# Patient Record
Sex: MALE | Race: WHITE | Employment: OTHER | URBAN - METROPOLITAN AREA
[De-identification: names, ages, dates, MRNs, and addresses within clinical notes are randomized per-mention and may not be internally consistent; named-entity substitution may affect disease eponyms.]

---

## 2018-01-09 PROBLEM — M62.82 RHABDOMYOLYSIS: Status: ACTIVE | Noted: 2018-01-09

## 2018-01-11 PROBLEM — F05 DELIRIUM DUE TO ANOTHER MEDICAL CONDITION: Status: ACTIVE | Noted: 2018-01-11

## 2018-07-03 ENCOUNTER — HOSPITAL ENCOUNTER (INPATIENT)
Age: 68
LOS: 3 days | Discharge: HOME | DRG: 683 | End: 2018-07-06
Attending: GENERAL PRACTICE | Admitting: GENERAL PRACTICE
Payer: MEDICARE

## 2018-07-03 ENCOUNTER — APPOINTMENT (OUTPATIENT)
Dept: ULTRASOUND IMAGING | Age: 68
DRG: 683 | End: 2018-07-03
Attending: GENERAL PRACTICE
Payer: MEDICARE

## 2018-07-03 ENCOUNTER — HOSPITAL ENCOUNTER (EMERGENCY)
Age: 68
Discharge: ANOTHER HOSPITAL | End: 2018-07-03
Attending: EMERGENCY MEDICINE
Payer: MEDICARE

## 2018-07-03 ENCOUNTER — HOSPITAL ENCOUNTER (OUTPATIENT)
Age: 68
Discharge: HOME | End: 2018-07-03
Payer: MEDICARE

## 2018-07-03 VITALS
SYSTOLIC BLOOD PRESSURE: 112 MMHG | HEIGHT: 67 IN | RESPIRATION RATE: 16 BRPM | OXYGEN SATURATION: 95 % | WEIGHT: 160 LBS | DIASTOLIC BLOOD PRESSURE: 68 MMHG | BODY MASS INDEX: 25.11 KG/M2 | TEMPERATURE: 98 F | HEART RATE: 74 BPM

## 2018-07-03 DIAGNOSIS — E86.0 DEHYDRATION: Primary | ICD-10-CM

## 2018-07-03 DIAGNOSIS — N17.9 ACUTE RENAL INJURY (HCC): ICD-10-CM

## 2018-07-03 LAB
ALBUMIN SERPL-MCNC: 4.1 G/DL (ref 3.5–5.2)
ALP BLD-CCNC: 52 U/L (ref 40–129)
ALT SERPL-CCNC: 10 U/L (ref 0–40)
ANION GAP SERPL CALCULATED.3IONS-SCNC: 14 MMOL/L (ref 7–16)
ANION GAP SERPL CALCULATED.3IONS-SCNC: 19 MMOL/L (ref 7–16)
AST SERPL-CCNC: 12 U/L (ref 0–39)
BASOPHILS ABSOLUTE: 0.04 E9/L (ref 0–0.2)
BASOPHILS RELATIVE PERCENT: 0.5 % (ref 0–2)
BILIRUB SERPL-MCNC: 0.4 MG/DL (ref 0–1.2)
BILIRUBIN URINE: NEGATIVE
BLOOD, URINE: NEGATIVE
BUN BLDV-MCNC: 40 MG/DL (ref 8–23)
BUN BLDV-MCNC: 45 MG/DL (ref 8–23)
CALCIUM SERPL-MCNC: 9.3 MG/DL (ref 8.6–10.2)
CALCIUM SERPL-MCNC: 9.8 MG/DL (ref 8.6–10.2)
CHLORIDE BLD-SCNC: 100 MMOL/L (ref 98–107)
CHLORIDE BLD-SCNC: 94 MMOL/L (ref 98–107)
CLARITY: CLEAR
CO2: 22 MMOL/L (ref 22–29)
CO2: 25 MMOL/L (ref 22–29)
COLOR: YELLOW
CREAT SERPL-MCNC: 2.8 MG/DL (ref 0.7–1.2)
CREAT SERPL-MCNC: 4 MG/DL (ref 0.7–1.2)
EOSINOPHILS ABSOLUTE: 0.46 E9/L (ref 0.05–0.5)
EOSINOPHILS RELATIVE PERCENT: 5.9 % (ref 0–6)
GFR AFRICAN AMERICAN: 18
GFR AFRICAN AMERICAN: 28
GFR NON-AFRICAN AMERICAN: 15 ML/MIN/1.73
GFR NON-AFRICAN AMERICAN: 23 ML/MIN/1.73
GLUCOSE BLD-MCNC: 170 MG/DL (ref 74–109)
GLUCOSE BLD-MCNC: 216 MG/DL (ref 74–109)
GLUCOSE URINE: 250 MG/DL
HCT VFR BLD CALC: 40.7 % (ref 37–54)
HEMOGLOBIN: 13.7 G/DL (ref 12.5–16.5)
IMMATURE GRANULOCYTES #: 0.02 E9/L
IMMATURE GRANULOCYTES %: 0.3 % (ref 0–5)
KETONES, URINE: NEGATIVE MG/DL
LACTIC ACID: 0.9 MMOL/L (ref 0.5–2.2)
LEUKOCYTE ESTERASE, URINE: NEGATIVE
LYMPHOCYTES ABSOLUTE: 1.86 E9/L (ref 1.5–4)
LYMPHOCYTES RELATIVE PERCENT: 23.9 % (ref 20–42)
MAGNESIUM: 2.4 MG/DL (ref 1.6–2.6)
MCH RBC QN AUTO: 28.1 PG (ref 26–35)
MCHC RBC AUTO-ENTMCNC: 33.7 % (ref 32–34.5)
MCV RBC AUTO: 83.6 FL (ref 80–99.9)
METER GLUCOSE: 151 MG/DL (ref 70–110)
METER GLUCOSE: 175 MG/DL (ref 70–110)
METER GLUCOSE: 181 MG/DL (ref 70–110)
METER GLUCOSE: 211 MG/DL (ref 70–110)
MONOCYTES ABSOLUTE: 0.81 E9/L (ref 0.1–0.95)
MONOCYTES RELATIVE PERCENT: 10.4 % (ref 2–12)
NEUTROPHILS ABSOLUTE: 4.6 E9/L (ref 1.8–7.3)
NEUTROPHILS RELATIVE PERCENT: 59 % (ref 43–80)
NITRITE, URINE: NEGATIVE
PDW BLD-RTO: 14.8 FL (ref 11.5–15)
PH UA: 5.5 (ref 5–9)
PLATELET # BLD: 258 E9/L (ref 130–450)
PMV BLD AUTO: 9.9 FL (ref 7–12)
POTASSIUM SERPL-SCNC: 4.2 MMOL/L (ref 3.5–5)
POTASSIUM SERPL-SCNC: 4.7 MMOL/L (ref 3.5–5)
PROTEIN UA: NEGATIVE MG/DL
RBC # BLD: 4.87 E12/L (ref 3.8–5.8)
SODIUM BLD-SCNC: 136 MMOL/L (ref 132–146)
SODIUM BLD-SCNC: 138 MMOL/L (ref 132–146)
SPECIFIC GRAVITY UA: 1.01 (ref 1–1.03)
TOTAL PROTEIN: 7.5 G/DL (ref 6.4–8.3)
UROBILINOGEN, URINE: 0.2 E.U./DL
WBC # BLD: 7.8 E9/L (ref 4.5–11.5)

## 2018-07-03 PROCEDURE — 85025 COMPLETE CBC W/AUTO DIFF WBC: CPT

## 2018-07-03 PROCEDURE — 96361 HYDRATE IV INFUSION ADD-ON: CPT

## 2018-07-03 PROCEDURE — 82962 GLUCOSE BLOOD TEST: CPT

## 2018-07-03 PROCEDURE — A0425 GROUND MILEAGE: HCPCS

## 2018-07-03 PROCEDURE — 36415 COLL VENOUS BLD VENIPUNCTURE: CPT

## 2018-07-03 PROCEDURE — 80053 COMPREHEN METABOLIC PANEL: CPT

## 2018-07-03 PROCEDURE — 1200000000 HC SEMI PRIVATE

## 2018-07-03 PROCEDURE — 80048 BASIC METABOLIC PNL TOTAL CA: CPT

## 2018-07-03 PROCEDURE — 81003 URINALYSIS AUTO W/O SCOPE: CPT

## 2018-07-03 PROCEDURE — 2580000003 HC RX 258: Performed by: EMERGENCY MEDICINE

## 2018-07-03 PROCEDURE — A0428 BLS: HCPCS

## 2018-07-03 PROCEDURE — 76770 US EXAM ABDO BACK WALL COMP: CPT

## 2018-07-03 PROCEDURE — 96360 HYDRATION IV INFUSION INIT: CPT

## 2018-07-03 PROCEDURE — 83735 ASSAY OF MAGNESIUM: CPT

## 2018-07-03 PROCEDURE — 2580000003 HC RX 258: Performed by: GENERAL PRACTICE

## 2018-07-03 PROCEDURE — 6360000002 HC RX W HCPCS: Performed by: GENERAL PRACTICE

## 2018-07-03 PROCEDURE — 6370000000 HC RX 637 (ALT 250 FOR IP): Performed by: GENERAL PRACTICE

## 2018-07-03 PROCEDURE — 99285 EMERGENCY DEPT VISIT HI MDM: CPT

## 2018-07-03 PROCEDURE — 83605 ASSAY OF LACTIC ACID: CPT

## 2018-07-03 RX ORDER — 0.9 % SODIUM CHLORIDE 0.9 %
1000 INTRAVENOUS SOLUTION INTRAVENOUS ONCE
Status: COMPLETED | OUTPATIENT
Start: 2018-07-03 | End: 2018-07-03

## 2018-07-03 RX ORDER — HYDROXYZINE PAMOATE 25 MG/1
25 CAPSULE ORAL NIGHTLY
Status: DISCONTINUED | OUTPATIENT
Start: 2018-07-03 | End: 2018-07-06 | Stop reason: HOSPADM

## 2018-07-03 RX ORDER — SODIUM CHLORIDE 450 MG/100ML
INJECTION, SOLUTION INTRAVENOUS CONTINUOUS
Status: DISCONTINUED | OUTPATIENT
Start: 2018-07-03 | End: 2018-07-06 | Stop reason: HOSPADM

## 2018-07-03 RX ORDER — FENOFIBRATE 160 MG/1
160 TABLET ORAL DAILY
Status: DISCONTINUED | OUTPATIENT
Start: 2018-07-03 | End: 2018-07-06 | Stop reason: HOSPADM

## 2018-07-03 RX ORDER — NICOTINE POLACRILEX 4 MG
15 LOZENGE BUCCAL PRN
Status: DISCONTINUED | OUTPATIENT
Start: 2018-07-03 | End: 2018-07-06 | Stop reason: HOSPADM

## 2018-07-03 RX ORDER — AMOXICILLIN 500 MG/1
500 CAPSULE ORAL 3 TIMES DAILY
COMMUNITY
End: 2019-09-07

## 2018-07-03 RX ORDER — ROSUVASTATIN CALCIUM 10 MG/1
40 TABLET, COATED ORAL EVERY EVENING
Status: DISCONTINUED | OUTPATIENT
Start: 2018-07-03 | End: 2018-07-06 | Stop reason: HOSPADM

## 2018-07-03 RX ORDER — FAMOTIDINE 20 MG/1
20 TABLET, FILM COATED ORAL 2 TIMES DAILY
Status: DISCONTINUED | OUTPATIENT
Start: 2018-07-03 | End: 2018-07-06 | Stop reason: HOSPADM

## 2018-07-03 RX ORDER — CHOLECALCIFEROL (VITAMIN D3) 50 MCG
2000 TABLET ORAL DAILY
Status: DISCONTINUED | OUTPATIENT
Start: 2018-07-03 | End: 2018-07-06 | Stop reason: HOSPADM

## 2018-07-03 RX ORDER — DEXTROSE MONOHYDRATE 50 MG/ML
100 INJECTION, SOLUTION INTRAVENOUS PRN
Status: DISCONTINUED | OUTPATIENT
Start: 2018-07-03 | End: 2018-07-06 | Stop reason: HOSPADM

## 2018-07-03 RX ORDER — VILAZODONE HYDROCHLORIDE 10 MG/1
20 TABLET ORAL DAILY
Status: DISCONTINUED | OUTPATIENT
Start: 2018-07-03 | End: 2018-07-06 | Stop reason: HOSPADM

## 2018-07-03 RX ORDER — AMOXICILLIN 250 MG/1
500 CAPSULE ORAL 3 TIMES DAILY
Status: DISCONTINUED | OUTPATIENT
Start: 2018-07-03 | End: 2018-07-06 | Stop reason: HOSPADM

## 2018-07-03 RX ORDER — ACETAMINOPHEN 500 MG
500 TABLET ORAL EVERY 4 HOURS PRN
Status: DISCONTINUED | OUTPATIENT
Start: 2018-07-03 | End: 2018-07-06 | Stop reason: HOSPADM

## 2018-07-03 RX ORDER — DEXTROSE MONOHYDRATE 25 G/50ML
12.5 INJECTION, SOLUTION INTRAVENOUS PRN
Status: DISCONTINUED | OUTPATIENT
Start: 2018-07-03 | End: 2018-07-06 | Stop reason: HOSPADM

## 2018-07-03 RX ORDER — GLIPIZIDE 5 MG/1
10 TABLET ORAL
Status: DISCONTINUED | OUTPATIENT
Start: 2018-07-03 | End: 2018-07-06 | Stop reason: HOSPADM

## 2018-07-03 RX ORDER — HYDROXYZINE HYDROCHLORIDE 25 MG/1
25 TABLET, FILM COATED ORAL NIGHTLY
COMMUNITY

## 2018-07-03 RX ORDER — CLONIDINE HYDROCHLORIDE 0.1 MG/1
0.1 TABLET ORAL 2 TIMES DAILY
Status: DISCONTINUED | OUTPATIENT
Start: 2018-07-03 | End: 2018-07-06

## 2018-07-03 RX ORDER — HYDROXYZINE HYDROCHLORIDE 10 MG/1
25 TABLET, FILM COATED ORAL NIGHTLY
Status: DISCONTINUED | OUTPATIENT
Start: 2018-07-03 | End: 2018-07-03 | Stop reason: CLARIF

## 2018-07-03 RX ORDER — VILAZODONE HYDROCHLORIDE 20 MG/1
20 TABLET ORAL DAILY
COMMUNITY
End: 2019-09-07

## 2018-07-03 RX ORDER — CHOLECALCIFEROL (VITAMIN D3) 125 MCG
2000 TABLET ORAL
COMMUNITY

## 2018-07-03 RX ADMIN — ROSUVASTATIN CALCIUM 40 MG: 10 TABLET, FILM COATED ORAL at 20:07

## 2018-07-03 RX ADMIN — GLIPIZIDE 10 MG: 5 TABLET ORAL at 20:06

## 2018-07-03 RX ADMIN — Medication 400 MG: at 21:00

## 2018-07-03 RX ADMIN — CLONIDINE HYDROCHLORIDE 0.1 MG: 0.1 TABLET ORAL at 21:01

## 2018-07-03 RX ADMIN — AMOXICILLIN 500 MG: 250 CAPSULE ORAL at 21:01

## 2018-07-03 RX ADMIN — SODIUM CHLORIDE 1000 ML: 9 INJECTION, SOLUTION INTRAVENOUS at 11:22

## 2018-07-03 RX ADMIN — SODIUM CHLORIDE: 4.5 INJECTION, SOLUTION INTRAVENOUS at 18:14

## 2018-07-03 RX ADMIN — ACETAMINOPHEN 500 MG: 500 TABLET ORAL at 21:00

## 2018-07-03 RX ADMIN — INSULIN LISPRO 1 UNITS: 100 INJECTION, SOLUTION INTRAVENOUS; SUBCUTANEOUS at 21:13

## 2018-07-03 RX ADMIN — Medication 2000 UNITS: at 20:06

## 2018-07-03 RX ADMIN — FAMOTIDINE 20 MG: 20 TABLET ORAL at 21:00

## 2018-07-03 RX ADMIN — ENOXAPARIN SODIUM 30 MG: 100 INJECTION SUBCUTANEOUS at 20:07

## 2018-07-03 RX ADMIN — HYDROXYZINE PAMOATE 25 MG: 25 CAPSULE ORAL at 21:02

## 2018-07-03 ASSESSMENT — PAIN DESCRIPTION - ONSET: ONSET: ON-GOING

## 2018-07-03 ASSESSMENT — PAIN SCALES - GENERAL
PAINLEVEL_OUTOF10: 5
PAINLEVEL_OUTOF10: 0
PAINLEVEL_OUTOF10: 0

## 2018-07-03 ASSESSMENT — PAIN DESCRIPTION - LOCATION: LOCATION: HEAD

## 2018-07-03 ASSESSMENT — PAIN DESCRIPTION - ORIENTATION: ORIENTATION: MID

## 2018-07-03 ASSESSMENT — PAIN DESCRIPTION - PAIN TYPE: TYPE: ACUTE PAIN

## 2018-07-03 ASSESSMENT — PAIN DESCRIPTION - FREQUENCY: FREQUENCY: INTERMITTENT

## 2018-07-03 ASSESSMENT — PAIN DESCRIPTION - DESCRIPTORS: DESCRIPTORS: HEADACHE

## 2018-07-03 ASSESSMENT — PAIN DESCRIPTION - PROGRESSION: CLINICAL_PROGRESSION: NOT CHANGED

## 2018-07-03 NOTE — PROGRESS NOTES
Spoke with Dr. Rashmi Pearl regarding nephrology consult. See new orders.   Electronically signed by Sandhya Thakur RN on 7/3/2018 at 6:12 PM

## 2018-07-04 LAB
ALBUMIN SERPL-MCNC: 3.5 G/DL (ref 3.5–5.2)
ALP BLD-CCNC: 52 U/L (ref 40–129)
ALT SERPL-CCNC: 8 U/L (ref 0–40)
ANION GAP SERPL CALCULATED.3IONS-SCNC: 13 MMOL/L (ref 7–16)
AST SERPL-CCNC: 12 U/L (ref 0–39)
BILIRUB SERPL-MCNC: 0.2 MG/DL (ref 0–1.2)
BUN BLDV-MCNC: 35 MG/DL (ref 8–23)
CALCIUM SERPL-MCNC: 9 MG/DL (ref 8.6–10.2)
CHLORIDE BLD-SCNC: 102 MMOL/L (ref 98–107)
CO2: 22 MMOL/L (ref 22–29)
CREAT SERPL-MCNC: 2.3 MG/DL (ref 0.7–1.2)
GFR AFRICAN AMERICAN: 35
GFR NON-AFRICAN AMERICAN: 29 ML/MIN/1.73
GLUCOSE BLD-MCNC: 189 MG/DL (ref 74–109)
HCT VFR BLD CALC: 37.3 % (ref 37–54)
HEMOGLOBIN: 12.4 G/DL (ref 12.5–16.5)
MCH RBC QN AUTO: 28.4 PG (ref 26–35)
MCHC RBC AUTO-ENTMCNC: 33.2 % (ref 32–34.5)
MCV RBC AUTO: 85.4 FL (ref 80–99.9)
METER GLUCOSE: 122 MG/DL (ref 70–110)
METER GLUCOSE: 133 MG/DL (ref 70–110)
METER GLUCOSE: 144 MG/DL (ref 70–110)
METER GLUCOSE: 171 MG/DL (ref 70–110)
METER GLUCOSE: 175 MG/DL (ref 70–110)
PDW BLD-RTO: 14.7 FL (ref 11.5–15)
PLATELET # BLD: 237 E9/L (ref 130–450)
PMV BLD AUTO: 10.3 FL (ref 7–12)
POTASSIUM SERPL-SCNC: 4.4 MMOL/L (ref 3.5–5)
PROSTATE SPECIFIC ANTIGEN: 0.56 NG/ML (ref 0–4)
RBC # BLD: 4.37 E12/L (ref 3.8–5.8)
SODIUM BLD-SCNC: 137 MMOL/L (ref 132–146)
TOTAL PROTEIN: 6.4 G/DL (ref 6.4–8.3)
WBC # BLD: 6.6 E9/L (ref 4.5–11.5)

## 2018-07-04 PROCEDURE — 1200000000 HC SEMI PRIVATE

## 2018-07-04 PROCEDURE — 85027 COMPLETE CBC AUTOMATED: CPT

## 2018-07-04 PROCEDURE — G0103 PSA SCREENING: HCPCS

## 2018-07-04 PROCEDURE — 2580000003 HC RX 258: Performed by: GENERAL PRACTICE

## 2018-07-04 PROCEDURE — 36415 COLL VENOUS BLD VENIPUNCTURE: CPT

## 2018-07-04 PROCEDURE — 6360000002 HC RX W HCPCS: Performed by: GENERAL PRACTICE

## 2018-07-04 PROCEDURE — 82962 GLUCOSE BLOOD TEST: CPT

## 2018-07-04 PROCEDURE — 6370000000 HC RX 637 (ALT 250 FOR IP): Performed by: GENERAL PRACTICE

## 2018-07-04 PROCEDURE — 80053 COMPREHEN METABOLIC PANEL: CPT

## 2018-07-04 RX ADMIN — INSULIN LISPRO 1 UNITS: 100 INJECTION, SOLUTION INTRAVENOUS; SUBCUTANEOUS at 12:22

## 2018-07-04 RX ADMIN — AMOXICILLIN 500 MG: 250 CAPSULE ORAL at 22:04

## 2018-07-04 RX ADMIN — FAMOTIDINE 20 MG: 20 TABLET ORAL at 08:28

## 2018-07-04 RX ADMIN — CLONIDINE HYDROCHLORIDE 0.1 MG: 0.1 TABLET ORAL at 22:04

## 2018-07-04 RX ADMIN — ENOXAPARIN SODIUM 30 MG: 100 INJECTION SUBCUTANEOUS at 08:33

## 2018-07-04 RX ADMIN — SODIUM CHLORIDE: 4.5 INJECTION, SOLUTION INTRAVENOUS at 13:01

## 2018-07-04 RX ADMIN — ROSUVASTATIN CALCIUM 40 MG: 10 TABLET, FILM COATED ORAL at 16:56

## 2018-07-04 RX ADMIN — CLONIDINE HYDROCHLORIDE 0.1 MG: 0.1 TABLET ORAL at 08:28

## 2018-07-04 RX ADMIN — HYDROXYZINE PAMOATE 25 MG: 25 CAPSULE ORAL at 22:05

## 2018-07-04 RX ADMIN — INSULIN LISPRO 1 UNITS: 100 INJECTION, SOLUTION INTRAVENOUS; SUBCUTANEOUS at 16:53

## 2018-07-04 RX ADMIN — INSULIN LISPRO 1 UNITS: 100 INJECTION, SOLUTION INTRAVENOUS; SUBCUTANEOUS at 08:33

## 2018-07-04 RX ADMIN — VILAZODONE HYDROCHLORIDE 20 MG: 10 TABLET ORAL at 08:28

## 2018-07-04 RX ADMIN — GLIPIZIDE 10 MG: 5 TABLET ORAL at 06:35

## 2018-07-04 RX ADMIN — Medication 400 MG: at 08:33

## 2018-07-04 RX ADMIN — Medication 400 MG: at 22:04

## 2018-07-04 RX ADMIN — AMOXICILLIN 500 MG: 250 CAPSULE ORAL at 14:10

## 2018-07-04 RX ADMIN — Medication 2000 UNITS: at 08:28

## 2018-07-04 RX ADMIN — FAMOTIDINE 20 MG: 20 TABLET ORAL at 22:04

## 2018-07-04 RX ADMIN — FENOFIBRATE 160 MG: 160 TABLET ORAL at 08:28

## 2018-07-04 RX ADMIN — ACETAMINOPHEN 500 MG: 500 TABLET ORAL at 19:29

## 2018-07-04 RX ADMIN — GLIPIZIDE 10 MG: 5 TABLET ORAL at 16:56

## 2018-07-04 RX ADMIN — AMOXICILLIN 500 MG: 250 CAPSULE ORAL at 08:28

## 2018-07-04 ASSESSMENT — PAIN DESCRIPTION - LOCATION
LOCATION: GENERALIZED
LOCATION: GENERALIZED

## 2018-07-04 ASSESSMENT — PAIN DESCRIPTION - PROGRESSION
CLINICAL_PROGRESSION: GRADUALLY WORSENING
CLINICAL_PROGRESSION: RAPIDLY IMPROVING

## 2018-07-04 ASSESSMENT — PAIN DESCRIPTION - DESCRIPTORS
DESCRIPTORS: DISCOMFORT
DESCRIPTORS: ACHING;DISCOMFORT

## 2018-07-04 ASSESSMENT — PAIN SCALES - GENERAL
PAINLEVEL_OUTOF10: 3
PAINLEVEL_OUTOF10: 6

## 2018-07-04 ASSESSMENT — PAIN DESCRIPTION - FREQUENCY
FREQUENCY: INTERMITTENT
FREQUENCY: INTERMITTENT

## 2018-07-04 ASSESSMENT — PAIN DESCRIPTION - ONSET
ONSET: GRADUAL
ONSET: GRADUAL

## 2018-07-04 ASSESSMENT — PAIN DESCRIPTION - PAIN TYPE
TYPE: ACUTE PAIN
TYPE: ACUTE PAIN

## 2018-07-04 NOTE — CONSULTS
wheezes, no rhonchi. Equal chest excursion with respirations. Heart RRR. pmi not laterally displaced. No S3 or S4, no rub  Abdomen:  Soft, ND, NT. Bowel sounds present. No HSM. No epigastric bruit, no increased Ao pulsation,  Extremities: warm to touch. No LE edema or cyanosis. No fem bruit. 2+ upstrokes and equal bilaterally. PT/Pedal 2+ equal bilat  Neuro: Cr N 2-12 grossly intact. No focal motor neuro deficit. No alteration in recent remote memory.     Assessment/Plan                                     Acute renal failure in the setting of poor po intake , NSAID on board                                    And recent tooth extraction , normal renal sono , patient responding to hydration                                         Recent tooth extraction, was on percocet and motrin - leading to confusion                                       H/O DM , HTN currently fairly controlled                                         Patient responding well to hydration , will check urine microalbumin in view of dm                                       Continue present care                  Thank you for allowing us to participate [facility]  7/4/2018  5:20 PM

## 2018-07-05 LAB
ALBUMIN SERPL-MCNC: 3.5 G/DL (ref 3.5–5.2)
ALP BLD-CCNC: 50 U/L (ref 40–129)
ALT SERPL-CCNC: 8 U/L (ref 0–40)
ANION GAP SERPL CALCULATED.3IONS-SCNC: 13 MMOL/L (ref 7–16)
AST SERPL-CCNC: 14 U/L (ref 0–39)
BILIRUB SERPL-MCNC: 0.3 MG/DL (ref 0–1.2)
BUN BLDV-MCNC: 22 MG/DL (ref 8–23)
CALCIUM SERPL-MCNC: 8.9 MG/DL (ref 8.6–10.2)
CHLORIDE BLD-SCNC: 103 MMOL/L (ref 98–107)
CO2: 23 MMOL/L (ref 22–29)
CREAT SERPL-MCNC: 1.5 MG/DL (ref 0.7–1.2)
GFR AFRICAN AMERICAN: 57
GFR NON-AFRICAN AMERICAN: 47 ML/MIN/1.73
GLUCOSE BLD-MCNC: 112 MG/DL (ref 74–109)
HCT VFR BLD CALC: 38.3 % (ref 37–54)
HEMOGLOBIN: 12.7 G/DL (ref 12.5–16.5)
MCH RBC QN AUTO: 28.2 PG (ref 26–35)
MCHC RBC AUTO-ENTMCNC: 33.2 % (ref 32–34.5)
MCV RBC AUTO: 84.9 FL (ref 80–99.9)
METER GLUCOSE: 118 MG/DL (ref 70–110)
METER GLUCOSE: 143 MG/DL (ref 70–110)
METER GLUCOSE: 82 MG/DL (ref 70–110)
METER GLUCOSE: 99 MG/DL (ref 70–110)
PDW BLD-RTO: 14.6 FL (ref 11.5–15)
PLATELET # BLD: 241 E9/L (ref 130–450)
PMV BLD AUTO: 9.8 FL (ref 7–12)
POTASSIUM SERPL-SCNC: 4.4 MMOL/L (ref 3.5–5)
RBC # BLD: 4.51 E12/L (ref 3.8–5.8)
SODIUM BLD-SCNC: 139 MMOL/L (ref 132–146)
TOTAL PROTEIN: 6.5 G/DL (ref 6.4–8.3)
WBC # BLD: 5.2 E9/L (ref 4.5–11.5)

## 2018-07-05 PROCEDURE — 80053 COMPREHEN METABOLIC PANEL: CPT

## 2018-07-05 PROCEDURE — 6370000000 HC RX 637 (ALT 250 FOR IP): Performed by: GENERAL PRACTICE

## 2018-07-05 PROCEDURE — 6360000002 HC RX W HCPCS: Performed by: GENERAL PRACTICE

## 2018-07-05 PROCEDURE — 82962 GLUCOSE BLOOD TEST: CPT

## 2018-07-05 PROCEDURE — 2580000003 HC RX 258: Performed by: GENERAL PRACTICE

## 2018-07-05 PROCEDURE — 1200000000 HC SEMI PRIVATE

## 2018-07-05 PROCEDURE — 85027 COMPLETE CBC AUTOMATED: CPT

## 2018-07-05 PROCEDURE — 36415 COLL VENOUS BLD VENIPUNCTURE: CPT

## 2018-07-05 RX ADMIN — Medication 2000 UNITS: at 08:26

## 2018-07-05 RX ADMIN — FAMOTIDINE 20 MG: 20 TABLET ORAL at 08:26

## 2018-07-05 RX ADMIN — GLIPIZIDE 10 MG: 5 TABLET ORAL at 16:32

## 2018-07-05 RX ADMIN — ENOXAPARIN SODIUM 30 MG: 100 INJECTION SUBCUTANEOUS at 08:26

## 2018-07-05 RX ADMIN — AMOXICILLIN 500 MG: 250 CAPSULE ORAL at 08:26

## 2018-07-05 RX ADMIN — INSULIN LISPRO 1 UNITS: 100 INJECTION, SOLUTION INTRAVENOUS; SUBCUTANEOUS at 20:04

## 2018-07-05 RX ADMIN — SODIUM CHLORIDE: 4.5 INJECTION, SOLUTION INTRAVENOUS at 09:04

## 2018-07-05 RX ADMIN — FENOFIBRATE 160 MG: 160 TABLET ORAL at 08:26

## 2018-07-05 RX ADMIN — AMOXICILLIN 500 MG: 250 CAPSULE ORAL at 20:01

## 2018-07-05 RX ADMIN — FAMOTIDINE 20 MG: 20 TABLET ORAL at 20:02

## 2018-07-05 RX ADMIN — Medication 400 MG: at 20:01

## 2018-07-05 RX ADMIN — AMOXICILLIN 500 MG: 250 CAPSULE ORAL at 13:58

## 2018-07-05 RX ADMIN — SODIUM CHLORIDE: 4.5 INJECTION, SOLUTION INTRAVENOUS at 18:58

## 2018-07-05 RX ADMIN — HYDROXYZINE PAMOATE 25 MG: 25 CAPSULE ORAL at 20:04

## 2018-07-05 RX ADMIN — GLIPIZIDE 10 MG: 5 TABLET ORAL at 06:58

## 2018-07-05 RX ADMIN — CLONIDINE HYDROCHLORIDE 0.1 MG: 0.1 TABLET ORAL at 08:26

## 2018-07-05 RX ADMIN — Medication 400 MG: at 08:26

## 2018-07-05 RX ADMIN — ROSUVASTATIN CALCIUM 40 MG: 10 TABLET, FILM COATED ORAL at 17:50

## 2018-07-05 RX ADMIN — VILAZODONE HYDROCHLORIDE 20 MG: 10 TABLET ORAL at 08:26

## 2018-07-05 RX ADMIN — CLONIDINE HYDROCHLORIDE 0.1 MG: 0.1 TABLET ORAL at 20:03

## 2018-07-05 ASSESSMENT — PAIN SCALES - GENERAL
PAINLEVEL_OUTOF10: 0

## 2018-07-05 ASSESSMENT — PAIN DESCRIPTION - PROGRESSION: CLINICAL_PROGRESSION: RAPIDLY IMPROVING

## 2018-07-05 NOTE — PROGRESS NOTES
[facility]  Nephrology Attending Progress Note  Natalie Saeed.  Marie Navarro MD        SUBJECTIVE:     7/5: eating better, no cp or sob      PROBLEM LIST:    Patient Active Problem List   Diagnosis    Rhabdomyolysis    Delirium due to another medical condition    Dehydration        PAST MEDICAL HISTORY:    Past Medical History:   Diagnosis Date    Dementia     Depression     Diabetes mellitus ([facility]     Hypertension     Neuropathy ([facility]        DIET:    DIET RENAL;     PHYSICAL EXAM:     Patient Vitals for the past 24 hrs:   BP Temp Temp src Pulse Resp SpO2 Weight   07/05/18 0930 (!) 143/90 - - 76 - - -   07/05/18 0808 (!) 156/100 98.5 °F (36.9 °C) Oral 89 20 95 % -   07/05/18 0322 - - - - - - 165 lb 3.2 oz (74.9 kg)   07/04/18 2200 (!) 158/75 98.6 °F (37 °C) Oral 73 18 98 % -   @      Intake/Output Summary (Last 24 hours) at 07/05/18 1036  Last data filed at 07/05/18 0542   Gross per 24 hour   Intake             1539 ml   Output              500 ml   Net             1039 ml         Wt Readings from Last 3 Encounters:   07/05/18 165 lb 3.2 oz (74.9 kg)   07/03/18 160 lb (72.6 kg)   01/16/18 160 lb 3.2 oz (72.7 kg)       Constitutional:  Pt is in no acute distress  Head: normocephalic, atraumatic  Neck: no JVD  Cardiovascular: regular rate and rhythm, no murmurs, gallops, or rubs  Respiratory:  No rales, rhochi, or wheezes  Gastrointestinal:  Soft, nontender, nondistended, bowel sounds x 4  Ext: no edema  Skin: dry, no rash  Neuro: aaox3    MEDS (scheduled):    amoxicillin  500 mg Oral TID    cloNIDine  0.1 mg Oral BID    vitamin D  2,000 Units Oral Daily    famotidine  20 mg Oral BID    fenofibrate  160 mg Oral Daily    glipiZIDE  10 mg Oral BID AC    magnesium oxide  400 mg Oral BID    rosuvastatin  40 mg Oral QPM    vilazodone HCl  20 mg Oral Daily    insulin lispro  0-6 Units Subcutaneous TID WC    insulin lispro  0-3 Units Subcutaneous Nightly    enoxaparin  30 mg Subcutaneous Daily    hydrOXYzine 25 mg Oral Nightly       MEDS (infusions):   sodium chloride 100 mL/hr at 07/05/18 0904    dextrose         MEDS (prn):  glucose, dextrose, glucagon (rDNA), dextrose, acetaminophen    DATA:    Recent Labs      07/03/18   1044  07/04/18   0420 07/05/18   0340   WBC  7.8  6.6  5.2   HGB  13.7  12.4*  12.7   HCT  40.7  37.3  38.3   MCV  83.6  85.4  84.9   PLT  258  237  241     Recent Labs      07/03/18   1044  07/03/18   1119  07/03/18 2005 07/04/18   0420 07/05/18   0340   NA  138   --   136  137  139   K  4.7   --   4.2  4.4  4.4   CL  94*   --   100  102  103   CO2  25   --   22  22  23   BUN  45*   --   40*  35*  22   CREATININE  4.0*   --   2.8*  2.3*  1.5*   LABGLOM  15   --   23  29  47   GLUCOSE  216*   --   170*  189*  112*   CALCIUM  9.8   --   9.3  9.0  8.9   ALT  10   --    --   8  8   AST  12   --    --   12  14   BILITOT  0.4   --    --   0.2  0.3   ALKPHOS  52   --    --   52  50   MG   --   2.4   --    --    --        Lab Results   Component Value Date    LABALBU 3.5 07/05/2018    LABALBU 3.5 07/04/2018    LABALBU 4.1 07/03/2018     Lab Results   Component Value Date    TSH 0.686 01/10/2018     No results found for: PHART, PO2ART, AYT9XHM    Iron Studies: No results found for: IRON, TIBC, FERRITIN      IMPRESSION/RECOMMENDATIONS:      1. Acute renal failure in the setting of poor po intake , NSAID on board   And recent tooth extraction , normal renal sono , patient responding to hydration , cr at 1.5      2. Recent tooth extraction, was on percocet and motrin - leading to confusion       3. Htn: stable on clonidine                                          Oziel Awan.  Severino Soriano MD

## 2018-07-05 NOTE — PLAN OF CARE
Problem: Falls - Risk of:  Goal: Will remain free from falls  Will remain free from falls   Outcome: Met This Shift      Problem: Fluid Volume - Imbalance:  Goal: Absence of imbalanced fluid volume signs and symptoms  Absence of imbalanced fluid volume signs and symptoms   Outcome: Met This Shift      Problem: Pain:  Goal: Pain level will decrease  Pain level will decrease   Outcome: Met This Shift

## 2018-07-06 VITALS
BODY MASS INDEX: 25.51 KG/M2 | HEIGHT: 67 IN | WEIGHT: 162.5 LBS | RESPIRATION RATE: 16 BRPM | OXYGEN SATURATION: 96 % | TEMPERATURE: 98.2 F | HEART RATE: 81 BPM | DIASTOLIC BLOOD PRESSURE: 95 MMHG | SYSTOLIC BLOOD PRESSURE: 160 MMHG

## 2018-07-06 LAB
ALBUMIN SERPL-MCNC: 3.7 G/DL (ref 3.5–5.2)
ALP BLD-CCNC: 45 U/L (ref 40–129)
ALT SERPL-CCNC: 8 U/L (ref 0–40)
ANION GAP SERPL CALCULATED.3IONS-SCNC: 14 MMOL/L (ref 7–16)
AST SERPL-CCNC: 16 U/L (ref 0–39)
BILIRUB SERPL-MCNC: 0.3 MG/DL (ref 0–1.2)
BUN BLDV-MCNC: 15 MG/DL (ref 8–23)
CALCIUM SERPL-MCNC: 9 MG/DL (ref 8.6–10.2)
CHLORIDE BLD-SCNC: 103 MMOL/L (ref 98–107)
CO2: 22 MMOL/L (ref 22–29)
CREAT SERPL-MCNC: 1.2 MG/DL (ref 0.7–1.2)
GFR AFRICAN AMERICAN: >60
GFR NON-AFRICAN AMERICAN: >60 ML/MIN/1.73
GLUCOSE BLD-MCNC: 84 MG/DL (ref 74–109)
HCT VFR BLD CALC: 38.5 % (ref 37–54)
HEMOGLOBIN: 12.9 G/DL (ref 12.5–16.5)
MCH RBC QN AUTO: 28 PG (ref 26–35)
MCHC RBC AUTO-ENTMCNC: 33.5 % (ref 32–34.5)
MCV RBC AUTO: 83.5 FL (ref 80–99.9)
METER GLUCOSE: 81 MG/DL (ref 70–110)
PDW BLD-RTO: 14.6 FL (ref 11.5–15)
PLATELET # BLD: 263 E9/L (ref 130–450)
PMV BLD AUTO: 9.4 FL (ref 7–12)
POTASSIUM SERPL-SCNC: 3.9 MMOL/L (ref 3.5–5)
RBC # BLD: 4.61 E12/L (ref 3.8–5.8)
SODIUM BLD-SCNC: 139 MMOL/L (ref 132–146)
TOTAL PROTEIN: 6.6 G/DL (ref 6.4–8.3)
WBC # BLD: 6.1 E9/L (ref 4.5–11.5)

## 2018-07-06 PROCEDURE — 36415 COLL VENOUS BLD VENIPUNCTURE: CPT

## 2018-07-06 PROCEDURE — 2580000003 HC RX 258: Performed by: GENERAL PRACTICE

## 2018-07-06 PROCEDURE — 85027 COMPLETE CBC AUTOMATED: CPT

## 2018-07-06 PROCEDURE — 80053 COMPREHEN METABOLIC PANEL: CPT

## 2018-07-06 PROCEDURE — 6360000002 HC RX W HCPCS: Performed by: GENERAL PRACTICE

## 2018-07-06 PROCEDURE — 82962 GLUCOSE BLOOD TEST: CPT

## 2018-07-06 PROCEDURE — 6370000000 HC RX 637 (ALT 250 FOR IP): Performed by: GENERAL PRACTICE

## 2018-07-06 RX ORDER — CLONIDINE HYDROCHLORIDE 0.2 MG/1
0.2 TABLET ORAL 2 TIMES DAILY
Qty: 60 TABLET | Refills: 3 | Status: SHIPPED | OUTPATIENT
Start: 2018-07-06

## 2018-07-06 RX ORDER — CLONIDINE HYDROCHLORIDE 0.2 MG/1
0.2 TABLET ORAL 2 TIMES DAILY
Status: DISCONTINUED | OUTPATIENT
Start: 2018-07-06 | End: 2018-07-06 | Stop reason: HOSPADM

## 2018-07-06 RX ADMIN — AMOXICILLIN 500 MG: 250 CAPSULE ORAL at 08:53

## 2018-07-06 RX ADMIN — Medication 400 MG: at 08:53

## 2018-07-06 RX ADMIN — SODIUM CHLORIDE: 4.5 INJECTION, SOLUTION INTRAVENOUS at 04:32

## 2018-07-06 RX ADMIN — FENOFIBRATE 160 MG: 160 TABLET ORAL at 08:53

## 2018-07-06 RX ADMIN — FAMOTIDINE 20 MG: 20 TABLET ORAL at 08:53

## 2018-07-06 RX ADMIN — VILAZODONE HYDROCHLORIDE 20 MG: 10 TABLET ORAL at 10:07

## 2018-07-06 RX ADMIN — Medication 2000 UNITS: at 08:53

## 2018-07-06 RX ADMIN — ENOXAPARIN SODIUM 30 MG: 100 INJECTION SUBCUTANEOUS at 08:52

## 2018-07-06 RX ADMIN — GLIPIZIDE 10 MG: 5 TABLET ORAL at 06:31

## 2018-07-06 RX ADMIN — CLONIDINE HYDROCHLORIDE 0.1 MG: 0.1 TABLET ORAL at 08:53

## 2018-07-06 ASSESSMENT — PAIN SCALES - GENERAL: PAINLEVEL_OUTOF10: 0

## 2018-08-02 PROBLEM — E86.0 DEHYDRATION: Status: RESOLVED | Noted: 2018-07-03 | Resolved: 2018-08-02

## 2018-08-02 NOTE — DISCHARGE SUMMARY
well hydrated and we will follow up in the  office in about a week or so for routine evaluation. At the time of  discharge, his condition was improved. His prognosis is good.       Markie Romo DO    D: 08/01/2018 15:44:44       T: 08/01/2018 15:59:09     [email or web address]  Job#: [id number]     Doc#: [id number]    CC:

## 2018-08-02 NOTE — H&P
[address] [facility] [address]                               HISTORY AND PHYSICAL    PATIENT NAME: Phoenix Farfan                   :        1950  MED REC NO:   [id number]                            ROOM:  ACCOUNT NO:   [de-identified]                           ADMIT DATE: 2018  PROVIDER:     Memory Sandifer, DO    HISTORY OF PRESENT ILLNESS:  This is a 73-year-old white male, who  presented to the Emergency Department after he was brought there for  increasing confusion and disorientation beginning couple of days ago. This  patient has had several teeth extracted several days ago and he was  prescribed Percocet for pain and a soft diet. He states that he has not  been able to eat because the pain in his mouth, food just does not taste  right, and it is too hard to swallow, so he has been taking Percocet and  just not eating or drinking very much. He has had no fevers, sweats, or  chills. No cough or shortness of breath. He has had a similar problem  related to electrolyte imbalance in the past.  In the Emergency Department,  he was noted to have a BUN elevated at 45 and creatinine elevated at 40. Significantly up from his baseline. Chloride was 92. White count was  normal.  Hemoglobin and hematocrit were normal.  Lactic acid was normal at  0.9. Vitals were good. Blood pressure 100/64, pulse 74, and temperature  98.4. Height 5 feet 7 inches and weight 160. BMI 25.06.  O2 saturation  was normal.  Blood glucose was slightly elevated. The patient is a known  diabetic. So, we are going to go ahead and bring him in with mental status  changes, dehydration, and acute renal injury secondary to poor oral intake  and _____ use of narcotic medications.     MEDICATIONS:  Recent and current medications included TriCor, Crestor,  Glucotrol, Pepcid, mag oxide, Atarax, Viibryd, Vitamin D2, penicillin, and  Catapres 0. 2. PAST MEDICAL HISTORY:  Significant for hypertension, diabetes mellitus,  delirium, chronic kidney and acute kidney disease, hypertension,  hyperlipidemia, and diabetes mellitus. PAST SURGICAL HISTORY:  He has had a back surgery related to disk disease. ALLERGIES:  He states that he has no known allergies. SOCIAL HISTORY:  He is an everyday smoker. Nondrinker. Denies the use of  narcotic drugs for recreational purposes. FAMILY MEDICAL HISTORY:  Was noted and discussed. REVIEW OF SYSTEMS:  Shows; he denies headache. There is some  lightheadedness and vertigo. Some hearing loss. No bloody noses. No  difficulty swallowing. No hoarseness in his voice. No visual  disturbances. He denies chest pain, palpitations, orthopnea, paroxysmal  nocturnal dyspnea, or edema. He denies cough, wheeze, shortness of breath,  hemoptysis, or pleuritic pain. He denies nausea, vomiting, diarrhea,  constipation, blood in the stool, or change in weight. He certainly has  had a decreased appetite and does not feel like eating very much or  drinking very much. He denies urgency, frequency, dysuria, or hematuria  and actually states that his urine is dark and he is not going very much. His skin is without lesions, rashes, eruptions, urticaria, pruritus, or  itch. The endocrine system positive for diabetes. Negative for thyroid  disorder. The musculoskeletal system is generally benign, although he does  have chronic low back pain related to degenerative disk disease. The  psychiatric system is positive for depression and the neurologic system is  negative for seizures, CVA, tremors, or TIAs. PHYSICAL EXAMINATION:  GENERAL APPEARANCE:  Exam shows this to be a 46-year-old white male, in  moderate distress with the above complaints. HEENT:  Head, eyes, ears, nose, and throat examination shows the head to be  normocephalic and atraumatic. Pupils are equal and reactive to light and  accommodation.

## 2019-09-07 ENCOUNTER — HOSPITAL ENCOUNTER (EMERGENCY)
Age: 69
Discharge: HOME | End: 2019-09-07
Attending: EMERGENCY MEDICINE
Payer: MEDICARE

## 2019-09-07 ENCOUNTER — APPOINTMENT (OUTPATIENT)
Dept: GENERAL RADIOLOGY | Age: 69
End: 2019-09-07
Payer: MEDICARE

## 2019-09-07 VITALS
WEIGHT: 171 LBS | HEIGHT: 67 IN | DIASTOLIC BLOOD PRESSURE: 89 MMHG | SYSTOLIC BLOOD PRESSURE: 119 MMHG | TEMPERATURE: 97.5 F | BODY MASS INDEX: 26.84 KG/M2 | HEART RATE: 78 BPM | OXYGEN SATURATION: 97 % | RESPIRATION RATE: 18 BRPM

## 2019-09-07 DIAGNOSIS — I95.1 ORTHOSTATIC HYPOTENSION: ICD-10-CM

## 2019-09-07 DIAGNOSIS — R73.9 HYPERGLYCEMIA: Primary | ICD-10-CM

## 2019-09-07 LAB
ALBUMIN SERPL-MCNC: 4.4 G/DL (ref 3.5–5.2)
ALP BLD-CCNC: 115 U/L (ref 40–129)
ALT SERPL-CCNC: 14 U/L (ref 0–40)
ANION GAP SERPL CALCULATED.3IONS-SCNC: 11 MMOL/L (ref 7–16)
AST SERPL-CCNC: 18 U/L (ref 0–39)
BASOPHILS ABSOLUTE: 0.03 E9/L (ref 0–0.2)
BASOPHILS RELATIVE PERCENT: 0.5 % (ref 0–2)
BETA-HYDROXYBUTYRATE: 0.2 MMOL/L (ref 0.02–0.27)
BILIRUB SERPL-MCNC: 0.3 MG/DL (ref 0–1.2)
BILIRUBIN URINE: NEGATIVE
BLOOD, URINE: NEGATIVE
BUN BLDV-MCNC: 19 MG/DL (ref 8–23)
CALCIUM SERPL-MCNC: 9.6 MG/DL (ref 8.6–10.2)
CHLORIDE BLD-SCNC: 97 MMOL/L (ref 98–107)
CHP ED QC CHECK: YES
CLARITY: CLEAR
CO2: 26 MMOL/L (ref 22–29)
COLOR: YELLOW
CREAT SERPL-MCNC: 1.4 MG/DL (ref 0.7–1.2)
EKG ATRIAL RATE: 71 BPM
EKG P AXIS: -23 DEGREES
EKG P-R INTERVAL: 190 MS
EKG Q-T INTERVAL: 396 MS
EKG QRS DURATION: 120 MS
EKG QTC CALCULATION (BAZETT): 430 MS
EKG R AXIS: -73 DEGREES
EKG T AXIS: -15 DEGREES
EKG VENTRICULAR RATE: 71 BPM
EOSINOPHILS ABSOLUTE: 0.22 E9/L (ref 0.05–0.5)
EOSINOPHILS RELATIVE PERCENT: 3.6 % (ref 0–6)
GFR AFRICAN AMERICAN: >60
GFR NON-AFRICAN AMERICAN: 51 ML/MIN/1.73
GLUCOSE BLD-MCNC: 229 MG/DL
GLUCOSE BLD-MCNC: 494 MG/DL (ref 74–99)
GLUCOSE URINE: >=1000 MG/DL
HCT VFR BLD CALC: 38.5 % (ref 37–54)
HEMOGLOBIN: 12.6 G/DL (ref 12.5–16.5)
IMMATURE GRANULOCYTES #: 0.05 E9/L
IMMATURE GRANULOCYTES %: 0.8 % (ref 0–5)
KETONES, URINE: NEGATIVE MG/DL
LACTIC ACID: 1.6 MMOL/L (ref 0.5–2.2)
LEUKOCYTE ESTERASE, URINE: NEGATIVE
LYMPHOCYTES ABSOLUTE: 1.04 E9/L (ref 1.5–4)
LYMPHOCYTES RELATIVE PERCENT: 17.2 % (ref 20–42)
MAGNESIUM: 1.8 MG/DL (ref 1.6–2.6)
MCH RBC QN AUTO: 29.2 PG (ref 26–35)
MCHC RBC AUTO-ENTMCNC: 32.7 % (ref 32–34.5)
MCV RBC AUTO: 89.1 FL (ref 80–99.9)
METER GLUCOSE: 229 MG/DL (ref 74–99)
METER GLUCOSE: >500 MG/DL (ref 74–99)
MONOCYTES ABSOLUTE: 0.56 E9/L (ref 0.1–0.95)
MONOCYTES RELATIVE PERCENT: 9.3 % (ref 2–12)
NEUTROPHILS ABSOLUTE: 4.14 E9/L (ref 1.8–7.3)
NEUTROPHILS RELATIVE PERCENT: 68.6 % (ref 43–80)
NITRITE, URINE: NEGATIVE
PDW BLD-RTO: 13.4 FL (ref 11.5–15)
PH UA: 5.5 (ref 5–9)
PHOSPHORUS: 3.8 MG/DL (ref 2.5–4.5)
PLATELET # BLD: 202 E9/L (ref 130–450)
PMV BLD AUTO: 10.6 FL (ref 7–12)
POTASSIUM SERPL-SCNC: 4.4 MMOL/L (ref 3.5–5)
PRO-BNP: 302 PG/ML (ref 0–125)
PROTEIN UA: NEGATIVE MG/DL
RBC # BLD: 4.32 E12/L (ref 3.8–5.8)
SODIUM BLD-SCNC: 134 MMOL/L (ref 132–146)
SPECIFIC GRAVITY UA: 1.01 (ref 1–1.03)
TOTAL PROTEIN: 7.3 G/DL (ref 6.4–8.3)
UROBILINOGEN, URINE: 0.2 E.U./DL
WBC # BLD: 6 E9/L (ref 4.5–11.5)

## 2019-09-07 PROCEDURE — 94664 DEMO&/EVAL PT USE INHALER: CPT

## 2019-09-07 PROCEDURE — 2580000003 HC RX 258: Performed by: STUDENT IN AN ORGANIZED HEALTH CARE EDUCATION/TRAINING PROGRAM

## 2019-09-07 PROCEDURE — 82962 GLUCOSE BLOOD TEST: CPT

## 2019-09-07 PROCEDURE — 71045 X-RAY EXAM CHEST 1 VIEW: CPT

## 2019-09-07 PROCEDURE — 93005 ELECTROCARDIOGRAM TRACING: CPT | Performed by: STUDENT IN AN ORGANIZED HEALTH CARE EDUCATION/TRAINING PROGRAM

## 2019-09-07 PROCEDURE — 36415 COLL VENOUS BLD VENIPUNCTURE: CPT

## 2019-09-07 PROCEDURE — 84100 ASSAY OF PHOSPHORUS: CPT

## 2019-09-07 PROCEDURE — 96374 THER/PROPH/DIAG INJ IV PUSH: CPT

## 2019-09-07 PROCEDURE — 81003 URINALYSIS AUTO W/O SCOPE: CPT

## 2019-09-07 PROCEDURE — 83735 ASSAY OF MAGNESIUM: CPT

## 2019-09-07 PROCEDURE — 99284 EMERGENCY DEPT VISIT MOD MDM: CPT

## 2019-09-07 PROCEDURE — 93010 ELECTROCARDIOGRAM REPORT: CPT | Performed by: INTERNAL MEDICINE

## 2019-09-07 PROCEDURE — 80053 COMPREHEN METABOLIC PANEL: CPT

## 2019-09-07 PROCEDURE — 83880 ASSAY OF NATRIURETIC PEPTIDE: CPT

## 2019-09-07 PROCEDURE — 6370000000 HC RX 637 (ALT 250 FOR IP): Performed by: STUDENT IN AN ORGANIZED HEALTH CARE EDUCATION/TRAINING PROGRAM

## 2019-09-07 PROCEDURE — 82010 KETONE BODYS QUAN: CPT

## 2019-09-07 PROCEDURE — 83605 ASSAY OF LACTIC ACID: CPT

## 2019-09-07 PROCEDURE — 96361 HYDRATE IV INFUSION ADD-ON: CPT

## 2019-09-07 PROCEDURE — 85025 COMPLETE CBC W/AUTO DIFF WBC: CPT

## 2019-09-07 RX ORDER — IPRATROPIUM BROMIDE AND ALBUTEROL SULFATE 2.5; .5 MG/3ML; MG/3ML
1 SOLUTION RESPIRATORY (INHALATION) ONCE
Status: COMPLETED | OUTPATIENT
Start: 2019-09-07 | End: 2019-09-07

## 2019-09-07 RX ORDER — 0.9 % SODIUM CHLORIDE 0.9 %
1000 INTRAVENOUS SOLUTION INTRAVENOUS ONCE
Status: COMPLETED | OUTPATIENT
Start: 2019-09-07 | End: 2019-09-07

## 2019-09-07 RX ORDER — ASPIRIN 81 MG/1
81 TABLET ORAL DAILY
COMMUNITY

## 2019-09-07 RX ADMIN — INSULIN HUMAN 10 UNITS: 100 INJECTION, SOLUTION PARENTERAL at 08:22

## 2019-09-07 RX ADMIN — SODIUM CHLORIDE 1000 ML: 9 INJECTION, SOLUTION INTRAVENOUS at 09:07

## 2019-09-07 RX ADMIN — IPRATROPIUM BROMIDE AND ALBUTEROL SULFATE 1 AMPULE: .5; 3 SOLUTION RESPIRATORY (INHALATION) at 08:41

## 2019-09-07 NOTE — ED PROVIDER NOTES
complex medical decision making and emergency management    This patient has remained hemodynamically stable during their ED course. Re-Evaluations:           Re-evaluation. Patients symptoms are improving    Counseling: The emergency provider has spoken with the patient and discussed todays results, in addition to providing specific details for the plan of care and counseling regarding the diagnosis and prognosis. Questions are answered at this time and they are agreeable with the plan.     --------------------------------- IMPRESSION AND DISPOSITION ---------------------------------    IMPRESSION  1. Hyperglycemia    2.  Orthostatic hypotension        DISPOSITION  Disposition: Discharge to home  Patient condition is good    9/7/19, 7:24 AM.    This note is prepared by Burnadette Dandy, MD - PGY-2         Burnadette Dandy, MD  Resident  09/07/19 5175

## 2019-09-07 NOTE — ED NOTES
Bed: 21  Expected date:   Expected time:   Means of arrival:   Comments:  triage     Patrick Jade RN  09/07/19 [id number]

## 2021-03-24 ENCOUNTER — APPOINTMENT (OUTPATIENT)
Dept: GENERAL RADIOLOGY | Age: 71
DRG: 617 | End: 2021-03-24
Payer: MEDICARE

## 2021-03-24 ENCOUNTER — HOSPITAL ENCOUNTER (INPATIENT)
Age: 71
LOS: 5 days | Discharge: HOME | DRG: 617 | End: 2021-03-29
Attending: EMERGENCY MEDICINE | Admitting: GENERAL PRACTICE
Payer: MEDICARE

## 2021-03-24 ENCOUNTER — APPOINTMENT (OUTPATIENT)
Dept: MRI IMAGING | Age: 71
DRG: 617 | End: 2021-03-24
Payer: MEDICARE

## 2021-03-24 ENCOUNTER — ANESTHESIA (OUTPATIENT)
Dept: OPERATING ROOM | Age: 71
DRG: 617 | End: 2021-03-24
Payer: MEDICARE

## 2021-03-24 DIAGNOSIS — M86.9 OSTEOMYELITIS OF GREAT TOE OF LEFT FOOT (HCC): ICD-10-CM

## 2021-03-24 DIAGNOSIS — Z86.39 HISTORY OF DIABETES MELLITUS, TYPE II: ICD-10-CM

## 2021-03-24 DIAGNOSIS — S91.102A OPEN WOUND OF LEFT GREAT TOE, INITIAL ENCOUNTER: Primary | ICD-10-CM

## 2021-03-24 PROBLEM — L08.9 DIABETIC FOOT INFECTION (HCC): Status: ACTIVE | Noted: 2021-03-24

## 2021-03-24 PROBLEM — E11.628 DIABETIC FOOT INFECTION (HCC): Status: ACTIVE | Noted: 2021-03-24

## 2021-03-24 LAB
ALBUMIN SERPL-MCNC: 4.1 G/DL (ref 3.5–5.2)
ALP BLD-CCNC: 90 U/L (ref 40–129)
ALT SERPL-CCNC: 15 U/L (ref 0–40)
ANION GAP SERPL CALCULATED.3IONS-SCNC: 9 MMOL/L (ref 7–16)
AST SERPL-CCNC: 26 U/L (ref 0–39)
BASOPHILS ABSOLUTE: 0.04 E9/L (ref 0–0.2)
BASOPHILS RELATIVE PERCENT: 0.6 % (ref 0–2)
BILIRUB SERPL-MCNC: 0.3 MG/DL (ref 0–1.2)
BUN BLDV-MCNC: 20 MG/DL (ref 8–23)
C-REACTIVE PROTEIN: 1.4 MG/DL (ref 0–0.4)
CALCIUM SERPL-MCNC: 9.9 MG/DL (ref 8.6–10.2)
CHLORIDE BLD-SCNC: 97 MMOL/L (ref 98–107)
CO2: 25 MMOL/L (ref 22–29)
CREAT SERPL-MCNC: 1.3 MG/DL (ref 0.7–1.2)
EOSINOPHILS ABSOLUTE: 0.27 E9/L (ref 0.05–0.5)
EOSINOPHILS RELATIVE PERCENT: 4.4 % (ref 0–6)
GFR AFRICAN AMERICAN: >60
GFR NON-AFRICAN AMERICAN: 55 ML/MIN/1.73
GLUCOSE BLD-MCNC: 293 MG/DL (ref 74–99)
HCT VFR BLD CALC: 39 % (ref 37–54)
HEMOGLOBIN: 13.1 G/DL (ref 12.5–16.5)
IMMATURE GRANULOCYTES #: 0.04 E9/L
IMMATURE GRANULOCYTES %: 0.6 % (ref 0–5)
LACTIC ACID: 1.9 MMOL/L (ref 0.5–2.2)
LYMPHOCYTES ABSOLUTE: 1.48 E9/L (ref 1.5–4)
LYMPHOCYTES RELATIVE PERCENT: 23.9 % (ref 20–42)
MCH RBC QN AUTO: 28.4 PG (ref 26–35)
MCHC RBC AUTO-ENTMCNC: 33.6 % (ref 32–34.5)
MCV RBC AUTO: 84.4 FL (ref 80–99.9)
METER GLUCOSE: 184 MG/DL (ref 74–99)
MONOCYTES ABSOLUTE: 0.41 E9/L (ref 0.1–0.95)
MONOCYTES RELATIVE PERCENT: 6.6 % (ref 2–12)
NEUTROPHILS ABSOLUTE: 3.94 E9/L (ref 1.8–7.3)
NEUTROPHILS RELATIVE PERCENT: 63.9 % (ref 43–80)
PDW BLD-RTO: 14 FL (ref 11.5–15)
PLATELET # BLD: 219 E9/L (ref 130–450)
PMV BLD AUTO: 10.1 FL (ref 7–12)
POTASSIUM REFLEX MAGNESIUM: 4.2 MMOL/L (ref 3.5–5)
RBC # BLD: 4.62 E12/L (ref 3.8–5.8)
SEDIMENTATION RATE, ERYTHROCYTE: 22 MM/HR (ref 0–15)
SODIUM BLD-SCNC: 131 MMOL/L (ref 132–146)
TOTAL PROTEIN: 7 G/DL (ref 6.4–8.3)
WBC # BLD: 6.2 E9/L (ref 4.5–11.5)

## 2021-03-24 PROCEDURE — 85025 COMPLETE CBC W/AUTO DIFF WBC: CPT

## 2021-03-24 PROCEDURE — 82962 GLUCOSE BLOOD TEST: CPT

## 2021-03-24 PROCEDURE — 86140 C-REACTIVE PROTEIN: CPT

## 2021-03-24 PROCEDURE — 87077 CULTURE AEROBIC IDENTIFY: CPT

## 2021-03-24 PROCEDURE — 83605 ASSAY OF LACTIC ACID: CPT

## 2021-03-24 PROCEDURE — 73718 MRI LOWER EXTREMITY W/O DYE: CPT

## 2021-03-24 PROCEDURE — 6370000000 HC RX 637 (ALT 250 FOR IP): Performed by: GENERAL PRACTICE

## 2021-03-24 PROCEDURE — 99284 EMERGENCY DEPT VISIT MOD MDM: CPT

## 2021-03-24 PROCEDURE — 73630 X-RAY EXAM OF FOOT: CPT

## 2021-03-24 PROCEDURE — 85651 RBC SED RATE NONAUTOMATED: CPT

## 2021-03-24 PROCEDURE — 1200000000 HC SEMI PRIVATE

## 2021-03-24 PROCEDURE — 80053 COMPREHEN METABOLIC PANEL: CPT

## 2021-03-24 PROCEDURE — 87186 SC STD MICRODIL/AGAR DIL: CPT

## 2021-03-24 PROCEDURE — 87070 CULTURE OTHR SPECIMN AEROBIC: CPT

## 2021-03-24 PROCEDURE — 87040 BLOOD CULTURE FOR BACTERIA: CPT

## 2021-03-24 PROCEDURE — 87075 CULTR BACTERIA EXCEPT BLOOD: CPT

## 2021-03-24 RX ORDER — FENOFIBRATE 160 MG/1
160 TABLET ORAL DAILY
Status: DISCONTINUED | OUTPATIENT
Start: 2021-03-24 | End: 2021-03-29 | Stop reason: HOSPADM

## 2021-03-24 RX ORDER — INSULIN GLARGINE 100 [IU]/ML
15 INJECTION, SOLUTION SUBCUTANEOUS DAILY
COMMUNITY

## 2021-03-24 RX ORDER — INSULIN GLARGINE 100 [IU]/ML
15 INJECTION, SOLUTION SUBCUTANEOUS DAILY
Status: DISCONTINUED | OUTPATIENT
Start: 2021-03-24 | End: 2021-03-29 | Stop reason: HOSPADM

## 2021-03-24 RX ORDER — FAMOTIDINE 20 MG/1
20 TABLET, FILM COATED ORAL 2 TIMES DAILY
Status: DISCONTINUED | OUTPATIENT
Start: 2021-03-24 | End: 2021-03-29 | Stop reason: HOSPADM

## 2021-03-24 RX ORDER — ASPIRIN 81 MG/1
81 TABLET ORAL DAILY
Status: DISCONTINUED | OUTPATIENT
Start: 2021-03-24 | End: 2021-03-29 | Stop reason: HOSPADM

## 2021-03-24 RX ORDER — ALOGLIPTIN 12.5 MG/1
12.5 TABLET, FILM COATED ORAL DAILY
Status: DISCONTINUED | OUTPATIENT
Start: 2021-03-24 | End: 2021-03-29 | Stop reason: HOSPADM

## 2021-03-24 RX ORDER — GLIPIZIDE 5 MG/1
10 TABLET ORAL
Status: DISCONTINUED | OUTPATIENT
Start: 2021-03-25 | End: 2021-03-29 | Stop reason: HOSPADM

## 2021-03-24 RX ORDER — HYDROXYZINE HYDROCHLORIDE 10 MG/1
25 TABLET, FILM COATED ORAL NIGHTLY
Status: DISCONTINUED | OUTPATIENT
Start: 2021-03-24 | End: 2021-03-26 | Stop reason: CLARIF

## 2021-03-24 RX ORDER — ROSUVASTATIN CALCIUM 20 MG/1
40 TABLET, COATED ORAL NIGHTLY
Status: DISCONTINUED | OUTPATIENT
Start: 2021-03-24 | End: 2021-03-29 | Stop reason: HOSPADM

## 2021-03-24 RX ORDER — DIAZEPAM 5 MG/1
5 TABLET ORAL DAILY PRN
COMMUNITY

## 2021-03-24 RX ORDER — VITAMIN B COMPLEX
2000 TABLET ORAL
Status: DISCONTINUED | OUTPATIENT
Start: 2021-03-25 | End: 2021-03-29 | Stop reason: HOSPADM

## 2021-03-24 RX ORDER — CLONIDINE HYDROCHLORIDE 0.2 MG/1
0.2 TABLET ORAL 2 TIMES DAILY
Status: DISCONTINUED | OUTPATIENT
Start: 2021-03-24 | End: 2021-03-29 | Stop reason: HOSPADM

## 2021-03-24 RX ADMIN — FAMOTIDINE 20 MG: 20 TABLET ORAL at 20:36

## 2021-03-24 RX ADMIN — HYDROXYZINE HYDROCHLORIDE 25 MG: 10 TABLET, FILM COATED ORAL at 20:36

## 2021-03-24 RX ADMIN — ROSUVASTATIN CALCIUM 40 MG: 20 TABLET, FILM COATED ORAL at 20:36

## 2021-03-24 RX ADMIN — CLONIDINE HYDROCHLORIDE 0.2 MG: 0.2 TABLET ORAL at 20:36

## 2021-03-24 ASSESSMENT — PAIN SCALES - GENERAL: PAINLEVEL_OUTOF10: 0

## 2021-03-24 NOTE — ED PROVIDER NOTES
headaches  Hematological: Negative for adenopathy    All other systems reviewed and are negative      -------------------------------- PAST HISTORY ----------------------------------  Past Medical History:  has a past medical history of [facility] Depression, Diabetes mellitus ([facility] Hypertension, and Neuropathy. Past Surgical History:  has a past surgical history that includes back surgery. Social History:  reports that he has been smoking. He has been smoking about 0.25 packs per day. He has never used smokeless tobacco. He reports that he does not drink alcohol or use drugs. Family History: family history is not on file. The patients home medications have been reviewed. Allergies: Patient has no known allergies.     --------------------------------- RESULTS ------------------------------------------  All laboratory and radiology results have been personally reviewed by myself   LABS:  Results for orders placed or performed during the hospital encounter of 03/24/21   CBC Auto Differential   Result Value Ref Range    WBC 6.2 4.5 - 11.5 E9/L    RBC 4.62 3.80 - 5.80 E12/L    Hemoglobin 13.1 12.5 - 16.5 g/dL    Hematocrit 39.0 37.0 - 54.0 %    MCV 84.4 80.0 - 99.9 fL    MCH 28.4 26.0 - 35.0 pg    MCHC 33.6 32.0 - 34.5 %    RDW 14.0 11.5 - 15.0 fL    Platelets 098 145 - 397 E9/L    MPV 10.1 7.0 - 12.0 fL    Neutrophils % 63.9 43.0 - 80.0 %    Immature Granulocytes % 0.6 0.0 - 5.0 %    Lymphocytes % 23.9 20.0 - 42.0 %    Monocytes % 6.6 2.0 - 12.0 %    Eosinophils % 4.4 0.0 - 6.0 %    Basophils % 0.6 0.0 - 2.0 %    Neutrophils Absolute 3.94 1.80 - 7.30 E9/L    Immature Granulocytes # 0.04 E9/L    Lymphocytes Absolute 1.48 (L) 1.50 - 4.00 E9/L    Monocytes Absolute 0.41 0.10 - 0.95 E9/L    Eosinophils Absolute 0.27 0.05 - 0.50 E9/L    Basophils Absolute 0.04 0.00 - 0.20 E9/L   Comprehensive Metabolic Panel w/ Reflex to MG   Result Value Ref Range    Sodium 131 (L) 132 - 146 mmol/L    Potassium reflex Magnesium 4.2 3.5 - 5.0 mmol/L    Chloride 97 (L) 98 - 107 mmol/L    CO2 25 22 - 29 mmol/L    Anion Gap 9 7 - 16 mmol/L    Glucose 293 (H) 74 - 99 mg/dL    BUN 20 8 - 23 mg/dL    CREATININE 1.3 (H) 0.7 - 1.2 mg/dL    GFR Non-African American 55 >=60 mL/min/1.73    GFR African American >60     Calcium 9.9 8.6 - 10.2 mg/dL    Total Protein 7.0 6.4 - 8.3 g/dL    Albumin 4.1 3.5 - 5.2 g/dL    Total Bilirubin 0.3 0.0 - 1.2 mg/dL    Alkaline Phosphatase 90 40 - 129 U/L    ALT 15 0 - 40 U/L    AST 26 0 - 39 U/L   Lactic Acid, Plasma   Result Value Ref Range    Lactic Acid 1.9 0.5 - 2.2 mmol/L   C-Reactive Protein   Result Value Ref Range    CRP 1.4 (H) 0.0 - 0.4 mg/dL   Sedimentation Rate   Result Value Ref Range    Sed Rate 22 (H) 0 - 15 mm/Hr       RADIOLOGY:  Interpreted by Radiologist.  XR FOOT LEFT (MIN 3 VIEWS)   Final Result   1. 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 2.  No radiographic evidence of osteomyelitis.             ----------------- NURSING NOTES AND VITALS REVIEWED ---------------   The nursing notes within the ED encounter and vital signs as below have been reviewed. BP (!) 154/84   Pulse 83   Temp 97 °F (36.1 °C) (Tympanic)   Resp 14   Ht 5' 7\" (1.702 m)   Wt 171 lb (77.6 kg)   SpO2 97%   BMI 26.78 kg/m²   Oxygen Saturation Interpretation: Normal      --------------------------------PHYSICAL EXAM------------------------------------      Constitutional/General: Alert and oriented x3, well appearing, non toxic in NAD  Head: NC/AT  Eyes: PERRL, EOMI  Mouth: Oropharynx clear, handling secretions, no trismus  Neck: Supple, full ROM, no meningeal signs  Pulmonary: Lungs clear to auscultation bilaterally, no wheezes, rales, or rhonchi. Not in respiratory distress  Cardiovascular:  Regular rate and rhythm, no murmurs, gallops, or rubs. 2+ distal pulses  Extremities: Moves all extremities x 4. Exam of left foot remarkable for fresh excision medial great nail bed.    Fresh blood seen but no visible pus or drainage. Minimal erythema left great toe. No significant cellulitis and no redness that extends to left foot. ROM intact. Pulses intact. Warm and well perfused  Skin: warm and dry without rash  Neurologic: GCS 15,  Intact. No focal deficits  Psych: Normal Affect      ------------------------ ED COURSE/MEDICAL DECISION MAKING----------------------  Medications - No data to display      Medical Decision Making:    Sent in by podiatrist with concern for infection in left great toe. The plan is to proceed with bone biopsy. Cultures sent and pending. Dr. Akash Gusman spoke to Dr. Javier Muas regarding admission. Counseling: The emergency provider has spoken with the patient and discussed todays results, in addition to providing specific details for the plan of care and counseling regarding the diagnosis and prognosis. Questions are answered at this time and they are agreeable with the plan.      ------------------------ IMPRESSION AND DISPOSITION -------------------------------    IMPRESSION  1. Open wound of left great toe, initial encounter    2.  History of diabetes mellitus, type II        DISPOSITION  Disposition: Discharge to home  Patient condition is stable                   Yanick Ha PA-C  03/24/21 6060

## 2021-03-24 NOTE — CONSULTS
Department of Podiatry   Consult Note      DATE OF SERVICE:  3/24/2021    Admit Date: 3/24/2021    CONSULTANT:  Gabrielle Emerson D.P.M. REFERRING PHYSICIAN:  Sophronia Rinne, D.O.    REASON FOR CONSULTATION: Infection, left great toe. Rule out osteomyelitis. CHIEF COMPLAINT:  Infection/abscess, left great toe. HISTORY OF PRESENT ILLNESS:  The patient is a 78 y.o. male who is seen at [address] [place] [address] in [facility] today. He was seen in my office earlier today for mycotic nail care. During the examination in my office, an abscess was seen on the left great toe. The medial nail border had cut into the skin, resulting in the abscess. More than 1 cc of creamy yellow pus was present. Culture and sensitivity was taken from the drainage. A sterile elevator was used to probe through the cut skin at the medial nail border, and it probed down to the bone at the distal phalanx. X-rays were taken and there was an area of radiolucency in the distal phalanx. This area of radiolucency in the bone was more distinct than what was seen on x-rays from 2 years ago. A decision was then made to hospitalize patient for further testing, an infectious disease consultation, and probable bone biopsy and culture. Patient recalls no history of injury to the left great toe. PAST MEDICAL HISTORY:        Diagnosis Date    Dementia ([facility]     Depression     Diabetes mellitus ([facility]     Hypertension     Neuropathy        PAST SURGICAL HISTORY:        Procedure Laterality Date    BACK SURGERY         FAMILY HISTORY:   History reviewed. No pertinent family history. ALLERGIES: Patient has no known allergies.     MEDICATIONS:   Scheduled Meds:   aspirin  81 mg Oral Daily    cloNIDine  0.2 mg Oral BID    [START ON 3/25/2021] Vitamin D  2,000 Units Oral Lunch    famotidine  20 mg Oral BID    fenofibrate  160 mg Oral Daily    [START ON 3/25/2021] glipiZIDE  10 mg Oral BID AC    hydrOXYzine  25 mg Oral Nightly    insulin glargine  15 Units Subcutaneous Daily    magnesium oxide  400 mg Oral Daily    metFORMIN  500 mg Oral BID WC    rosuvastatin  40 mg Oral Nightly    sertraline  50 mg Oral Daily    alogliptin  12.5 mg Oral Daily     Continuous Infusions:  PRN Meds:    SOCIAL HISTORY:    ETOH:   reports no history of alcohol use. TOBACCO:   reports that he has been smoking. He has been smoking about 0.25 packs per day. He has never used smokeless tobacco.    REVIEW OF SYSTEMS:  Pertinent finding is diabetes with peripheral neuropathy. PHYSICAL EXAM:    Vitals:    BP (!) 155/85   Pulse 70   Temp 97.7 °F (36.5 °C) (Oral)   Resp 18   Ht 5' 7\" (1.702 m)   Wt 171 lb (77.6 kg)   SpO2 96%   BMI 26.78 kg/m²     Vascular:        Pedal pulses:                Dorsalis Pedis:  not palpable on right foot,  palpable on left foot. Posterior tibial:  palpable on right foot,  not palpable on left foot. Capillary fill time is 1 second in all digits on both feet. The left foot is warmer than the right. .                Mild edema at the left great toe. Musculoskeletal:                Muscle strength:                     Dorsiflexors:  Left: 5/5  Right:  5/5                     Plantarflexors:  Left: 5/5  Right:  5/5                     Inverters:  Left: 5/5  Right:  5/5                     Everters:  Left: 5/5  Right:  5/5                Range of motion:                       No pain or crepitus with range of motion of either foot or ankle. Range of motion is limited at the 1st MTP joint on each foot. Inspection and palpation of both feet and ankles reveals:                     No masses on either foot or ankle. The 1st MTP joint is enlarged on each foot. Patient ambulates without limping. X-ray examination:                  Consisted of 3 views of the left foot and showed an area of radiolucency in the distal phalanx of the great toe.   Joint narrowing and osteophytic lipping seen at the 1st MTP joint. No fractures or dislocations. Skin/Nails:                  Erythema at the eponychium area of the left great toe. An abscess was incised and drained from the left great toe earlier today, and the offending medial nail border was debrided away from the skin. There is a pinhole opening in the skin at the medial nail fold on the left great toe. Earlier today, this pinhole opening tracked down to bone, at the distal phalanx. Thickened and yellowed nails are present on digits 1 and 3 on the right foot. Neurologic:                Sharp sensation is absent on both feet. ASSESSMENT:    1. Abscess, left great toe. (Incision and drainage of abscess, left great toe, performed earlier today.)  2. Infection, left great toe. 3.  Rule out osteomyelitis, left great toe. 4.  Diabetes with peripheral neuropathy. Patient Active Problem List:     Rhabdomyolysis     Delirium due to another medical condition     Diabetic foot infection ([facility]      RECOMMENDATIONS:    1.  X-rays of the left foot already performed. I spoke directly with the radiologist, Dr. Garett Boone, regarding the x-ray examination. He felt the area of radiolucency in the distal phalanx of the left great toe showed well defined cortical margins that are not typical of osteomyelitis. He was in agreement with my plan to obtain an MRI of the left foot, and noted there have been other cases where the x-rays don't show osteomyelitis in the foot, but the MRI does. 2.  MRI examination of the left foot. The purpose of the MRI examination is to help determine whether osteomyelitis is present in the distal phalanx of the left great toe. 3.  Infectious disease consultation. 4.  Vascular testing ordered to determine whether peripheral circulation is adequate for healing a surgical procedure on the left great toe.   5.  Bone biopsy and culture for the distal phalanx of the left great toe is planned for tomorrow night (3/25/21) to determine the presence of osteomyelitis and help direct antibiotic choice for infectious disease. Since the left great toe abscess tracked to bone, it created a situation for imminent osteomyelitis. I would consider cancelling the procedure if the MRI is negative for osteomyelitis, and infectious disease did not feel the bone biopsy and culture was then necessary. I would appreciate input from infectious disease.

## 2021-03-24 NOTE — ED NOTES
Bed:  [place] [address]  Expected date:   Expected time:   Means of arrival:   Comments:  WILLY Doran  03/24/21 6029

## 2021-03-24 NOTE — ED NOTES
Spoke to jannet at ext [phone], confirmed sbar.  Pt prepared for transport, room 521 stephanie [address] [place], RN  03/24/21 7185

## 2021-03-24 NOTE — PROGRESS NOTES
Call placed to Dr. Ector Hunter regarding need for admit orders.     Electronically signed by Yadira Angeles RN on 3/24/2021 at 6:31 PM

## 2021-03-25 ENCOUNTER — ANESTHESIA EVENT (OUTPATIENT)
Dept: OPERATING ROOM | Age: 71
DRG: 617 | End: 2021-03-25
Payer: MEDICARE

## 2021-03-25 ENCOUNTER — APPOINTMENT (OUTPATIENT)
Dept: GENERAL RADIOLOGY | Age: 71
DRG: 617 | End: 2021-03-25
Payer: MEDICARE

## 2021-03-25 ENCOUNTER — APPOINTMENT (OUTPATIENT)
Dept: INTERVENTIONAL RADIOLOGY/VASCULAR | Age: 71
DRG: 617 | End: 2021-03-25
Payer: MEDICARE

## 2021-03-25 VITALS — DIASTOLIC BLOOD PRESSURE: 76 MMHG | OXYGEN SATURATION: 100 % | SYSTOLIC BLOOD PRESSURE: 137 MMHG

## 2021-03-25 LAB
ALBUMIN SERPL-MCNC: 3.8 G/DL (ref 3.5–5.2)
ALP BLD-CCNC: 59 U/L (ref 40–129)
ALT SERPL-CCNC: 15 U/L (ref 0–40)
ANION GAP SERPL CALCULATED.3IONS-SCNC: 8 MMOL/L (ref 7–16)
ANTISTREPTOLYSIN-O: 39 IU/ML (ref 0–200)
AST SERPL-CCNC: 27 U/L (ref 0–39)
BILIRUB SERPL-MCNC: 0.2 MG/DL (ref 0–1.2)
BUN BLDV-MCNC: 22 MG/DL (ref 8–23)
C-REACTIVE PROTEIN: 1.2 MG/DL (ref 0–0.4)
CALCIUM SERPL-MCNC: 8.8 MG/DL (ref 8.6–10.2)
CHLORIDE BLD-SCNC: 99 MMOL/L (ref 98–107)
CO2: 24 MMOL/L (ref 22–29)
CREAT SERPL-MCNC: 1.2 MG/DL (ref 0.7–1.2)
GFR AFRICAN AMERICAN: >60
GFR NON-AFRICAN AMERICAN: >60 ML/MIN/1.73
GLUCOSE BLD-MCNC: 285 MG/DL (ref 74–99)
HCT VFR BLD CALC: 38.1 % (ref 37–54)
HEMOGLOBIN: 12.8 G/DL (ref 12.5–16.5)
INR BLD: 1
MCH RBC QN AUTO: 28.4 PG (ref 26–35)
MCHC RBC AUTO-ENTMCNC: 33.6 % (ref 32–34.5)
MCV RBC AUTO: 84.7 FL (ref 80–99.9)
METER GLUCOSE: 142 MG/DL (ref 74–99)
METER GLUCOSE: 291 MG/DL (ref 74–99)
PDW BLD-RTO: 14.2 FL (ref 11.5–15)
PLATELET # BLD: 219 E9/L (ref 130–450)
PMV BLD AUTO: 10.1 FL (ref 7–12)
POTASSIUM SERPL-SCNC: 4.3 MMOL/L (ref 3.5–5)
PROTHROMBIN TIME: 10.8 SEC (ref 9.3–12.4)
RBC # BLD: 4.5 E12/L (ref 3.8–5.8)
SEDIMENTATION RATE, ERYTHROCYTE: 38 MM/HR (ref 0–15)
SODIUM BLD-SCNC: 131 MMOL/L (ref 132–146)
TOTAL PROTEIN: 6.5 G/DL (ref 6.4–8.3)
WBC # BLD: 6.9 E9/L (ref 4.5–11.5)

## 2021-03-25 PROCEDURE — 6370000000 HC RX 637 (ALT 250 FOR IP): Performed by: GENERAL PRACTICE

## 2021-03-25 PROCEDURE — 86060 ANTISTREPTOLYSIN O TITER: CPT

## 2021-03-25 PROCEDURE — 88307 TISSUE EXAM BY PATHOLOGIST: CPT

## 2021-03-25 PROCEDURE — 87075 CULTR BACTERIA EXCEPT BLOOD: CPT

## 2021-03-25 PROCEDURE — 3600000012 HC SURGERY LEVEL 2 ADDTL 15MIN: Performed by: PODIATRIST

## 2021-03-25 PROCEDURE — 7100000011 HC PHASE II RECOVERY - ADDTL 15 MIN: Performed by: PODIATRIST

## 2021-03-25 PROCEDURE — 3209999900 FLUORO FOR SURGICAL PROCEDURES

## 2021-03-25 PROCEDURE — 7100000010 HC PHASE II RECOVERY - FIRST 15 MIN: Performed by: PODIATRIST

## 2021-03-25 PROCEDURE — 6360000002 HC RX W HCPCS: Performed by: SPECIALIST

## 2021-03-25 PROCEDURE — 2709999900 HC NON-CHARGEABLE SUPPLY: Performed by: PODIATRIST

## 2021-03-25 PROCEDURE — 93923 UPR/LXTR ART STDY 3+ LVLS: CPT

## 2021-03-25 PROCEDURE — 88311 DECALCIFY TISSUE: CPT

## 2021-03-25 PROCEDURE — 36415 COLL VENOUS BLD VENIPUNCTURE: CPT

## 2021-03-25 PROCEDURE — 6360000002 HC RX W HCPCS: Performed by: NURSE ANESTHETIST, CERTIFIED REGISTERED

## 2021-03-25 PROCEDURE — 86140 C-REACTIVE PROTEIN: CPT

## 2021-03-25 PROCEDURE — 1200000000 HC SEMI PRIVATE

## 2021-03-25 PROCEDURE — 3700000001 HC ADD 15 MINUTES (ANESTHESIA): Performed by: PODIATRIST

## 2021-03-25 PROCEDURE — 85651 RBC SED RATE NONAUTOMATED: CPT

## 2021-03-25 PROCEDURE — 87070 CULTURE OTHR SPECIMN AEROBIC: CPT

## 2021-03-25 PROCEDURE — 3600000002 HC SURGERY LEVEL 2 BASE: Performed by: PODIATRIST

## 2021-03-25 PROCEDURE — 80053 COMPREHEN METABOLIC PANEL: CPT

## 2021-03-25 PROCEDURE — 85610 PROTHROMBIN TIME: CPT

## 2021-03-25 PROCEDURE — 2580000003 HC RX 258: Performed by: NURSE ANESTHETIST, CERTIFIED REGISTERED

## 2021-03-25 PROCEDURE — 0QBR0ZX EXCISION OF LEFT TOE PHALANX, OPEN APPROACH, DIAGNOSTIC: ICD-10-PCS | Performed by: PODIATRIST

## 2021-03-25 PROCEDURE — 82962 GLUCOSE BLOOD TEST: CPT

## 2021-03-25 PROCEDURE — 2580000003 HC RX 258: Performed by: PODIATRIST

## 2021-03-25 PROCEDURE — 2500000003 HC RX 250 WO HCPCS: Performed by: PODIATRIST

## 2021-03-25 PROCEDURE — 85027 COMPLETE CBC AUTOMATED: CPT

## 2021-03-25 PROCEDURE — 3700000000 HC ANESTHESIA ATTENDED CARE: Performed by: PODIATRIST

## 2021-03-25 RX ORDER — SODIUM CHLORIDE 9 MG/ML
INJECTION, SOLUTION INTRAVENOUS CONTINUOUS PRN
Status: DISCONTINUED | OUTPATIENT
Start: 2021-03-25 | End: 2021-03-25 | Stop reason: SDUPTHER

## 2021-03-25 RX ORDER — PROPOFOL 10 MG/ML
INJECTION, EMULSION INTRAVENOUS PRN
Status: DISCONTINUED | OUTPATIENT
Start: 2021-03-25 | End: 2021-03-25 | Stop reason: SDUPTHER

## 2021-03-25 RX ORDER — SODIUM CHLORIDE 0.9 % (FLUSH) 0.9 %
10 SYRINGE (ML) INJECTION PRN
Status: DISCONTINUED | OUTPATIENT
Start: 2021-03-25 | End: 2021-03-25 | Stop reason: HOSPADM

## 2021-03-25 RX ORDER — SODIUM CHLORIDE 0.9 % (FLUSH) 0.9 %
10 SYRINGE (ML) INJECTION EVERY 12 HOURS SCHEDULED
Status: DISCONTINUED | OUTPATIENT
Start: 2021-03-25 | End: 2021-03-25 | Stop reason: HOSPADM

## 2021-03-25 RX ORDER — HYDROCODONE BITARTRATE AND ACETAMINOPHEN 5; 325 MG/1; MG/1
2 TABLET ORAL PRN
Status: DISCONTINUED | OUTPATIENT
Start: 2021-03-25 | End: 2021-03-25 | Stop reason: HOSPADM

## 2021-03-25 RX ORDER — BUPIVACAINE HYDROCHLORIDE 5 MG/ML
INJECTION, SOLUTION EPIDURAL; INTRACAUDAL PRN
Status: DISCONTINUED | OUTPATIENT
Start: 2021-03-25 | End: 2021-03-25 | Stop reason: HOSPADM

## 2021-03-25 RX ORDER — MIDAZOLAM HYDROCHLORIDE 1 MG/ML
INJECTION INTRAMUSCULAR; INTRAVENOUS PRN
Status: DISCONTINUED | OUTPATIENT
Start: 2021-03-25 | End: 2021-03-25 | Stop reason: SDUPTHER

## 2021-03-25 RX ORDER — HYDROCODONE BITARTRATE AND ACETAMINOPHEN 5; 325 MG/1; MG/1
1 TABLET ORAL PRN
Status: DISCONTINUED | OUTPATIENT
Start: 2021-03-25 | End: 2021-03-25 | Stop reason: HOSPADM

## 2021-03-25 RX ORDER — PROPOFOL 10 MG/ML
INJECTION, EMULSION INTRAVENOUS CONTINUOUS PRN
Status: DISCONTINUED | OUTPATIENT
Start: 2021-03-25 | End: 2021-03-25 | Stop reason: SDUPTHER

## 2021-03-25 RX ADMIN — SERTRALINE HYDROCHLORIDE 50 MG: 50 TABLET ORAL at 09:25

## 2021-03-25 RX ADMIN — FAMOTIDINE 20 MG: 20 TABLET ORAL at 09:25

## 2021-03-25 RX ADMIN — ROSUVASTATIN CALCIUM 40 MG: 20 TABLET, FILM COATED ORAL at 22:22

## 2021-03-25 RX ADMIN — FENOFIBRATE 160 MG: 160 TABLET ORAL at 09:25

## 2021-03-25 RX ADMIN — METFORMIN HYDROCHLORIDE 500 MG: 500 TABLET ORAL at 09:26

## 2021-03-25 RX ADMIN — MIDAZOLAM 1 MG: 1 INJECTION INTRAMUSCULAR; INTRAVENOUS at 20:41

## 2021-03-25 RX ADMIN — HYDROXYZINE HYDROCHLORIDE 25 MG: 10 TABLET, FILM COATED ORAL at 22:22

## 2021-03-25 RX ADMIN — SODIUM CHLORIDE: 9 INJECTION, SOLUTION INTRAVENOUS at 20:37

## 2021-03-25 RX ADMIN — PROPOFOL 100 MG: 10 INJECTION, EMULSION INTRAVENOUS at 20:41

## 2021-03-25 RX ADMIN — INSULIN GLARGINE 15 UNITS: 100 INJECTION, SOLUTION SUBCUTANEOUS at 09:26

## 2021-03-25 RX ADMIN — ALOGLIPTIN 12.5 MG: 12.5 TABLET, FILM COATED ORAL at 09:25

## 2021-03-25 RX ADMIN — CLONIDINE HYDROCHLORIDE 0.2 MG: 0.2 TABLET ORAL at 09:25

## 2021-03-25 RX ADMIN — GLIPIZIDE 10 MG: 5 TABLET ORAL at 06:15

## 2021-03-25 RX ADMIN — CLONIDINE HYDROCHLORIDE 0.2 MG: 0.2 TABLET ORAL at 22:22

## 2021-03-25 RX ADMIN — Medication 10 ML: at 09:26

## 2021-03-25 RX ADMIN — Medication 2 G: at 21:09

## 2021-03-25 RX ADMIN — MIDAZOLAM 1 MG: 1 INJECTION INTRAMUSCULAR; INTRAVENOUS at 20:37

## 2021-03-25 RX ADMIN — FAMOTIDINE 20 MG: 20 TABLET ORAL at 22:22

## 2021-03-25 RX ADMIN — PROPOFOL 50 MCG/KG/MIN: 10 INJECTION, EMULSION INTRAVENOUS at 20:41

## 2021-03-25 RX ADMIN — Medication 400 MG: at 09:25

## 2021-03-25 ASSESSMENT — PULMONARY FUNCTION TESTS
PIF_VALUE: 0

## 2021-03-25 ASSESSMENT — LIFESTYLE VARIABLES: SMOKING_STATUS: 1

## 2021-03-25 ASSESSMENT — PAIN SCALES - GENERAL
PAINLEVEL_OUTOF10: 0
PAINLEVEL_OUTOF10: 0

## 2021-03-25 NOTE — PROGRESS NOTES
Updated OR on Dr. Julia Lee request for a Jamshidi needle and c-arm for surgery tonight.      Electronically signed by Airam Pearson RN on 3/25/2021 at 10:31 AM

## 2021-03-25 NOTE — ANESTHESIA PRE PROCEDURE
Department of Anesthesiology  Preprocedure Note       Name:  aRmo Fonseca   Age:  78 y.o.  :  1950                                          MRN:  [id number]         Date:  3/25/2021      Surgeon: Romulo Cruz):  Vanessa Roa DPM    Procedure: Procedure(s):  BONE BIOPSY AND CULTURE, PERCUTANEOUS LEFT FOOT    Medications prior to admission:   Prior to Admission medications    Medication Sig Start Date End Date Taking? Authorizing Provider   insulin glargine (LANTUS) 100 UNIT/ML injection vial Inject 15 Units into the skin daily    Yes Historical Provider, MD   diazePAM (VALIUM) 5 MG tablet Take 5 mg by mouth daily as needed for Anxiety.    Yes Historical Provider, MD   COVID-19 mRNA Vacc, PFIZER, 30 MCG/0.3ML SUSP injection Inject 0.3 mLs into the muscle once *FIRST DOSE: 2021  SECOND DOSE:2021*   Yes Historical Provider, MD   influenza virus trivalent vaccine (FLUZONE) injection Inject 0.5 mLs into the muscle once *GIVEN AT [facility]*   Yes Historical Provider, MD   aspirin 81 MG EC tablet Take 81 mg by mouth daily   Yes Historical Provider, MD   SITagliptin (JANUVIA) 50 MG tablet Take 50 mg by mouth Daily with lunch    Yes Historical Provider, MD   metFORMIN (GLUCOPHAGE) 500 MG tablet Take 500 mg by mouth 2 times daily (with meals)   Yes Historical Provider, MD   sertraline (ZOLOFT) 50 MG tablet Take 50 mg by mouth daily   Yes Historical Provider, MD   cloNIDine (CATAPRES) 0.2 MG tablet Take 1 tablet by mouth 2 times daily 18  Yes Zeinab July, DO   Ergocalciferol (VITAMIN D2) 2000 units TABS Take 2,000 Units by mouth Daily with lunch    Yes Historical Provider, MD   hydrOXYzine (ATARAX) 25 MG tablet Take 25 mg by mouth nightly   Yes Historical Provider, MD   magnesium oxide (MAG-OX) 400 (240 Mg) MG tablet Take 1 tablet by mouth 2 times daily 18  Yes Zeinab July, DO   famotidine (PEPCID) 20 MG tablet Take 1 tablet by mouth 2 times daily 18  Yes Zeinab July, DO   glipiZIDE (GLUCOTROL) 10 MG tablet Take 10 mg by mouth 2 times daily (before meals)   Yes Historical Provider, MD   rosuvastatin (CRESTOR) 40 MG tablet Take 40 mg by mouth nightly    Yes Historical Provider, MD   fenofibrate (TRICOR) 145 MG tablet Take 145 mg by mouth daily   Yes Historical Provider, MD       Current medications:    Current Facility-Administered Medications   Medication Dose Route Frequency Provider Last Rate Last Admin    sodium chloride flush 0.9 % injection 10 mL  10 mL Intravenous 2 times per day Lonzell Dorota, DPM        sodium chloride flush 0.9 % injection 10 mL  10 mL Intravenous PRN Lonzell [place], DPM        aspirin EC tablet 81 mg  81 mg Oral Daily Luis Carlos Alberto DO        cloNIDine (CATAPRES) tablet 0.2 mg  0.2 mg Oral BID Luis Carlos Alberto DO   0.2 mg at 03/24/21 2036    Vitamin D (CHOLECALCIFEROL) tablet 2,000 Units  2,000 Units Oral Lunch Devaughn Reed DO        famotidine (PEPCID) tablet 20 mg  20 mg Oral BID Luis Carlos Alberto DO   20 mg at 03/24/21 2036    fenofibrate (TRIGLIDE) tablet 160 mg  160 mg Oral Daily Luis Carlos Alberto DO        glipiZIDE (GLUCOTROL) tablet 10 mg  10 mg Oral BID AC Luis Carlos Alberto DO   10 mg at 03/25/21 0615    hydrOXYzine (ATARAX) tablet 25 mg  25 mg Oral Nightly Luis Carlos Alberto DO   25 mg at 03/24/21 2036    insulin glargine (LANTUS) injection vial 15 Units  15 Units Subcutaneous Daily Luis Carlos Alberto DO        magnesium oxide (MAG-OX) tablet 400 mg  400 mg Oral Daily Luis Carlos Alberto DO        metFORMIN (GLUCOPHAGE) tablet 500 mg  500 mg Oral BID WC Luis Carlos Alberto DO        rosuvastatin (CRESTOR) tablet 40 mg  40 mg Oral Nightly Luis Carlos Alberto DO   40 mg at 03/24/21 2036    sertraline (ZOLOFT) tablet 50 mg  50 mg Oral Daily Luis Carlos Alberto DO        alogliptin (NESINA) tablet 12.5 mg  12.5 mg Oral Daily Luis Carlos Alberto DO           Allergies:  No Known Allergies    Problem List:    Patient Active Problem List   Diagnosis Code    Rhabdomyolysis M62.82    Delirium due to another medical condition F05    Diabetic foot infection ([facility] E11.628, L08.9    Osteomyelitis of great toe of left foot ([facility] M86.9       Past Medical History:        Diagnosis Date    Dementia ([facility]     Depression     Diabetes mellitus ([facility]     Hypertension     Neuropathy        Past Surgical History:        Procedure Laterality Date    BACK SURGERY         Social History:    Social History     Tobacco Use    Smoking status: Current Some Day Smoker     Packs/day: 0.25    Smokeless tobacco: Never Used   Substance Use Topics    Alcohol use: No                                Ready to quit: Not Answered  Counseling given: Not Answered      Vital Signs (Current):   Vitals:    03/24/21 1720 03/24/21 1815 03/24/21 2036 03/25/21 0045   BP: (!) 144/79 (!) 155/85 137/65    Pulse: 85 70 69    Resp: 16 18 16    Temp: 36.4 °C (97.6 °F) 36.5 °C (97.7 °F) 36.6 °C (97.9 °F)    TempSrc: Oral Oral Oral    SpO2: 96% 96% 96%    Weight:    175 lb (79.4 kg)   Height:                                                  BP Readings from Last 3 Encounters:   03/24/21 137/65   09/07/19 119/89   07/06/18 (!) 160/95       NPO Status:  Patient instructed to have nothing to eat or drink per instructions starting at 10AM                                                                               BMI:   Wt Readings from Last 3 Encounters:   03/25/21 175 lb (79.4 kg)   09/07/19 171 lb (77.6 kg)   07/06/18 162 lb 8 oz (73.7 kg)     Body mass index is 27.41 kg/m².     CBC:   Lab Results   Component Value Date    WBC 6.9 03/25/2021    RBC 4.50 03/25/2021    HGB 12.8 03/25/2021    HCT 38.1 03/25/2021    MCV 84.7 03/25/2021    RDW 14.2 03/25/2021     03/25/2021       CMP:   Lab Results   Component Value Date     03/25/2021    K 4.3 03/25/2021    K 4.2 03/24/2021    CL 99 03/25/2021    CO2 24 03/25/2021    BUN 22 03/25/2021    CREATININE 1.2 03/25/2021    GFRAA >60 03/25/2021    LABGLOM >60 03/25/2021    GLUCOSE 285 03/25/2021    PROT 6.5 03/25/2021    CALCIUM 8.8 03/25/2021    BILITOT 0.2 03/25/2021    ALKPHOS 59 03/25/2021    AST 27 03/25/2021    ALT 15 03/25/2021       POC Tests: No results for input(s): POCGLU, POCNA, POCK, POCCL, POCBUN, POCHEMO, POCHCT in the last 72 hours. Coags:   Lab Results   Component Value Date    PROTIME 10.8 03/25/2021    INR 1.0 03/25/2021       HCG (If Applicable): No results found for: PREGTESTUR, PREGSERUM, HCG, HCGQUANT     ABGs: No results found for: PHART, PO2ART, NEO1TDT, JUG9TQD, BEART, X2OJFKTF     Type & Screen (If Applicable):  No results found for: LABABO, LABRH    Drug/Infectious Status (If Applicable):  No results found for: HIV, HEPCAB    COVID-19 Screening (If Applicable): No results found for: COVID19    EKG 9/7/19  Normal sinus rhythm  Left anterior fascicular block  Abnormal ECG  When compared with ECG of 09-JAN-2018 10:57,  Significant changes have occurred  Confirmed by Juan Phelps [phone]) on 9/7/2019 9:52:03 AM    ECHO 1/10/18   Summary   No significant valve disease   Normal left ventricle size and systolic function. Indeterminate diastolic function. No wall motion abnormalities. Ejection fraction is visually estimated at 65%. Mild concentric left ventricular hypertrophy. Signature      ----------------------------------------------------------------   Electronically signed by Jaimie Lopez MD(Interpreting   physician) on 01/10/2018 10:13 AM    MRI left foot 3/24/21  Findings compatible with acute osteomyelitis involving the entire 1st distal   phalanx.       No definite soft tissue abscess.      CXR 9/7/19  No acute cardiopulmonary abnormality      Anesthesia Evaluation  Patient summary reviewed and Nursing notes reviewed no history of anesthetic complications:   Airway: Mallampati: II  TM distance: <3 FB   Neck ROM: full  Mouth opening: > = 3 FB Dental:    (+) edentulous, upper dentures and lower dentures      Pulmonary:   (+) decreased breath sounds,

## 2021-03-25 NOTE — PROGRESS NOTES
[address] [facility] Dr. Shanice Clinton regarding MRI results. Requests that pt be NPO after 1000. See new orders.

## 2021-03-25 NOTE — H&P
[address] [facility]                              HISTORY AND PHYSICAL    PATIENT NAME: Zeus Patel                   :        1950  MED REC NO:   [id number]                            ROOM:       9622  ACCOUNT NO:   [de-identified]                           ADMIT DATE: 2021  PROVIDER:     Brittany Wood DO    HISTORY OF PRESENT ILLNESS:  This is a 68-year-old white male well known  to me with longstanding history of diabetes mellitus. Also has painful  onychomycoses, has regular visits with his podiatrist for trimmings. Apparently has toenails trimmed and later developed some purulence and  some pus on the medial tip of his left great toe. Followed up with  podiatrist, who expressed a lot of pus and felt it there was some  tunneling down into the bone and was suspicious of osteomyelitis and  asked if I would admit him for that with Infectious Disease consult and  that is exactly what we did. He came into the Emergency Department, had  an x-ray of his foot that was negative except for degenerative joint  disease of the right metatarsophalangeal joint. We will need further  studies to confirm osteomyelitis, but we will bring in Infectious  Disease, take the appropriate cultures, and do an MRI. Further orders  will be written for him as his clinical course dictates. RECENT AND CURRENT MEDICATIONS:  Included Tricor 145 daily, Crestor 40  daily, Glucotrol 10 b.i.d., Pepcid 20 b.i.d., mag oxide 400 b.i.d.,  Atarax for sleep, ergocalciferol 2000 units daily, Catapres 0.2 b.i.d.,  Zoloft 50 daily, and Glucophage 500 b.i.d. PAST MEDICAL HISTORY:  Significant for diabetes mellitus type 2,  hyperlipidemia, hypertriglyceridemia, he has also had a history due to  delirium, and rhabdomyolysis. ALLERGIES:  He has no known allergies. PAST SURGICAL HISTORY:  Consistent with back surgery.     SOCIAL HISTORY: The patient is a current everyday smoker. Denies the  use of narcotic drugs or alcoholic beverages. FAMILY MEDICAL HISTORY:  Was noted and discussed. REVIEW OF SYSTEMS:  Negative for vertigo, cephalgia, ringing in the  ears, hearing loss, difficulty swallowing, hoarseness in his voice, or  bloody noses. He has no chest pain, palpitations, orthopnea, or  paroxysmal nocturnal dyspnea. There is no cough, wheeze, shortness of  breath, hemoptysis, or pleuritic pain. There is no nausea, vomiting,  diarrhea, constipation, blood in the stool, or recent change in weight. There is no urgency, frequency, dysuria, or hematuria. The endocrine  system is positive for diabetes and negative for thyroid disorder. Musculoskeletal system positive for degenerate joint disease. Psychiatric system negative for anxiety or depression. Neurologic  system negative for CVA, seizures, tremors, tics, or TIAs. Skin is  without rash, eruptions, urticaria, pruritus, or lesions. PHYSICAL EXAMINATION:  GENERAL:  Exam reveals this to be a 72-year-old white male in moderate  distress with the above complaints. HEENT:  Head, eyes, ears, nose, and throat examination shows the head to  be normocephalic and atraumatic. The pupils are equal and reactive to  light and accommodation. Extraocular eye muscles are intact. Tympanic  membranes are clear. Ear canals are patent. Oral mucosa pink and  moist.  NECK:  Veins are flat and nondistended. No carotid bruits. CHEST:  Symmetrical.  HEART:  Had a regular rate and rhythm without murmur, gallops, or  friction rubs. LUNGS:  Clear to auscultation bilaterally without rhonchi, rales, or  wheezes. ABDOMEN:  Soft, nontender, and nondistended. Bowel sounds are present  in all four quadrants. EXTERNAL GENITALIA:  Intact. MUSCULOSKELETAL:  Peripheral pulses are okay. Left foot shows a recent  trimming of his right great toe, possibly ingrown toenail; purulence,  pink, red, and swollen. No pus at this point. ASSESSMENT AND PLAN:  He is brought in for cultures, Infectious Disease  consult, Podiatric consult, and possibility of bone biopsy or long-term  antibiotics. At the time of admission, his condition is fair. His  long-term prognosis is guarded.         Shannan Romero DO    D: 03/25/2021 11:29:21       T: 03/25/2021 11:37:25     [email or web address]  Job#: [id number]     Doc#: [id number]    CC:

## 2021-03-25 NOTE — PROGRESS NOTES
MRI results are consistent with acute osteomyelitis of distal phalanx, left great toe. Plans are confirmed for bone biopsy and culture, distal phalanx, left great toe. Surgery has been requested for tonight at 6:30 pm.    If bone biopsy and culture confirms acute osteomyelitis, I will recommend to patient a partial amputation of left great toe, at the level of the interphalangeal joint.

## 2021-03-25 NOTE — CARE COORDINATION
Introduced my self and provided explanation of CM role to patient. Patient is awake, alert, and aware of current diagnosis and treatment plan including OR with podiatry later today at 6 PM.  He is currently npo. Patient voices he resides at home alone, adding he completes his adl's with independence. He has a walker and cane from previous health issue but does not actively use them. He has a glucometer at home. Patient is established with a pcp and denies any issue with retail pharmaceutical coverage. He receives meals from [facility] delivery service weekly (5 meals each delivery). He adds his son could visit daily if needed. Discussed potential need of home going iv atb with patient. He understands and voices he had to do that some 35 yrs ago. He would also like to consider using [facility] infusion center. He is on no antibiotics now so benefit check cannot be completed. Will follow along with  and assist with discharge planning as necessary. Nonda Night.  Joselito, MSN, RN  [facility] Case Management  [phone]

## 2021-03-25 NOTE — CONSULTS
[address] [place] [address] [facility]  Consultation Note     Admit Date: 3/24/2021 12:43 PM    Reason for Consult:   Osteomyelitis    Attending Physician:  Larry Hollingsworth DO    HISTORY OF PRESENT ILLNESS:             The history is obtained from extensive review of available past medical records. The patient is a 78 y.o. male who was sent to the ED on 3/24/2021 by his podiatrist.  He had an ingrown toenail on his left first toe and had part of the nail removed. During the procedure he was found to have an infection and there was some purulent discharge on the medial nail border. The patient was admitted for a deep tissue culture and bone biopsy. Past Medical History:        Diagnosis Date    Dementia ([facility]     Depression     Diabetes mellitus ([facility]     Hypertension     Neuropathy      Past Surgical History:        Procedure Laterality Date    BACK SURGERY       Current Medications:   Scheduled Meds:   sodium chloride flush  10 mL Intravenous 2 times per day    aspirin  81 mg Oral Daily    cloNIDine  0.2 mg Oral BID    Vitamin D  2,000 Units Oral Lunch    famotidine  20 mg Oral BID    fenofibrate  160 mg Oral Daily    glipiZIDE  10 mg Oral BID AC    hydrOXYzine  25 mg Oral Nightly    insulin glargine  15 Units Subcutaneous Daily    magnesium oxide  400 mg Oral Daily    metFORMIN  500 mg Oral BID WC    rosuvastatin  40 mg Oral Nightly    sertraline  50 mg Oral Daily    alogliptin  12.5 mg Oral Daily     Continuous Infusions:  PRN Meds:sodium chloride flush    Allergies:  Patient has no known allergies.     Social History:   Social History     Socioeconomic History    Marital status: Legally      Spouse name: None    Number of children: None    Years of education: None    Highest education level: None   Occupational History    None   Social Needs    Financial resource strain: None    Food insecurity     Worry: None     Inability: None    Transportation needs Medical: None     Non-medical: None   Tobacco Use    Smoking status: Current Some Day Smoker     Packs/day: 0.25    Smokeless tobacco: Never Used   Substance and Sexual Activity    Alcohol use: No    Drug use: No    Sexual activity: None   Lifestyle    Physical activity     Days per week: None     Minutes per session: None    Stress: None   Relationships    Social connections     Talks on phone: None     Gets together: None     Attends Druze service: None     Active member of club or organization: None     Attends meetings of clubs or organizations: None     Relationship status: None    Intimate partner violence     Fear of current or ex partner: None     Emotionally abused: None     Physically abused: None     Forced sexual activity: None   Other Topics Concern    None   Social History Narrative    None      Pets: None  Travel: None  The patient worked as a . He plays drums for a band    Family History:   History reviewed. No pertinent family history. . Otherwise non-pertinent to the chief complaint. REVIEW OF SYSTEMS:    Constitutional: Negative for fevers, chills, diaphoresis  Neurologic: Negative   Psychiatric: Negative  Rheumatologic: Negative   Endocrine: Negative  Hematologic: Negative  Immunologic: Negative  ENT: Negative  Respiratory: Negative   Cardiovascular: Negative  GI: Negative  : Negative  Musculoskeletal: As in the HPI  Skin: No rashes. PHYSICAL EXAM:    Vitals:   /65   Pulse 69   Temp 97.9 °F (36.6 °C) (Oral)   Resp 16   Ht 5' 7\" (1.702 m)   Wt 175 lb (79.4 kg)   SpO2 96%   BMI 27.41 kg/m²   Constitutional: The patient is awake, alert, and oriented. No distress. Skin: Warm and dry. No rashes were noted. HEENT: Eyes show round, and reactive pupils. No jaundice. Moist mucous membranes, no ulcerations, no thrush. Neck: Supple to movements. No lymphadenopathy. Chest: No use of accessory muscles to breathe. Symmetrical expansion.  Auscultation reveals no wheezing, crackles, or rhonchi. Cardiovascular: S1 and S2 are rhythmic and regular. No murmurs appreciated. Abdomen: Positive bowel sounds to auscultation. Benign to palpation. No masses felt. No hepatosplenomegaly. Extremities: The left hallux is thickened and slightly erythematous. Partial resection of the medial aspect of the nail noted. No purulent drainage.   Lines: peripheral      CBC+dif:  Recent Labs     03/24/21  1337 03/25/21  0450   WBC 6.2 6.9   HGB 13.1 12.8   HCT 39.0 38.1   MCV 84.4 84.7    219   NEUTROABS 3.94  --      Lab Results   Component Value Date    CRP 1.4 (H) 03/24/2021      No results found for: CRPHS  Lab Results   Component Value Date    SEDRATE 22 (H) 03/24/2021    SEDRATE 11 01/10/2018     Lab Results   Component Value Date    ALT 15 03/25/2021    AST 27 03/25/2021    ALKPHOS 59 03/25/2021    BILITOT 0.2 03/25/2021     Lab Results   Component Value Date     03/25/2021    K 4.3 03/25/2021    K 4.2 03/24/2021    CL 99 03/25/2021    CO2 24 03/25/2021    BUN 22 03/25/2021    CREATININE 1.2 03/25/2021    GFRAA >60 03/25/2021    LABGLOM >60 03/25/2021    GLUCOSE 285 03/25/2021    PROT 6.5 03/25/2021    LABALBU 3.8 03/25/2021    CALCIUM 8.8 03/25/2021    BILITOT 0.2 03/25/2021    ALKPHOS 59 03/25/2021    AST 27 03/25/2021    ALT 15 03/25/2021       Lab Results   Component Value Date    PROTIME 10.8 03/25/2021    INR 1.0 03/25/2021       Lab Results   Component Value Date    TSH 0.686 01/10/2018       Lab Results   Component Value Date    COLORU Yellow 09/07/2019    PHUR 5.5 09/07/2019    WBCUA 1-3 01/09/2018    RBCUA 2-5 01/09/2018    BACTERIA MANY 01/09/2018    CLARITYU Clear 09/07/2019    SPECGRAV 1.010 09/07/2019    LEUKOCYTESUR Negative 09/07/2019    UROBILINOGEN 0.2 09/07/2019    BILIRUBINUR Negative 09/07/2019    BLOODU Negative 09/07/2019    GLUCOSEU >=1000 09/07/2019       No results found for: HCO3, BE, O2SAT, PH, THGB, PCO2, PO2, TCO2  Radiology:  Noted    Microbiology:  Pending  No results for input(s): BC in the last 72 hours. No results for input(s): ORG in the last 72 hours. No results for input(s): Joselyn Blazing in the last 72 hours. No results for input(s): STREPNEUMAGU in the last 72 hours. No results for input(s): LP1UAG in the last 72 hours. No results for input(s): ASO in the last 72 hours. No results for input(s): CULTRESP in the last 72 hours. No results for input(s): PROCAL in the last 72 hours. Assessment:  · Probable chronic osteomyelitis of the left hallux  · Probable paronychia    Plan:    · Patient going to the OR today  · After the OR we will start Cefazolin  · The patient will most likely require a PICC  · Will follow with you    Thank you for having us see this patient in consultation. I will be discussing this case with the treating physicians.     Jeevan Bhandari  7:46 AM  3/25/2021

## 2021-03-25 NOTE — PATIENT CARE CONFERENCE
P Quality Flow/Interdisciplinary Rounds Progress Note        Quality Flow Rounds held on March 25, 2021    Disciplines Attending:  Bedside Nurse, ,  and Nursing Unit 136 Barbara Jasmine was admitted on 3/24/2021 12:43 PM    Anticipated Discharge Date:  Expected Discharge Date: 03/26/21    Disposition:    Diogenes Score:  Diogenes Scale Score: 20    Readmission Risk              Risk of Unplanned Readmission:        8           Discussed patient goal for the day, patient clinical progression, and barriers to discharge.   The following Goal(s) of the Day/Commitment(s) have been identified:  Labs - Report Results       Julio Servin  March 25, 2021

## 2021-03-26 LAB
METER GLUCOSE: 243 MG/DL (ref 74–99)
WOUND/ABSCESS: NORMAL

## 2021-03-26 PROCEDURE — 6370000000 HC RX 637 (ALT 250 FOR IP): Performed by: GENERAL PRACTICE

## 2021-03-26 PROCEDURE — 6370000000 HC RX 637 (ALT 250 FOR IP): Performed by: REGISTERED NURSE

## 2021-03-26 PROCEDURE — 6360000002 HC RX W HCPCS: Performed by: SPECIALIST

## 2021-03-26 PROCEDURE — 82962 GLUCOSE BLOOD TEST: CPT

## 2021-03-26 PROCEDURE — 1200000000 HC SEMI PRIVATE

## 2021-03-26 RX ORDER — METRONIDAZOLE 500 MG/1
500 TABLET ORAL EVERY 8 HOURS SCHEDULED
Status: DISCONTINUED | OUTPATIENT
Start: 2021-03-26 | End: 2021-03-28

## 2021-03-26 RX ORDER — HYDROCODONE BITARTRATE AND ACETAMINOPHEN 5; 325 MG/1; MG/1
1 TABLET ORAL EVERY 6 HOURS PRN
Status: DISCONTINUED | OUTPATIENT
Start: 2021-03-26 | End: 2021-03-29 | Stop reason: HOSPADM

## 2021-03-26 RX ORDER — HYDROXYZINE PAMOATE 25 MG/1
25 CAPSULE ORAL NIGHTLY
Status: DISCONTINUED | OUTPATIENT
Start: 2021-03-26 | End: 2021-03-29 | Stop reason: HOSPADM

## 2021-03-26 RX ORDER — ACETAMINOPHEN 325 MG/1
650 TABLET ORAL EVERY 4 HOURS PRN
Status: DISCONTINUED | OUTPATIENT
Start: 2021-03-26 | End: 2021-03-29 | Stop reason: HOSPADM

## 2021-03-26 RX ADMIN — METFORMIN HYDROCHLORIDE 500 MG: 500 TABLET ORAL at 08:14

## 2021-03-26 RX ADMIN — CLONIDINE HYDROCHLORIDE 0.2 MG: 0.2 TABLET ORAL at 08:14

## 2021-03-26 RX ADMIN — ROSUVASTATIN CALCIUM 40 MG: 20 TABLET, FILM COATED ORAL at 22:16

## 2021-03-26 RX ADMIN — METRONIDAZOLE 500 MG: 500 TABLET, FILM COATED ORAL at 22:15

## 2021-03-26 RX ADMIN — METFORMIN HYDROCHLORIDE 500 MG: 500 TABLET ORAL at 18:59

## 2021-03-26 RX ADMIN — GLIPIZIDE 10 MG: 5 TABLET ORAL at 06:18

## 2021-03-26 RX ADMIN — INSULIN GLARGINE 15 UNITS: 100 INJECTION, SOLUTION SUBCUTANEOUS at 08:13

## 2021-03-26 RX ADMIN — ASPIRIN 81 MG: 81 TABLET, COATED ORAL at 08:14

## 2021-03-26 RX ADMIN — SERTRALINE HYDROCHLORIDE 50 MG: 50 TABLET ORAL at 08:14

## 2021-03-26 RX ADMIN — GLIPIZIDE 10 MG: 5 TABLET ORAL at 18:59

## 2021-03-26 RX ADMIN — HYDROXYZINE PAMOATE 25 MG: 25 CAPSULE ORAL at 22:15

## 2021-03-26 RX ADMIN — ALOGLIPTIN 12.5 MG: 12.5 TABLET, FILM COATED ORAL at 08:14

## 2021-03-26 RX ADMIN — Medication 2000 MG: at 06:19

## 2021-03-26 RX ADMIN — Medication 2000 UNITS: at 11:36

## 2021-03-26 RX ADMIN — HYDROCODONE BITARTRATE AND ACETAMINOPHEN 1 TABLET: 5; 325 TABLET ORAL at 08:14

## 2021-03-26 RX ADMIN — Medication 400 MG: at 08:13

## 2021-03-26 RX ADMIN — Medication 2000 MG: at 14:22

## 2021-03-26 RX ADMIN — FAMOTIDINE 20 MG: 20 TABLET ORAL at 22:16

## 2021-03-26 RX ADMIN — Medication 2000 MG: at 22:15

## 2021-03-26 RX ADMIN — FAMOTIDINE 20 MG: 20 TABLET ORAL at 08:14

## 2021-03-26 RX ADMIN — CLONIDINE HYDROCHLORIDE 0.2 MG: 0.2 TABLET ORAL at 22:15

## 2021-03-26 RX ADMIN — ACETAMINOPHEN 650 MG: 325 TABLET ORAL at 11:39

## 2021-03-26 RX ADMIN — METRONIDAZOLE 500 MG: 500 TABLET, FILM COATED ORAL at 14:23

## 2021-03-26 RX ADMIN — FENOFIBRATE 160 MG: 160 TABLET ORAL at 08:13

## 2021-03-26 ASSESSMENT — PAIN SCALES - GENERAL
PAINLEVEL_OUTOF10: 6
PAINLEVEL_OUTOF10: 8

## 2021-03-26 NOTE — PROGRESS NOTES
of surgery. Our discussion included the post-op course, during which activities will be limited. Patient inquired whether it will affect his walking. It will affect walking to some degree. Patient will not be able to push down on the left great toe with as much force after the distal phalanx is removed - patient will lose the activity of the flexor hallucis longus. The potential benefits outweigh the risks of surgery. Without surgery, bone infection would likely persist.  Potential risks include but are not limited to infection, difficulty healing, altered gait, foot deformity, ulcer formation in new areas of the foot. Patient is agreeable with the plan for surgery.

## 2021-03-26 NOTE — CARE COORDINATION
Met with patient at bedside. Again he voices his preference to use [facility] if IV atb are ordered daily post discharge. If however regimen is a multi-dose per day order, he is agreeable to [facility]. Choices offered and patient selected [facility]. Called and spoke with Félix at [facility]. Referral initiated. Agency is out of network. Referral called to Ric Alston with [facility] who will complete benefit check and availability to open early next week. Discussed with Dr. Tay Lopez - he anticipates patient here thru weekend and will keep his preferences in mind when ordering atb. Will follow along with  and assist with discharge planning as necessary. Per Ric Alston at [facility] cost for atb as currently ordered - $1.30 per week plus $0.80 oer day for flushes. She will follow up on Monday for final atb orders. Shashank Urban.  Joselito, MSN, RN  [facility] Case Management  [phone]

## 2021-03-26 NOTE — OP NOTE
Operative Note      Patient: Cookie Kevin  YOB: 1950  MRN: [id number]    Date of Procedure: 3/25/2021    Pre-Op Diagnosis: Osteomyelitis, left great toe. Post-Op Diagnosis: Same       Procedure(s):  BONE BIOPSY AND CULTURE, GREAT TOE LEFT FOOT    Surgeon(s):  Марина Young DPM    Assistant:   Resident: Rudy Padilla DPM    Anesthesia: Monitor Anesthesia Care    Estimated Blood Loss (mL): Minimal    Complications: None    Specimens:   ID Type Source Tests Collected by Time Destination   1 :  Bone Bone CULTURE, ANAEROBIC, CULTURE, FUNGUS, CULTURE, SURGICAL, CULTURE WITH SMEAR, ACID FAST Miryam Joseph DPM 3/25/2021 2057    A : Bone left great toe Bone Bone SURGICAL PATHOLOGY Марина Young DPM 3/25/2021 2058        Implants:  * No implants in log *      Drains: * No LDAs found *    Findings: Bone and purulence. Detailed Description of Procedure: The patient was brought to the operating room and placed on the operating table in the supine position. Local anesthesia was administered to the left great toe with 2.5 cc of 0.5% Marcaine. The left foot was then prepared and draped in the usual aseptic manner. Anesthesia was administered as stated in the anesthesia record. A #15 blade was used to make a stab incision at the distal tip of the left great toe. A sterile elevator was used to probe to the underlying bone. Using fluoroscopic guidance, a Jamshidi needle was then inserted into the distal phalanx of the left great toe. A core of bone with obvious purulence was then removed from the distal phalanx and sent for culture as follows: Aerobic, anaerobic, acid-fast, and fungal.    There was not enough bone from the first sample to send to pathology. A #15 blade was used to make a stab incision at the medial plantar aspect of the left great toe. A sterile elevator was then used to probe to bone.   Using fluoroscopic guidance, a Jamshidi needle was inserted into the distal phalanx of the left great toe. A core of bone was removed and sent to pathology. A minimal amount of bleeding was present at each surgical site. The surgical sites were flushed with sterile saline. Each stab incision was then closed with a simple interrupted suture of 4-0 nylon. The surgical sites on the left great toe were then dressed with sterile Adaptic, 4 x 4 gauze, and Renetta. Patient tolerated the surgical procedure well and left the operating room in good condition with vital signs stable.     Electronically signed by Iram Aguilar DPM on 3/25/2021 at 9:38 PM

## 2021-03-26 NOTE — PROGRESS NOTES
[address] [place] [address] [facility]  Progress Note    SUBJECTIVE:  Chief Complaint   Patient presents with    Wound Check     sent in by pediatry for ingrown toe nail,      Patient is tolerating medications. No fevers, chills. No N/V/D. S/p bone biopsy & culture of left great toe last night     Review of systems:  As stated above in the chief complaint, otherwise negative. Medications:  Scheduled Meds:   ceFAZolin  2,000 mg Intravenous Q8H    aspirin  81 mg Oral Daily    cloNIDine  0.2 mg Oral BID    Vitamin D  2,000 Units Oral Lunch    famotidine  20 mg Oral BID    fenofibrate  160 mg Oral Daily    glipiZIDE  10 mg Oral BID AC    hydrOXYzine  25 mg Oral Nightly    insulin glargine  15 Units Subcutaneous Daily    magnesium oxide  400 mg Oral Daily    metFORMIN  500 mg Oral BID WC    rosuvastatin  40 mg Oral Nightly    sertraline  50 mg Oral Daily    alogliptin  12.5 mg Oral Daily     Continuous Infusions:  PRN Meds:acetaminophen, HYDROcodone 5 mg - acetaminophen    OBJECTIVE:  BP (!) 145/63   Pulse 70   Temp 97.8 °F (36.6 °C) (Oral)   Resp 18   Ht 5' 7\" (1.702 m)   Wt 176 lb 9.6 oz (80.1 kg)   SpO2 95%   BMI 27.66 kg/m²   Temp  Av.8 °F (36.6 °C)  Min: 97.4 °F (36.3 °C)  Max: 98.2 °F (36.8 °C)  Constitutional: The patient is awake, alert, and oriented. Lying in bed in no distress. Skin: Warm and dry. No rashes were noted. HEENT: Round and reactive pupils. Moist mucous membranes. No ulcerations or thrush. Neck: Supple to movements. Chest: No respiratory distress. Clear bilaterally. Cardiovascular: S1 and S2 are rhythmic and regular. No murmurs appreciated. Abdomen: Positive bowel sounds to auscultation. Benign to palpation. No masses felt. Extremities: No edema. Left great toe is wrapped in a dry dressing post op - not taken down.    Lines: peripheral    Laboratory and Tests Review:  Lab Results   Component Value Date    WBC 6.9 2021    WBC 6.2 03/24/2021    WBC 6.0 09/07/2019    HGB 12.8 03/25/2021    HCT 38.1 03/25/2021    MCV 84.7 03/25/2021     03/25/2021     Lab Results   Component Value Date    NEUTROABS 3.94 03/24/2021    NEUTROABS 4.14 09/07/2019    NEUTROABS 4.60 07/03/2018     No results found for: CRP  Lab Results   Component Value Date    ALT 15 03/25/2021    AST 27 03/25/2021    ALKPHOS 59 03/25/2021    BILITOT 0.2 03/25/2021     Lab Results   Component Value Date     03/25/2021    K 4.3 03/25/2021    K 4.2 03/24/2021    CL 99 03/25/2021    CO2 24 03/25/2021    BUN 22 03/25/2021    CREATININE 1.2 03/25/2021    CREATININE 1.3 03/24/2021    CREATININE 1.4 09/07/2019    GFRAA >60 03/25/2021    LABGLOM >60 03/25/2021    GLUCOSE 285 03/25/2021    PROT 6.5 03/25/2021    LABALBU 3.8 03/25/2021    CALCIUM 8.8 03/25/2021    BILITOT 0.2 03/25/2021    ALKPHOS 59 03/25/2021    AST 27 03/25/2021    ALT 15 03/25/2021     Lab Results   Component Value Date    CRP 1.2 (H) 03/25/2021    CRP 1.4 (H) 03/24/2021     Lab Results   Component Value Date    SEDRATE 38 (H) 03/25/2021    SEDRATE 22 (H) 03/24/2021    SEDRATE 11 01/10/2018     Radiology:  Noted     Microbiology:   Blood cultures 3/24/2021 - negative so far  Wound cutlure 3/24/21 - no growth so far  Surgical cultures 3/25/2021 - pending  Anerobic wound culture 3/24/2021 - growth present, evaluating for anerobes. ASSESSMENT:  · Probable chronic osteomyelitis of the left hallux  · Probable paronychia  · S/p bone biopsy & culture of left great toe 3/25/2021     PLAN:  · Continue Cefazolin   · Add Metronidazole for anaerobes    · Will likely need PICC   · Follow surgical cultures   · Check final cultures  · Monitor labs    Chestdaryn Cavazos  12:14 PM  3/26/2021     Patient seen and examined. I had a face to face encounter with the patient. Agree with exam.  Assessment and plan as outlined above and directed by me. Addition and corrections were done as deemed appropriate.  My exam and plan include: The patient tolerated surgery well. He is doing well with antibiotics. Thus far, anaerobes were recovered from the wound culture. OR cultures are pending. Continue Cefazolin. Add Metronidazole.     Brittany Comment  3/26/2021  1:45 PM

## 2021-03-26 NOTE — ANESTHESIA POSTPROCEDURE EVALUATION
Department of Anesthesiology  Postprocedure Note    Patient: Nadine Zayas  MRN: [id number]  [place]: 1950  Date of evaluation: 3/25/2021  Time:  9:35 PM     Procedure Summary     Date: 03/25/21 Room / Location: [facility] 01 / [facility]    Anesthesia Start: 2037 Anesthesia Stop: 2127    Procedure: BONE BIOPSY AND CULTURE, GREAT TOE LEFT FOOT (Left Foot) Diagnosis:       Osteomyelitis of great toe of left foot ([address]      (Osteomyelitis, left great toe.)    Surgeons: Ruddy Enriquez DPM Responsible Provider: Analisa Webb MD    Anesthesia Type: MAC ASA Status: 3          Anesthesia Type: MAC    Aura Phase I:      Aura Phase II: Aura Score: 10    Last vitals: Reviewed and per EMR flowsheets.        Anesthesia Post Evaluation    Patient location during evaluation: PACU  Patient participation: complete - patient participated  Level of consciousness: awake and alert  Airway patency: patent  Nausea & Vomiting: no nausea and no vomiting  Complications: no  Cardiovascular status: hemodynamically stable  Respiratory status: acceptable  Hydration status: euvolemic

## 2021-03-26 NOTE — PROGRESS NOTES
P Quality Flow/Interdisciplinary Rounds Progress Note        Quality Flow Rounds held on March 26, 2021    Disciplines Attending:  Bedside Nurse, ,  and Nursing Unit 136 Barbara Jasmine was admitted on 3/24/2021 12:43 PM    Anticipated Discharge Date:  Expected Discharge Date: 03/26/21    Disposition:    Diogenes Score:  Diogenes Scale Score: 21    Readmission Risk              Risk of Unplanned Readmission:        8           Discussed patient goal for the day, patient clinical progression, and barriers to discharge.   The following Goal(s) of the Day/Commitment(s) have been identified:  Diagnostics - Report Results and Labs - Report Results      Vassie Pickup  March 26, 2021

## 2021-03-27 ENCOUNTER — ANESTHESIA EVENT (OUTPATIENT)
Dept: OPERATING ROOM | Age: 71
DRG: 617 | End: 2021-03-27
Payer: MEDICARE

## 2021-03-27 LAB
METER GLUCOSE: 257 MG/DL (ref 74–99)
METER GLUCOSE: 294 MG/DL (ref 74–99)

## 2021-03-27 PROCEDURE — 6370000000 HC RX 637 (ALT 250 FOR IP): Performed by: GENERAL PRACTICE

## 2021-03-27 PROCEDURE — 6360000002 HC RX W HCPCS: Performed by: SPECIALIST

## 2021-03-27 PROCEDURE — 82962 GLUCOSE BLOOD TEST: CPT

## 2021-03-27 PROCEDURE — 2580000003 HC RX 258: Performed by: PODIATRIST

## 2021-03-27 PROCEDURE — 6370000000 HC RX 637 (ALT 250 FOR IP): Performed by: REGISTERED NURSE

## 2021-03-27 PROCEDURE — 1200000000 HC SEMI PRIVATE

## 2021-03-27 RX ORDER — SODIUM CHLORIDE 0.9 % (FLUSH) 0.9 %
10 SYRINGE (ML) INJECTION EVERY 12 HOURS SCHEDULED
Status: DISCONTINUED | OUTPATIENT
Start: 2021-03-27 | End: 2021-03-28 | Stop reason: HOSPADM

## 2021-03-27 RX ORDER — SODIUM CHLORIDE 0.9 % (FLUSH) 0.9 %
10 SYRINGE (ML) INJECTION PRN
Status: DISCONTINUED | OUTPATIENT
Start: 2021-03-27 | End: 2021-03-28 | Stop reason: HOSPADM

## 2021-03-27 RX ADMIN — INSULIN GLARGINE 15 UNITS: 100 INJECTION, SOLUTION SUBCUTANEOUS at 08:50

## 2021-03-27 RX ADMIN — METFORMIN HYDROCHLORIDE 500 MG: 500 TABLET ORAL at 08:44

## 2021-03-27 RX ADMIN — HYDROXYZINE PAMOATE 25 MG: 25 CAPSULE ORAL at 19:43

## 2021-03-27 RX ADMIN — Medication 2000 MG: at 14:22

## 2021-03-27 RX ADMIN — ROSUVASTATIN CALCIUM 40 MG: 20 TABLET, FILM COATED ORAL at 19:43

## 2021-03-27 RX ADMIN — Medication 400 MG: at 08:44

## 2021-03-27 RX ADMIN — METRONIDAZOLE 500 MG: 500 TABLET, FILM COATED ORAL at 06:38

## 2021-03-27 RX ADMIN — METRONIDAZOLE 500 MG: 500 TABLET, FILM COATED ORAL at 14:22

## 2021-03-27 RX ADMIN — Medication 2000 UNITS: at 11:40

## 2021-03-27 RX ADMIN — SERTRALINE HYDROCHLORIDE 50 MG: 50 TABLET ORAL at 08:44

## 2021-03-27 RX ADMIN — CLONIDINE HYDROCHLORIDE 0.2 MG: 0.2 TABLET ORAL at 08:44

## 2021-03-27 RX ADMIN — FENOFIBRATE 160 MG: 160 TABLET ORAL at 08:44

## 2021-03-27 RX ADMIN — ALOGLIPTIN 12.5 MG: 12.5 TABLET, FILM COATED ORAL at 08:44

## 2021-03-27 RX ADMIN — METRONIDAZOLE 500 MG: 500 TABLET, FILM COATED ORAL at 22:00

## 2021-03-27 RX ADMIN — CLONIDINE HYDROCHLORIDE 0.2 MG: 0.2 TABLET ORAL at 20:30

## 2021-03-27 RX ADMIN — FAMOTIDINE 20 MG: 20 TABLET ORAL at 20:30

## 2021-03-27 RX ADMIN — Medication 10 ML: at 22:00

## 2021-03-27 RX ADMIN — FAMOTIDINE 20 MG: 20 TABLET ORAL at 08:44

## 2021-03-27 RX ADMIN — ASPIRIN 81 MG: 81 TABLET, COATED ORAL at 08:44

## 2021-03-27 RX ADMIN — METFORMIN HYDROCHLORIDE 500 MG: 500 TABLET ORAL at 16:51

## 2021-03-27 RX ADMIN — GLIPIZIDE 10 MG: 5 TABLET ORAL at 06:38

## 2021-03-27 RX ADMIN — HYDROCODONE BITARTRATE AND ACETAMINOPHEN 1 TABLET: 5; 325 TABLET ORAL at 19:46

## 2021-03-27 RX ADMIN — Medication 2000 MG: at 22:00

## 2021-03-27 RX ADMIN — Medication 2000 MG: at 06:38

## 2021-03-27 RX ADMIN — GLIPIZIDE 10 MG: 5 TABLET ORAL at 16:51

## 2021-03-27 ASSESSMENT — PAIN SCALES - GENERAL
PAINLEVEL_OUTOF10: 0
PAINLEVEL_OUTOF10: 6
PAINLEVEL_OUTOF10: 0

## 2021-03-27 NOTE — PROGRESS NOTES
Admit Date: 3/24/2021    Subjective:     Patient seen at bedside. No new complaints. Medication side effects: none reported. Objective:     Vitals:    03/27/21 0800   BP: 137/65   Pulse: 67   Resp: 16   Temp: 98.1 °F (36.7 °C)   SpO2: 98%      Extremities: Bone culture from left great toe shows moderate growth of Corynebacterium. Lab Results   Component Value Date    SEDRATE 38 (H) 03/25/2021      Lab Results   Component Value Date    CRP 1.2 (H) 03/25/2021      Lab Results   Component Value Date    CREATININE 1.2 03/25/2021    BUN 22 03/25/2021     (L) 03/25/2021    K 4.3 03/25/2021    CL 99 03/25/2021    CO2 24 03/25/2021    Estimated Creatinine Clearance: 60 mL/min (based on SCr of 1.2 mg/dL). Lab Results   Component Value Date    ALT 15 03/25/2021    AST 27 03/25/2021    ALKPHOS 59 03/25/2021    BILITOT 0.2 03/25/2021         Assessment:   1.  2 day2 post-op for bone biopsy and culture, left great toe. 2.  Acute osteomyelitis, left great toe. 3.  Diabetes with peripheral neuropathy. Principal Problem:    Osteomyelitis of great toe of left foot ([address]  Active Problems:    Diabetic foot infection ([address]  Resolved Problems:    * No resolved hospital problems. *      Plan:     Bone culture from distal phalanx of left great toe is positive for moderate growth of Corynebacterium. Plan:  Partial amputation of left great toe, at the level of the distal interphalangeal joint. Surgery is scheduled for 3/28/21 (Sunday) at 11:00 am.    I reviewed the bone culture results with patient today and the rationale for surgery - to eliminate the infected bone. I explained the nature of surgery to patient in detail. Our discussion included the post-op course, during which activities will be limited for approximately 3 weeks. Bathroom privileges only for the first week after surgery.   I explained possible risks and complications such as persistent infection, difficulty healing, losing more of the left great toe, skin ulcers forming on the left great toe or in other areas on the left foot, altered gait, and foot deformity. I explained that since patient cannot feel his feet, he is at increased risk for skin ulcers on his feet. Surgery will affect patient's walking to some degree. Patient will not be able to push down on the left great toe with as much force after the distal phalanx is removed - patient will lose the activity of the flexor hallucis longus. The potential benefits outweigh the risks of surgery. Without surgery, bone infection would likely persist.     Patient is agreeable with the plan for surgery.

## 2021-03-27 NOTE — ANESTHESIA PRE PROCEDURE
Department of Anesthesiology  Preprocedure Note       Name:  Dwight Mcguire   Age:  78 y.o.  :  1950                                          MRN:  [id number]         Date:  3/27/2021      Surgeon: Lisa Sanabria):  Linda Mckeon DPM    Procedure: Procedure(s):  TOE AMPUTATION    Medications prior to admission:   Prior to Admission medications    Medication Sig Start Date End Date Taking? Authorizing Provider   insulin glargine (LANTUS) 100 UNIT/ML injection vial Inject 15 Units into the skin daily     Historical Provider, MD   diazePAM (VALIUM) 5 MG tablet Take 5 mg by mouth daily as needed for Anxiety.     Historical Provider, MD   COVID-19 mRNA Vacc, PFIZER, 30 MCG/0.3ML SUSP injection Inject 0.3 mLs into the muscle once *FIRST DOSE: 2021  SECOND DOSE:2021*    Historical Provider, MD   influenza virus trivalent vaccine (FLUZONE) injection Inject 0.5 mLs into the muscle once *GIVEN AT [facility]*    Historical Provider, MD   aspirin 81 MG EC tablet Take 81 mg by mouth daily    Historical Provider, MD   SITagliptin (JANUVIA) 50 MG tablet Take 50 mg by mouth Daily with lunch     Historical Provider, MD   metFORMIN (GLUCOPHAGE) 500 MG tablet Take 500 mg by mouth 2 times daily (with meals)    Historical Provider, MD   sertraline (ZOLOFT) 50 MG tablet Take 50 mg by mouth daily    Historical Provider, MD   cloNIDine (CATAPRES) 0.2 MG tablet Take 1 tablet by mouth 2 times daily 18   Osgage Brandie, DO   Ergocalciferol (VITAMIN D2) 2000 units TABS Take 2,000 Units by mouth Daily with lunch     Historical Provider, MD   hydrOXYzine (ATARAX) 25 MG tablet Take 25 mg by mouth nightly    Historical Provider, MD   magnesium oxide (MAG-OX) 400 (240 Mg) MG tablet Take 1 tablet by mouth 2 times daily 18   Osie Brandie, DO   famotidine (PEPCID) 20 MG tablet Take 1 tablet by mouth 2 times daily 18   Osie Brandie, DO   glipiZIDE (GLUCOTROL) 10 MG tablet Take 10 mg by mouth 2 times daily (before meals)    Historical Provider, MD   rosuvastatin (CRESTOR) 40 MG tablet Take 40 mg by mouth nightly     Historical Provider, MD   fenofibrate (TRICOR) 145 MG tablet Take 145 mg by mouth daily    Historical Provider, MD       Current medications:    No current facility-administered medications for this visit. No current outpatient medications on file.      Facility-Administered Medications Ordered in Other Visits   Medication Dose Route Frequency Provider Last Rate Last Admin    acetaminophen (TYLENOL) tablet 650 mg  650 mg Oral Q4H PRN Shahid Im, DO   650 mg at 03/26/21 1139    HYDROcodone-acetaminophen (NORCO) 5-325 MG per tablet 1 tablet  1 tablet Oral Q6H PRN Shahid Im, DO   1 tablet at 03/26/21 0814    metroNIDAZOLE (FLAGYL) tablet 500 mg  500 mg Oral 3 times per day Jam Nap, APRN - CNP   500 mg at 03/27/21 1422    hydrOXYzine (VISTARIL) capsule 25 mg  25 mg Oral Nightly Shahid Im, DO   25 mg at 03/26/21 2215    ceFAZolin (ANCEF) 2000 mg in sterile water 20 mL IV syringe  2,000 mg Intravenous Q8H Uzma Tripp MD   2,000 mg at 03/27/21 1422    aspirin EC tablet 81 mg  81 mg Oral Daily Shahid Im, DO   81 mg at 03/27/21 7924    cloNIDine (CATAPRES) tablet 0.2 mg  0.2 mg Oral BID Shahid Im, DO   0.2 mg at 03/27/21 6281    Vitamin D (CHOLECALCIFEROL) tablet 2,000 Units  2,000 Units Oral Lunch Shahid Im, DO   2,000 Units at 03/27/21 1140    famotidine (PEPCID) tablet 20 mg  20 mg Oral BID Shahid Im, DO   20 mg at 03/27/21 0844    fenofibrate (TRIGLIDE) tablet 160 mg  160 mg Oral Daily Shahid Im, DO   160 mg at 03/27/21 0844    glipiZIDE (GLUCOTROL) tablet 10 mg  10 mg Oral BID AC Shahid Im, DO   10 mg at 03/27/21 9073    insulin glargine (LANTUS) injection vial 15 Units  15 Units Subcutaneous Daily Shahid Im, DO   15 Units at 03/27/21 0850    magnesium oxide (MAG-OX) tablet 400 mg  400 mg Oral Daily Shahid Im, DO   400 mg at 03/27/21 0844    metFORMIN (GLUCOPHAGE) tablet 500 mg  500 mg Oral BID WC Rosalinda Scales, DO   500 mg at 03/27/21 3363    rosuvastatin (CRESTOR) tablet 40 mg  40 mg Oral Nightly Rosalinda Scales, DO   40 mg at 03/26/21 2216    sertraline (ZOLOFT) tablet 50 mg  50 mg Oral Daily Rosalinda Scales, DO   50 mg at 03/27/21 9862    alogliptin (NESINA) tablet 12.5 mg  12.5 mg Oral Daily Rosalinda Scales, DO   12.5 mg at 03/27/21 2850       Allergies:  No Known Allergies    Problem List:    Patient Active Problem List   Diagnosis Code    Rhabdomyolysis M62.82    Delirium due to another medical condition F05    Diabetic foot infection ([facility] E11.628, L08.9    Osteomyelitis of great toe of left foot ([facility] M86.9       Past Medical History:        Diagnosis Date    Dementia ([facility]     Depression     Diabetes mellitus ([facility]     Hypertension     Neuropathy        Past Surgical History:        Procedure Laterality Date    BACK SURGERY      FOOT DEBRIDEMENT Left 3/25/2021    BONE BIOPSY AND CULTURE, GREAT TOE LEFT FOOT performed by Katherin Barbosa DPM at [facility] OR       Social History:    Social History     Tobacco Use    Smoking status: Current Some Day Smoker     Packs/day: 0.25    Smokeless tobacco: Never Used   Substance Use Topics    Alcohol use: No                                Ready to quit: Not Answered  Counseling given: Not Answered      Vital Signs (Current): There were no vitals filed for this visit.                                            BP Readings from Last 3 Encounters:   03/27/21 137/65   03/25/21 137/76   09/07/19 119/89       NPO Status:  Patient instructed to have nothing to eat or drink per instructions starting at 10AM                                                                               BMI:   Wt Readings from Last 3 Encounters:   03/27/21 177 lb 4.8 oz (80.4 kg)   09/07/19 171 lb (77.6 kg)   07/06/18 162 lb 8 oz (73.7 kg)     There is no height or weight on file to calculate BMI.    CBC:   Lab Results Component Value Date    WBC 6.9 03/25/2021    RBC 4.50 03/25/2021    HGB 12.8 03/25/2021    HCT 38.1 03/25/2021    MCV 84.7 03/25/2021    RDW 14.2 03/25/2021     03/25/2021       CMP:   Lab Results   Component Value Date     03/25/2021    K 4.3 03/25/2021    K 4.2 03/24/2021    CL 99 03/25/2021    CO2 24 03/25/2021    BUN 22 03/25/2021    CREATININE 1.2 03/25/2021    GFRAA >60 03/25/2021    LABGLOM >60 03/25/2021    GLUCOSE 285 03/25/2021    PROT 6.5 03/25/2021    CALCIUM 8.8 03/25/2021    BILITOT 0.2 03/25/2021    ALKPHOS 59 03/25/2021    AST 27 03/25/2021    ALT 15 03/25/2021       POC Tests: No results for input(s): POCGLU, POCNA, POCK, POCCL, POCBUN, POCHEMO, POCHCT in the last 72 hours. Coags:   Lab Results   Component Value Date    PROTIME 10.8 03/25/2021    INR 1.0 03/25/2021       HCG (If Applicable): No results found for: PREGTESTUR, PREGSERUM, HCG, HCGQUANT     ABGs: No results found for: PHART, PO2ART, DIH5WZR, PUU7VRQ, BEART, [id number]     Type & Screen (If Applicable):  No results found for: LABABO, LABRH    Drug/Infectious Status (If Applicable):  No results found for: HIV, HEPCAB    COVID-19 Screening (If Applicable): No results found for: COVID19    EKG 9/7/19  Normal sinus rhythm  Left anterior fascicular block  Abnormal ECG  When compared with ECG of 09-JAN-2018 10:57,  Significant changes have occurred  Confirmed by Chaitanya Doll ([phone]) on 9/7/2019 9:52:03 AM    ECHO 1/10/18   Summary   No significant valve disease   Normal left ventricle size and systolic function. Indeterminate diastolic function. No wall motion abnormalities. Ejection fraction is visually estimated at 65%. Mild concentric left ventricular hypertrophy.       Signature      ----------------------------------------------------------------   Electronically signed by Rosmery Powell MD(Interpreting   physician) on 01/10/2018 10:13 AM    MRI left foot 3/24/21  Findings compatible with acute osteomyelitis involving the entire 1st distal   phalanx.       No definite soft tissue abscess. CXR 9/7/19  No acute cardiopulmonary abnormality      Anesthesia Evaluation  Patient summary reviewed and Nursing notes reviewed no history of anesthetic complications:   Airway: Mallampati: II  TM distance: >3 FB   Neck ROM: full  Mouth opening: > = 3 FB Dental:    (+) edentulous, upper dentures and lower dentures      Pulmonary: breath sounds clear to auscultation  (+) current smoker ( PT states former smoker 10yrs ago, EHR states current smoker)                           Cardiovascular:  Exercise tolerance: good (>4 METS),   (+) hypertension: moderate, hyperlipidemia      ECG reviewed  Rhythm: regular  Rate: normal  Echocardiogram reviewed         Beta Blocker:  Not on Beta Blocker         Neuro/Psych:   (+) neuromuscular disease:, psychiatric history:depression/anxiety              ROS comment: Hx: Osteomyelitis of left great toe, rhabdomyolysis, dementia GI/Hepatic/Renal:   (+) GERD ( New per patient): no interval change,           Endo/Other:    (+) DiabetesType II DM, well controlled, using insulin, blood dyscrasia ( Aspirin last taken 3/24/21): anticoagulation therapy:., electrolyte abnormalities ( Na 131), . Abdominal:           Vascular:           ROS comment: Hx: neuropathy, numbness and tingling in feet. Anesthesia Plan      MAC     ASA 3       Induction: intravenous. Anesthetic plan and risks discussed with patient.                       Dari Aguirre MD   3/27/2021        Patient will need to be re-evaluated prior to surgery by DOS anesthesiologist.    Dari Aguirre MD           3/27/2021        4:06 PM

## 2021-03-27 NOTE — PROGRESS NOTES
[address] [place] [address] [facility]  Progress Note    SUBJECTIVE:  Chief Complaint   Patient presents with    Wound Check     sent in by pediatry for ingrown toe nail,      Patient is tolerating medications. Anxious & with a lot of questions regarding planned partial amputation of the left great toe. NO fevers or chills. No N/V/D    Review of systems:  As stated above in the chief complaint, otherwise negative. Medications:  Scheduled Meds:   metroNIDAZOLE  500 mg Oral 3 times per day    hydrOXYzine  25 mg Oral Nightly    ceFAZolin  2,000 mg Intravenous Q8H    aspirin  81 mg Oral Daily    cloNIDine  0.2 mg Oral BID    Vitamin D  2,000 Units Oral Lunch    famotidine  20 mg Oral BID    fenofibrate  160 mg Oral Daily    glipiZIDE  10 mg Oral BID AC    insulin glargine  15 Units Subcutaneous Daily    magnesium oxide  400 mg Oral Daily    metFORMIN  500 mg Oral BID WC    rosuvastatin  40 mg Oral Nightly    sertraline  50 mg Oral Daily    alogliptin  12.5 mg Oral Daily     Continuous Infusions:  PRN Meds:acetaminophen, HYDROcodone 5 mg - acetaminophen    OBJECTIVE:  /65   Pulse 67   Temp 98.1 °F (36.7 °C) (Oral)   Resp 16   Ht 5' 7\" (1.702 m)   Wt 177 lb 4.8 oz (80.4 kg)   SpO2 98%   BMI 27.77 kg/m²   Temp  Av.2 °F (36.8 °C)  Min: 98.1 °F (36.7 °C)  Max: 98.3 °F (36.8 °C)  Constitutional: The patient is lying in bed in no distress. Some anxiety regarding planned surgery   Skin: Warm and dry. No rashes were noted. HEENT: Round and reactive pupils. Moist mucous membranes. No ulcerations or thrush. Neck: Supple to movements. Chest: No respiratory distress. Clear bilaterally. Cardiovascular: S1 and S2 are rhythmic and regular. No murmurs appreciated. Abdomen: Positive bowel sounds to auscultation. Benign to palpation. No masses felt. Extremities: No edema. Left great toe is wrapped in a dry dressing post op.  No drainage noted   Lines: peripheral    Laboratory and Tests Review:  Lab Results   Component Value Date    WBC 6.9 03/25/2021    WBC 6.2 03/24/2021    WBC 6.0 09/07/2019    HGB 12.8 03/25/2021    HCT 38.1 03/25/2021    MCV 84.7 03/25/2021     03/25/2021     Lab Results   Component Value Date    NEUTROABS 3.94 03/24/2021    NEUTROABS 4.14 09/07/2019    NEUTROABS 4.60 07/03/2018     No results found for: CRPHS  Lab Results   Component Value Date    ALT 15 03/25/2021    AST 27 03/25/2021    ALKPHOS 59 03/25/2021    BILITOT 0.2 03/25/2021     Lab Results   Component Value Date     03/25/2021    K 4.3 03/25/2021    K 4.2 03/24/2021    CL 99 03/25/2021    CO2 24 03/25/2021    BUN 22 03/25/2021    CREATININE 1.2 03/25/2021    CREATININE 1.3 03/24/2021    CREATININE 1.4 09/07/2019    GFRAA >60 03/25/2021    LABGLOM >60 03/25/2021    GLUCOSE 285 03/25/2021    PROT 6.5 03/25/2021    LABALBU 3.8 03/25/2021    CALCIUM 8.8 03/25/2021    BILITOT 0.2 03/25/2021    ALKPHOS 59 03/25/2021    AST 27 03/25/2021    ALT 15 03/25/2021     Lab Results   Component Value Date    CRP 1.2 (H) 03/25/2021    CRP 1.4 (H) 03/24/2021     Lab Results   Component Value Date    SEDRATE 38 (H) 03/25/2021    SEDRATE 22 (H) 03/24/2021    SEDRATE 11 01/10/2018     Radiology:  Noted     Microbiology:   Blood cultures 3/24/2021 - negative so far  Wound cutlure 3/24/21 - no growth so far  Surgical BONE cultures 3/25/2021 - Corynebacterium   Anerobic wound culture 3/24/2021 - growth present, evaluating for anerobes. ASSESSMENT:  · Probable chronic osteomyelitis of the left hallux  · Probable paronychia  · S/p bone biopsy & culture of left great toe 3/25/2021     PLAN:  · Continue Cefazolin & Metronidazole for now  · Will likely be able to transition to orals after surgery   · For partial amputation of left great toe per podiatry   · Follow surgical cultures   · Check final cultures  · Monitor labs    Elisa Portillo  11:58 AM  3/27/2021     Patient seen and examined.  I

## 2021-03-28 ENCOUNTER — ANESTHESIA (OUTPATIENT)
Dept: OPERATING ROOM | Age: 71
DRG: 617 | End: 2021-03-28
Payer: MEDICARE

## 2021-03-28 VITALS — OXYGEN SATURATION: 98 % | SYSTOLIC BLOOD PRESSURE: 120 MMHG | DIASTOLIC BLOOD PRESSURE: 61 MMHG

## 2021-03-28 LAB
ANAEROBIC CULTURE: ABNORMAL
ANAEROBIC CULTURE: ABNORMAL
ANAEROBIC CULTURE: NORMAL
CULTURE SURGICAL: ABNORMAL
INR BLD: 1
METER GLUCOSE: 154 MG/DL (ref 74–99)
METER GLUCOSE: 183 MG/DL (ref 74–99)
METER GLUCOSE: 199 MG/DL (ref 74–99)
METER GLUCOSE: 201 MG/DL (ref 74–99)
ORGANISM: ABNORMAL
ORGANISM: ABNORMAL
PROTHROMBIN TIME: 10.9 SEC (ref 9.3–12.4)

## 2021-03-28 PROCEDURE — 1200000000 HC SEMI PRIVATE

## 2021-03-28 PROCEDURE — 88304 TISSUE EXAM BY PATHOLOGIST: CPT

## 2021-03-28 PROCEDURE — 2500000003 HC RX 250 WO HCPCS: Performed by: NURSE ANESTHETIST, CERTIFIED REGISTERED

## 2021-03-28 PROCEDURE — 6370000000 HC RX 637 (ALT 250 FOR IP): Performed by: SPECIALIST

## 2021-03-28 PROCEDURE — 3700000001 HC ADD 15 MINUTES (ANESTHESIA): Performed by: PODIATRIST

## 2021-03-28 PROCEDURE — 82962 GLUCOSE BLOOD TEST: CPT

## 2021-03-28 PROCEDURE — 3600000003 HC SURGERY LEVEL 3 BASE: Performed by: PODIATRIST

## 2021-03-28 PROCEDURE — 36415 COLL VENOUS BLD VENIPUNCTURE: CPT

## 2021-03-28 PROCEDURE — 2580000003 HC RX 258: Performed by: NURSE ANESTHETIST, CERTIFIED REGISTERED

## 2021-03-28 PROCEDURE — 2500000003 HC RX 250 WO HCPCS: Performed by: PODIATRIST

## 2021-03-28 PROCEDURE — 85610 PROTHROMBIN TIME: CPT

## 2021-03-28 PROCEDURE — 88311 DECALCIFY TISSUE: CPT

## 2021-03-28 PROCEDURE — 3700000000 HC ANESTHESIA ATTENDED CARE: Performed by: PODIATRIST

## 2021-03-28 PROCEDURE — 7100000010 HC PHASE II RECOVERY - FIRST 15 MIN: Performed by: PODIATRIST

## 2021-03-28 PROCEDURE — 3600000013 HC SURGERY LEVEL 3 ADDTL 15MIN: Performed by: PODIATRIST

## 2021-03-28 PROCEDURE — 2580000003 HC RX 258: Performed by: PODIATRIST

## 2021-03-28 PROCEDURE — 2709999900 HC NON-CHARGEABLE SUPPLY: Performed by: PODIATRIST

## 2021-03-28 PROCEDURE — 6360000002 HC RX W HCPCS: Performed by: NURSE ANESTHETIST, CERTIFIED REGISTERED

## 2021-03-28 PROCEDURE — 0Y6Q0Z3 DETACHMENT AT LEFT 1ST TOE, LOW, OPEN APPROACH: ICD-10-PCS | Performed by: PODIATRIST

## 2021-03-28 PROCEDURE — 6370000000 HC RX 637 (ALT 250 FOR IP): Performed by: REGISTERED NURSE

## 2021-03-28 PROCEDURE — 7100000011 HC PHASE II RECOVERY - ADDTL 15 MIN: Performed by: PODIATRIST

## 2021-03-28 PROCEDURE — 6360000002 HC RX W HCPCS: Performed by: SPECIALIST

## 2021-03-28 PROCEDURE — 6370000000 HC RX 637 (ALT 250 FOR IP): Performed by: GENERAL PRACTICE

## 2021-03-28 RX ORDER — DIPHENHYDRAMINE HYDROCHLORIDE 50 MG/ML
12.5 INJECTION INTRAMUSCULAR; INTRAVENOUS
Status: DISCONTINUED | OUTPATIENT
Start: 2021-03-28 | End: 2021-03-28 | Stop reason: HOSPADM

## 2021-03-28 RX ORDER — PROPOFOL 10 MG/ML
INJECTION, EMULSION INTRAVENOUS CONTINUOUS PRN
Status: DISCONTINUED | OUTPATIENT
Start: 2021-03-28 | End: 2021-03-28 | Stop reason: SDUPTHER

## 2021-03-28 RX ORDER — BUPIVACAINE HYDROCHLORIDE 5 MG/ML
INJECTION, SOLUTION EPIDURAL; INTRACAUDAL PRN
Status: DISCONTINUED | OUTPATIENT
Start: 2021-03-28 | End: 2021-03-28 | Stop reason: ALTCHOICE

## 2021-03-28 RX ORDER — FENTANYL CITRATE 50 UG/ML
25 INJECTION, SOLUTION INTRAMUSCULAR; INTRAVENOUS EVERY 5 MIN PRN
Status: DISCONTINUED | OUTPATIENT
Start: 2021-03-28 | End: 2021-03-28 | Stop reason: HOSPADM

## 2021-03-28 RX ORDER — PROMETHAZINE HYDROCHLORIDE 25 MG/ML
6.25 INJECTION, SOLUTION INTRAMUSCULAR; INTRAVENOUS PRN
Status: DISCONTINUED | OUTPATIENT
Start: 2021-03-28 | End: 2021-03-28 | Stop reason: HOSPADM

## 2021-03-28 RX ORDER — MEPERIDINE HYDROCHLORIDE 25 MG/ML
12.5 INJECTION INTRAMUSCULAR; INTRAVENOUS; SUBCUTANEOUS EVERY 10 MIN PRN
Status: DISCONTINUED | OUTPATIENT
Start: 2021-03-28 | End: 2021-03-28 | Stop reason: HOSPADM

## 2021-03-28 RX ORDER — PROPOFOL 10 MG/ML
INJECTION, EMULSION INTRAVENOUS PRN
Status: DISCONTINUED | OUTPATIENT
Start: 2021-03-28 | End: 2021-03-28 | Stop reason: SDUPTHER

## 2021-03-28 RX ORDER — DOXYCYCLINE HYCLATE 100 MG/1
100 CAPSULE ORAL EVERY 12 HOURS SCHEDULED
Status: DISCONTINUED | OUTPATIENT
Start: 2021-03-28 | End: 2021-03-29 | Stop reason: HOSPADM

## 2021-03-28 RX ORDER — SODIUM CHLORIDE, SODIUM LACTATE, POTASSIUM CHLORIDE, CALCIUM CHLORIDE 600; 310; 30; 20 MG/100ML; MG/100ML; MG/100ML; MG/100ML
INJECTION, SOLUTION INTRAVENOUS CONTINUOUS PRN
Status: DISCONTINUED | OUTPATIENT
Start: 2021-03-28 | End: 2021-03-28 | Stop reason: SDUPTHER

## 2021-03-28 RX ORDER — DOXYCYCLINE HYCLATE 100 MG
100 TABLET ORAL EVERY 12 HOURS SCHEDULED
Qty: 28 TABLET | Refills: 0 | Status: SHIPPED | OUTPATIENT
Start: 2021-03-28 | End: 2021-04-11

## 2021-03-28 RX ORDER — LIDOCAINE HYDROCHLORIDE 20 MG/ML
INJECTION, SOLUTION EPIDURAL; INFILTRATION; INTRACAUDAL; PERINEURAL PRN
Status: DISCONTINUED | OUTPATIENT
Start: 2021-03-28 | End: 2021-03-28 | Stop reason: SDUPTHER

## 2021-03-28 RX ORDER — OXYCODONE HYDROCHLORIDE AND ACETAMINOPHEN 5; 325 MG/1; MG/1
1 TABLET ORAL PRN
Status: DISCONTINUED | OUTPATIENT
Start: 2021-03-28 | End: 2021-03-28 | Stop reason: HOSPADM

## 2021-03-28 RX ADMIN — Medication 400 MG: at 11:21

## 2021-03-28 RX ADMIN — ACETAMINOPHEN 650 MG: 325 TABLET ORAL at 12:33

## 2021-03-28 RX ADMIN — ROSUVASTATIN CALCIUM 40 MG: 20 TABLET, FILM COATED ORAL at 21:17

## 2021-03-28 RX ADMIN — HYDROCODONE BITARTRATE AND ACETAMINOPHEN 1 TABLET: 5; 325 TABLET ORAL at 16:43

## 2021-03-28 RX ADMIN — ALOGLIPTIN 12.5 MG: 12.5 TABLET, FILM COATED ORAL at 11:20

## 2021-03-28 RX ADMIN — METFORMIN HYDROCHLORIDE 500 MG: 500 TABLET ORAL at 16:32

## 2021-03-28 RX ADMIN — GLIPIZIDE 10 MG: 5 TABLET ORAL at 16:32

## 2021-03-28 RX ADMIN — HYDROCODONE BITARTRATE AND ACETAMINOPHEN 1 TABLET: 5; 325 TABLET ORAL at 05:29

## 2021-03-28 RX ADMIN — CLONIDINE HYDROCHLORIDE 0.2 MG: 0.2 TABLET ORAL at 21:19

## 2021-03-28 RX ADMIN — Medication 10 ML: at 09:21

## 2021-03-28 RX ADMIN — METFORMIN HYDROCHLORIDE 500 MG: 500 TABLET ORAL at 11:20

## 2021-03-28 RX ADMIN — FAMOTIDINE 20 MG: 20 TABLET ORAL at 11:20

## 2021-03-28 RX ADMIN — PROPOFOL 150 MCG/KG/MIN: 10 INJECTION, EMULSION INTRAVENOUS at 09:17

## 2021-03-28 RX ADMIN — Medication 2000 UNITS: at 11:20

## 2021-03-28 RX ADMIN — SERTRALINE HYDROCHLORIDE 50 MG: 50 TABLET ORAL at 11:21

## 2021-03-28 RX ADMIN — INSULIN GLARGINE 15 UNITS: 100 INJECTION, SOLUTION SUBCUTANEOUS at 08:19

## 2021-03-28 RX ADMIN — SODIUM CHLORIDE, POTASSIUM CHLORIDE, SODIUM LACTATE AND CALCIUM CHLORIDE: 600; 310; 30; 20 INJECTION, SOLUTION INTRAVENOUS at 08:50

## 2021-03-28 RX ADMIN — PROPOFOL 50 MG: 10 INJECTION, EMULSION INTRAVENOUS at 09:17

## 2021-03-28 RX ADMIN — LIDOCAINE HYDROCHLORIDE 60 MG: 20 INJECTION, SOLUTION EPIDURAL; INFILTRATION; INTRACAUDAL; PERINEURAL at 09:17

## 2021-03-28 RX ADMIN — FENOFIBRATE 160 MG: 160 TABLET ORAL at 11:20

## 2021-03-28 RX ADMIN — CLONIDINE HYDROCHLORIDE 0.2 MG: 0.2 TABLET ORAL at 11:21

## 2021-03-28 RX ADMIN — FAMOTIDINE 20 MG: 20 TABLET ORAL at 21:19

## 2021-03-28 RX ADMIN — DOXYCYCLINE HYCLATE 100 MG: 100 CAPSULE ORAL at 21:19

## 2021-03-28 RX ADMIN — METRONIDAZOLE 500 MG: 500 TABLET, FILM COATED ORAL at 06:00

## 2021-03-28 RX ADMIN — HYDROXYZINE PAMOATE 25 MG: 25 CAPSULE ORAL at 21:19

## 2021-03-28 RX ADMIN — Medication 2000 MG: at 05:45

## 2021-03-28 RX ADMIN — ACETAMINOPHEN 650 MG: 325 TABLET ORAL at 21:17

## 2021-03-28 ASSESSMENT — PAIN DESCRIPTION - DESCRIPTORS
DESCRIPTORS: ACHING;DISCOMFORT
DESCRIPTORS: DISCOMFORT;SHARP

## 2021-03-28 ASSESSMENT — PULMONARY FUNCTION TESTS
PIF_VALUE: 0
PIF_VALUE: 1
PIF_VALUE: 0
PIF_VALUE: 1
PIF_VALUE: 0
PIF_VALUE: 0
PIF_VALUE: 1
PIF_VALUE: 0
PIF_VALUE: 1
PIF_VALUE: 1

## 2021-03-28 ASSESSMENT — PAIN SCALES - GENERAL
PAINLEVEL_OUTOF10: 8
PAINLEVEL_OUTOF10: 3
PAINLEVEL_OUTOF10: 0
PAINLEVEL_OUTOF10: 2
PAINLEVEL_OUTOF10: 8

## 2021-03-28 ASSESSMENT — PAIN DESCRIPTION - PAIN TYPE: TYPE: ACUTE PAIN;SURGICAL PAIN

## 2021-03-28 ASSESSMENT — PAIN DESCRIPTION - LOCATION: LOCATION: HEAD

## 2021-03-28 ASSESSMENT — PAIN DESCRIPTION - ONSET: ONSET: ON-GOING

## 2021-03-28 ASSESSMENT — PAIN DESCRIPTION - FREQUENCY: FREQUENCY: CONTINUOUS

## 2021-03-28 ASSESSMENT — LIFESTYLE VARIABLES: SMOKING_STATUS: 1

## 2021-03-28 ASSESSMENT — PAIN DESCRIPTION - ORIENTATION: ORIENTATION: MID

## 2021-03-28 NOTE — PROGRESS NOTES
Patient for partial amputation of distal phalanx left great toe. Labs vitals stable. Tolerating meds.  Continue with antibiotics

## 2021-03-28 NOTE — PROGRESS NOTES
[address] [place] [address] [facility]  Progress Note    SUBJECTIVE:  Chief Complaint   Patient presents with    Wound Check     sent in by pediatry for ingrown toe nail,      The patient underwent amputation. Tolerating antibiotics. No nausea or vomiting. Review of systems:  As stated above in the chief complaint, otherwise negative. Medications:  Scheduled Meds:   metroNIDAZOLE  500 mg Oral 3 times per day    hydrOXYzine  25 mg Oral Nightly    ceFAZolin  2,000 mg Intravenous Q8H    aspirin  81 mg Oral Daily    cloNIDine  0.2 mg Oral BID    Vitamin D  2,000 Units Oral Lunch    famotidine  20 mg Oral BID    fenofibrate  160 mg Oral Daily    glipiZIDE  10 mg Oral BID AC    insulin glargine  15 Units Subcutaneous Daily    magnesium oxide  400 mg Oral Daily    metFORMIN  500 mg Oral BID WC    rosuvastatin  40 mg Oral Nightly    sertraline  50 mg Oral Daily    alogliptin  12.5 mg Oral Daily     Continuous Infusions:  PRN Meds:acetaminophen, HYDROcodone 5 mg - acetaminophen    OBJECTIVE:  BP (!) 175/95   Pulse 62   Temp 98 °F (36.7 °C) (Oral)   Resp 18   Ht 5' 7\" (1.702 m)   Wt 176 lb (79.8 kg)   SpO2 98%   BMI 27.57 kg/m²   Temp  Av.1 °F (36.7 °C)  Min: 97.8 °F (36.6 °C)  Max: 98.4 °F (36.9 °C)  Constitutional: The patient is sitting on the side of the bed. Awake and alert. No distress. Skin: Warm and dry. No rashes were noted. HEENT: Round and reactive pupils. Moist mucous membranes. No ulcerations or thrush. Neck: Supple to movements. Chest: No respiratory distress. Clear bilaterally. Cardiovascular: Heart sounds rhythmic and regular. Abdomen: Positive bowel sounds to auscultation. Benign to palpation. No masses felt. Extremities: No edema. Left great distal toe amputated. Dressed.   Lines: peripheral    Laboratory and Tests Review:  Lab Results   Component Value Date    WBC 6.9 2021    WBC 6.2 2021    WBC 6.0 2019    HGB 12.8 2021 HCT 38.1 03/25/2021    MCV 84.7 03/25/2021     03/25/2021     Lab Results   Component Value Date    NEUTROABS 3.94 03/24/2021    NEUTROABS 4.14 09/07/2019    NEUTROABS 4.60 07/03/2018     No results found for: CRP  Lab Results   Component Value Date    ALT 15 03/25/2021    AST 27 03/25/2021    ALKPHOS 59 03/25/2021    BILITOT 0.2 03/25/2021     Lab Results   Component Value Date     03/25/2021    K 4.3 03/25/2021    K 4.2 03/24/2021    CL 99 03/25/2021    CO2 24 03/25/2021    BUN 22 03/25/2021    CREATININE 1.2 03/25/2021    CREATININE 1.3 03/24/2021    CREATININE 1.4 09/07/2019    GFRAA >60 03/25/2021    LABGLOM >60 03/25/2021    GLUCOSE 285 03/25/2021    PROT 6.5 03/25/2021    LABALBU 3.8 03/25/2021    CALCIUM 8.8 03/25/2021    BILITOT 0.2 03/25/2021    ALKPHOS 59 03/25/2021    AST 27 03/25/2021    ALT 15 03/25/2021     Lab Results   Component Value Date    CRP 1.2 (H) 03/25/2021    CRP 1.4 (H) 03/24/2021     Lab Results   Component Value Date    SEDRATE 38 (H) 03/25/2021    SEDRATE 22 (H) 03/24/2021    SEDRATE 11 01/10/2018     Radiology:  Noted     Microbiology:   Blood cultures 3/24/2021 - negative so far  Wound cutlure 3/24/21 - no growth so far  Surgical BONE cultures 3/25/2021 - Corynebacterium, Staphylococcus capitis. No anaerobes isolated  Anerobic wound culture 3/24/2021: Negative    ASSESSMENT:  · Acute versus chronic osteomyelitis of the left first distal phalanx. Status post distal phalanx amputation 3/28/2021.   Culture grew Staph capitis and Corynebacterium  · Probable paronychia  · S/p bone biopsy & culture of left great toe 3/25/2021     PLAN:  · Change Cefazolin and Metronidazole to oral Doxycycline  · If no new organisms growing, he can go home on 2 weeks of Doxycycline    Lu Cleveland  12:24 PM  3/28/2021

## 2021-03-28 NOTE — OP NOTE
Operative Note      Patient: Angelo Mariano  YOB: 1950  MRN: [id number]    Date of Procedure: 3/28/2021    Pre-Op Diagnosis: OSTEOMYELITIS, DISTAL PHALANX LEFT GREAT TOE    Post-Op Diagnosis: Same       Procedure(s):  LEFT FOOT TOE AMPUTATION,  LEFT FOOT PARTIAL HALLUX AMPUTATION WITH DEBRIDEMENT OF NON VIABLE TISSUE    Surgeon(s):  Isaiah Shea DPM    Assistant:   Resident: Odalis Pacheco DPM    Anesthesia: Monitor Anesthesia Care    Estimated Blood Loss (mL): less than 50     Complications: None    Specimens:   ID Type Source Tests Collected by Time Destination   A : DISTAL PHALANX LEFT FIRST TOE Specimen Toe SURGICAL PATHOLOGY Isaiah Shea DPM 3/28/2021 0946        Implants:  * No implants in log *      Drains: * No LDAs found *    Findings: Osteomyelitis, left great toe, distal phalanx. Detailed Description of Procedure: The patient was brought to the operating room and placed on the operating table in the supine position. Local anesthesia was administered to the left great toe with a total of 3 cc of 0.5% Marcaine. Anesthesia was then administered to the patient as stated in the anesthesia record. Attention was then directed to the left great toe. A #15 blade was used to make an incision from medial to lateral directly over the interphalangeal joint. The incision started on the medial side of the toe, long term between the dorsal and plantar aspect of the toe. The incision ended on the lateral side of the toe, long term between the dorsal and plantar aspect of the toe. The medial end of the incision was then extended distally to the tip of the toe and then proximally along the lateral side of the toe to join with the original dorsal incision. A #15 blade was then used to deepen the incision down to bone. The extensor and flexor tendons were cut at the level of the interphalangeal joint.   The collateral ligaments were released at the medial and lateral aspects of the joint.  A #15 blade was then used to separate the remaining soft tissue attachments of the distal phalanx both proximally and plantarly. The distal phalanx and the overlying soft tissues, skin, and toenail was then removed from the surgical site and sent to pathology. The surgical site was then inspected and all the remaining soft tissues at the left great toe appeared healthy and vital.  The surgical site was then flushed with 2 L of sterile saline using pulsed lavage. The surgical site was inspected again, and all the remaining soft tissues appeared healthy and vital.    The plantar flap of soft tissue and skin was then sutured to the dorsal skin on the left great toe using simple interrupted sutures of 3-0 nylon. Capillary fill time was 1 second to the left great toe following closure. The surgical site was then dressed with sterile Adaptic, sterile 4 x 4 gauze, and sterile Renetta. An Ace bandage was then applied to the left foot, ankle, and lower leg. Patient tolerated the surgical procedure well, and left the operating room in good condition with vital signs stable.     Electronically signed by Alan Aschoff, DPM on 3/28/2021 at 10:32 AM

## 2021-03-28 NOTE — ANESTHESIA POSTPROCEDURE EVALUATION
Department of Anesthesiology  Postprocedure Note    Patient: Pj Diaz  MRN: [id number]  YOB: 1950  Date of evaluation: 3/28/2021  Time:  4:20 PM     Procedure Summary     Date: 03/28/21 Room / Location: [facility] 01 / [facility]    Anesthesia Start: 8565 Anesthesia Stop: 1010    Procedure: LEFT FOOT TOE AMPUTATION,  LEFT FOOT PARTIAL HALLUX AMPUTATION WITH DEBRIDEMENT OF NON VIABLE TISSUE (Left ) Diagnosis: (OSTEOMYELITIS LEFT GREAT TOE)    Surgeons: Ida Bella DPM Responsible Provider: Lauri Kilpatrick MD    Anesthesia Type: MAC ASA Status: 3          Anesthesia Type: MAC    Aura Phase I: Aura Score: 10    Aura Phase II: Aura Score: 10    Last vitals: Reviewed and per EMR flowsheets.        Anesthesia Post Evaluation    Patient location during evaluation: PACU  Patient participation: complete - patient participated  Level of consciousness: awake and alert  Airway patency: patent  Nausea & Vomiting: no vomiting and no nausea  Complications: no  Cardiovascular status: blood pressure returned to baseline  Respiratory status: acceptable  Hydration status: euvolemic

## 2021-03-29 VITALS
SYSTOLIC BLOOD PRESSURE: 142 MMHG | RESPIRATION RATE: 18 BRPM | WEIGHT: 171 LBS | TEMPERATURE: 98.1 F | HEIGHT: 67 IN | HEART RATE: 74 BPM | BODY MASS INDEX: 26.84 KG/M2 | OXYGEN SATURATION: 98 % | DIASTOLIC BLOOD PRESSURE: 75 MMHG

## 2021-03-29 LAB
ANION GAP SERPL CALCULATED.3IONS-SCNC: 10 MMOL/L (ref 7–16)
BASOPHILS ABSOLUTE: 0.03 E9/L (ref 0–0.2)
BASOPHILS RELATIVE PERCENT: 0.4 % (ref 0–2)
BLOOD CULTURE, ROUTINE: NORMAL
BUN BLDV-MCNC: 20 MG/DL (ref 8–23)
C-REACTIVE PROTEIN: 0.7 MG/DL (ref 0–0.4)
CALCIUM SERPL-MCNC: 9.4 MG/DL (ref 8.6–10.2)
CHLORIDE BLD-SCNC: 98 MMOL/L (ref 98–107)
CO2: 26 MMOL/L (ref 22–29)
CREAT SERPL-MCNC: 1.1 MG/DL (ref 0.7–1.2)
CULTURE, BLOOD 2: NORMAL
EOSINOPHILS ABSOLUTE: 0.35 E9/L (ref 0.05–0.5)
EOSINOPHILS RELATIVE PERCENT: 5.2 % (ref 0–6)
GFR AFRICAN AMERICAN: >60
GFR NON-AFRICAN AMERICAN: >60 ML/MIN/1.73
GLUCOSE BLD-MCNC: 237 MG/DL (ref 74–99)
HCT VFR BLD CALC: 40.1 % (ref 37–54)
HEMOGLOBIN: 12.9 G/DL (ref 12.5–16.5)
IMMATURE GRANULOCYTES #: 0.02 E9/L
IMMATURE GRANULOCYTES %: 0.3 % (ref 0–5)
LYMPHOCYTES ABSOLUTE: 1.03 E9/L (ref 1.5–4)
LYMPHOCYTES RELATIVE PERCENT: 15.3 % (ref 20–42)
MCH RBC QN AUTO: 28.4 PG (ref 26–35)
MCHC RBC AUTO-ENTMCNC: 32.2 % (ref 32–34.5)
MCV RBC AUTO: 88.1 FL (ref 80–99.9)
METER GLUCOSE: 160 MG/DL (ref 74–99)
METER GLUCOSE: 220 MG/DL (ref 74–99)
MONOCYTES ABSOLUTE: 0.46 E9/L (ref 0.1–0.95)
MONOCYTES RELATIVE PERCENT: 6.8 % (ref 2–12)
NEUTROPHILS ABSOLUTE: 4.83 E9/L (ref 1.8–7.3)
NEUTROPHILS RELATIVE PERCENT: 72 % (ref 43–80)
PDW BLD-RTO: 14.6 FL (ref 11.5–15)
PLATELET # BLD: 228 E9/L (ref 130–450)
PMV BLD AUTO: 10.2 FL (ref 7–12)
POTASSIUM SERPL-SCNC: 4.5 MMOL/L (ref 3.5–5)
RBC # BLD: 4.55 E12/L (ref 3.8–5.8)
SEDIMENTATION RATE, ERYTHROCYTE: 35 MM/HR (ref 0–15)
SODIUM BLD-SCNC: 134 MMOL/L (ref 132–146)
WBC # BLD: 6.7 E9/L (ref 4.5–11.5)

## 2021-03-29 PROCEDURE — 6370000000 HC RX 637 (ALT 250 FOR IP): Performed by: GENERAL PRACTICE

## 2021-03-29 PROCEDURE — 6370000000 HC RX 637 (ALT 250 FOR IP): Performed by: SPECIALIST

## 2021-03-29 PROCEDURE — 85025 COMPLETE CBC W/AUTO DIFF WBC: CPT

## 2021-03-29 PROCEDURE — 86140 C-REACTIVE PROTEIN: CPT

## 2021-03-29 PROCEDURE — 85651 RBC SED RATE NONAUTOMATED: CPT

## 2021-03-29 PROCEDURE — 82962 GLUCOSE BLOOD TEST: CPT

## 2021-03-29 PROCEDURE — 36415 COLL VENOUS BLD VENIPUNCTURE: CPT

## 2021-03-29 PROCEDURE — 80048 BASIC METABOLIC PNL TOTAL CA: CPT

## 2021-03-29 RX ORDER — HYDROXYZINE PAMOATE 25 MG/1
25 CAPSULE ORAL NIGHTLY
Qty: 14 CAPSULE | Refills: 0 | Status: SHIPPED | OUTPATIENT
Start: 2021-03-29 | End: 2021-04-12

## 2021-03-29 RX ADMIN — ACETAMINOPHEN 650 MG: 325 TABLET ORAL at 12:47

## 2021-03-29 RX ADMIN — SERTRALINE HYDROCHLORIDE 50 MG: 50 TABLET ORAL at 08:18

## 2021-03-29 RX ADMIN — METFORMIN HYDROCHLORIDE 500 MG: 500 TABLET ORAL at 08:17

## 2021-03-29 RX ADMIN — FENOFIBRATE 160 MG: 160 TABLET ORAL at 08:18

## 2021-03-29 RX ADMIN — Medication 2000 UNITS: at 11:49

## 2021-03-29 RX ADMIN — Medication 400 MG: at 08:18

## 2021-03-29 RX ADMIN — ASPIRIN 81 MG: 81 TABLET, COATED ORAL at 08:18

## 2021-03-29 RX ADMIN — FAMOTIDINE 20 MG: 20 TABLET ORAL at 08:18

## 2021-03-29 RX ADMIN — ALOGLIPTIN 12.5 MG: 12.5 TABLET, FILM COATED ORAL at 08:18

## 2021-03-29 RX ADMIN — ACETAMINOPHEN 650 MG: 325 TABLET ORAL at 06:33

## 2021-03-29 RX ADMIN — GLIPIZIDE 10 MG: 5 TABLET ORAL at 06:34

## 2021-03-29 RX ADMIN — CLONIDINE HYDROCHLORIDE 0.2 MG: 0.2 TABLET ORAL at 08:18

## 2021-03-29 RX ADMIN — DOXYCYCLINE HYCLATE 100 MG: 100 CAPSULE ORAL at 08:18

## 2021-03-29 RX ADMIN — INSULIN GLARGINE 15 UNITS: 100 INJECTION, SOLUTION SUBCUTANEOUS at 08:19

## 2021-03-29 ASSESSMENT — PAIN SCALES - GENERAL: PAINLEVEL_OUTOF10: 6

## 2021-03-29 NOTE — PATIENT CARE CONFERENCE
P Quality Flow/Interdisciplinary Rounds Progress Note        Quality Flow Rounds held on March 29, 2021    Disciplines Attending:  Bedside Nurse, ,  and Nursing Unit 136 Barbara Jasmine was admitted on 3/24/2021 12:43 PM    Anticipated Discharge Date:  Expected Discharge Date: 03/29/21    Disposition:    Diogenes Score:  Diogenes Scale Score: 21    Readmission Risk              Risk of Unplanned Readmission:        9           Discussed patient goal for the day, patient clinical progression, and barriers to discharge.   The following Goal(s) of the Day/Commitment(s) have been identified:  Discharge - Obtain Order      Nhan Hillman  March 29, 2021

## 2021-03-29 NOTE — PROGRESS NOTES
Spoke to Dr. Shanice Clinton, and no wound care will be needed at discharge. He will follow at Dr. Luis A Helms office for all wound care.     Electronically signed by Neal Jasmine RN on 3/29/2021 at 12:09 PM

## 2021-03-29 NOTE — DISCHARGE SUMMARY
[address] [address]                               DISCHARGE SUMMARY    PATIENT NAME: Prosper Clinton                   :        1950  MED REC NO:   [id number]                            ROOM:       9526  ACCOUNT NO:   [de-identified]                           ADMIT DATE: 2021  PROVIDER:     Mindi Mg DO                  [facility] DATE: 2021    FINAL DIAGNOSES:  1. Acute-on-chronic osteomyelitis of left great toe. 2.  Cellulitis of left great toe. 3.  Osteoarthritis of left great toe. 4.  Diabetes mellitus type 2, fair control. 5.  Hypertension. HOSPITAL COURSE:  The patient is a 72-year-old male patient of mine, who  has been receiving lateral care from Podiatry. Apparently, he had  paronychia. Looks like he had an ingrown toenail clipped. May have  developed an infection there. He has been following along with Podiatry  who expressed a lot of pus and felt that the infection was tunneling  down into the bone. He did an x-ray of his bone. There were  questionable findings of osteomyelitis, asked me to admit the patient  for same and I did, and we had Infectious Disease see him and they  managed this with IV antibiotics and the patient had an MRI of the left  toe there and it was suggestive of osteomyelitis, so bone biopsy was  performed. Corynebacterium _____ species was isolated. He was  subsequently taken back to surgery where he had partial amputation of  the left great toe, primarily the distal phalanx. He tolerated the  surgery quite well and based upon the appropriate cultures that had been  taken, Infectious Disease recommends he goes home on doxycycline. He  has been given a clean dressing, instructions on how to care for his  wound. He is to follow up with Podiatry, see me in about a week. He  will go home on the doxycycline.   At the time of discharge, his  condition was

## 2021-03-29 NOTE — PLAN OF CARE
Problem: Falls - Risk of:  Goal: Will remain free from falls  Description: Will remain free from falls  3/29/2021 1012 by Jose Luis Baeza RN  Outcome: Met This Shift  3/29/2021 0108 by Amna Mooney RN  Outcome: Met This Shift  Goal: Absence of physical injury  Description: Absence of physical injury  3/29/2021 1012 by Jose Luis Baeza RN  Outcome: Met This Shift  3/29/2021 0108 by Amna Mooney RN  Outcome: Met This Shift     Problem: Pain:  Goal: Pain level will decrease  Description: Pain level will decrease  3/29/2021 1012 by Jose Luis Baeza RN  Outcome: Met This Shift  3/29/2021 0108 by Amna Mooney RN  Outcome: Met This Shift  Goal: Control of acute pain  Description: Control of acute pain  3/29/2021 1012 by Jose Luis Baeza RN  Outcome: Met This Shift  3/29/2021 0108 by Amna Mooney RN  Outcome: Ongoing  Goal: Control of chronic pain  Description: Control of chronic pain  3/29/2021 1012 by Jose Luis Baeza RN  Outcome: Met This Shift  3/29/2021 0108 by Amna Mooney RN  Outcome: Ongoing

## 2021-03-29 NOTE — PROGRESS NOTES
CLINICAL PHARMACY NOTE: MEDS TO [address] Select Patient?: No  Total # of Prescriptions Filled: 1   The following medications were delivered to the patient:  · Doxycycline 100 mg  Total # of Interventions Completed: 8  Time Spent (min): 60    Additional Documentation:

## 2021-03-29 NOTE — PROGRESS NOTES
as ordered to protect left foot. 5.  Patient must have okay from infectious disease before discharge.

## 2021-03-29 NOTE — PROGRESS NOTES
CLINICAL PHARMACY NOTE: MEDS TO [address] Select Patient?: No  Total # of Prescriptions Filled: 1   The following medications were delivered to the patient:  · Hydroxyzine pamoate 25  Total # of Interventions Completed: 6  Time Spent (min): 45    Additional Documentation:

## 2021-03-29 NOTE — PLAN OF CARE
Problem: Falls - Risk of:  Goal: Will remain free from falls  Description: Will remain free from falls  Outcome: Met This Shift  Goal: Absence of physical injury  Description: Absence of physical injury  Outcome: Met This Shift     Problem: Pain:  Goal: Pain level will decrease  Description: Pain level will decrease  Outcome: Met This Shift  Goal: Control of acute pain  Description: Control of acute pain  Outcome: Ongoing  Goal: Control of chronic pain  Description: Control of chronic pain  Outcome: Ongoing

## 2021-03-29 NOTE — PROGRESS NOTES
[address] [place] [address] [facility]  [facility]  Progress Note    SUBJECTIVE:  Chief Complaint   Patient presents with    Wound Check     sent in by pediatry for ingrown toe nail,      Patient is tolerating his antibiotics. No fevers or chills. Has some questions about restrictions that I directed he ask his podiatrist.   Doing well after amputation    Review of systems:  As stated above in the chief complaint, otherwise negative. Medications:  Scheduled Meds:   doxycycline hyclate  100 mg Oral 2 times per day    hydrOXYzine  25 mg Oral Nightly    aspirin  81 mg Oral Daily    cloNIDine  0.2 mg Oral BID    Vitamin D  2,000 Units Oral Lunch    famotidine  20 mg Oral BID    fenofibrate  160 mg Oral Daily    glipiZIDE  10 mg Oral BID AC    insulin glargine  15 Units Subcutaneous Daily    magnesium oxide  400 mg Oral Daily    metFORMIN  500 mg Oral BID WC    rosuvastatin  40 mg Oral Nightly    sertraline  50 mg Oral Daily    alogliptin  12.5 mg Oral Daily     Continuous Infusions:  PRN Meds:acetaminophen, HYDROcodone 5 mg - acetaminophen    OBJECTIVE:  BP (!) 158/72   Pulse 74   Temp 98.3 °F (36.8 °C) (Oral)   Resp 18   Ht 5' 7\" (1.702 m)   Wt 171 lb (77.6 kg)   SpO2 96%   BMI 26.78 kg/m²   Temp  Av °F (36.7 °C)  Min: 97.6 °F (36.4 °C)  Max: 98.3 °F (36.8 °C)  Constitutional: The patient is lying in bed. Surgical show on. Some anxiety about discharge. Skin: Warm and dry. No rashes were noted. HEENT: Round and reactive pupils. Moist mucous membranes. No ulcerations or thrush. Neck: Supple to movements. Chest: No respiratory distress. Clear bilaterally. Cardiovascular: Heart sounds rhythmic and regular. Abdomen: Positive bowel sounds to auscultation. Benign to palpation. No masses felt. Extremities: No edema. Left great distal toe amputated. Dressed. & surgical shoe on.    Lines: peripheral    Laboratory and Tests Review:  Lab Results   Component Value Date    WBC 6.9 seen and examined. I had a face to face encounter with the patient. Agree with exam.  Assessment and plan as outlined above and directed by me. Addition and corrections were done as deemed appropriate. My exam and plan include: The patient is doing well with current antibiotics. He had a chance to see the amputated toe for the first time. He was reassured that some of that swelling will go down. He already has his antibiotics. He can be discharged. Encouraged to call the office and make a follow-up appointment.     Pepe Herbert  3/29/2021  12:00 PM

## 2022-06-08 ENCOUNTER — OFFICE VISIT (OUTPATIENT)
Dept: PRIMARY CARE CLINIC | Age: 72
End: 2022-06-08
Payer: MEDICARE

## 2022-06-08 VITALS
BODY MASS INDEX: 27.78 KG/M2 | DIASTOLIC BLOOD PRESSURE: 92 MMHG | HEIGHT: 67 IN | SYSTOLIC BLOOD PRESSURE: 152 MMHG | TEMPERATURE: 98.9 F | WEIGHT: 177 LBS | HEART RATE: 72 BPM | RESPIRATION RATE: 16 BRPM | OXYGEN SATURATION: 96 %

## 2022-06-08 DIAGNOSIS — H61.23 BILATERAL IMPACTED CERUMEN: Primary | ICD-10-CM

## 2022-06-08 PROCEDURE — G8417 CALC BMI ABV UP PARAM F/U: HCPCS | Performed by: NURSE PRACTITIONER

## 2022-06-08 PROCEDURE — 1123F ACP DISCUSS/DSCN MKR DOCD: CPT | Performed by: NURSE PRACTITIONER

## 2022-06-08 PROCEDURE — 99213 OFFICE O/P EST LOW 20 MIN: CPT | Performed by: NURSE PRACTITIONER

## 2022-06-08 PROCEDURE — G8427 DOCREV CUR MEDS BY ELIG CLIN: HCPCS | Performed by: NURSE PRACTITIONER

## 2022-06-08 PROCEDURE — 3017F COLORECTAL CA SCREEN DOC REV: CPT | Performed by: NURSE PRACTITIONER

## 2022-06-08 PROCEDURE — 4004F PT TOBACCO SCREEN RCVD TLK: CPT | Performed by: NURSE PRACTITIONER

## 2022-06-08 NOTE — PROGRESS NOTES
Chief Complaint:   Ear Fullness (rt ear )      History of Present Illness   Source of history provided by:  patient. Sundar Watts is a 73 y.o. old male with a past medical history of:   Past Medical History:   Diagnosis Date    Dementia ([facility]     Depression     Diabetes mellitus ([facility]     Hypertension     Neuropathy         Pt presents to the [facility] care with right ear fullness-plugged like sensation for the past several days. No fever noted. Denies any N/V/D, sore throat, congestion, abdominal pain, CP, progressive SOB, dizziness, or lethargy. ROS    Unless otherwise stated in this report or unable to obtain because of the patient's clinical or mental status as evidenced by the medical record, this patients's positive and negative responses for Review of Systems, constitutional, psych, eyes, ENT, cardiovascular, respiratory, gastrointestinal, neurological, genitourinary, musculoskeletal, integument systems and systems related to the presenting problem are either stated in the preceding or were not pertinent or were negative for the symptoms and/or complaints related to the medical problem. 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 Social History:  reports that he has been smoking. He has been smoking about 0.25 packs per day. He has never used smokeless tobacco. He reports that he does not drink alcohol and does not use drugs. Family History: family history is not on file. Allergies: Patient has no known allergies. Physical Exam         VS:  BP (!) 152/92   Pulse 72   Temp 98.9 °F (37.2 °C)   Resp 16   Ht 5' 7\" (1.702 m)   Wt 177 lb (80.3 kg)   SpO2 96%   BMI 27.72 kg/m²    Oxygen Saturation Interpretation: Normal.    Constitutional:  Alert, development consistent with age. Ears:  External Ears: Normal bilateral pinna.              TM's & External Canals: TM's impacted bilaterally. APPEND:  After water irrigation flush, cerumen removed successfully, normal TMs and canals  Nose:   There is no obvious septal defect. Mouth:  Moist bucca mucosa and normal tongue. Throat: No posterior pharyngeal erythema, exudates or lesions. Neck:  Supple. There is no obvious adenopathy or neck tenderness. Lungs:   Breath sounds: Normal chest expansion and breath sounds noted throughout. No wheezes, rales, or rhonchi noted. Heart:  Regular rate and rhythm, normal heart sounds, without pathological murmurs, ectopy, gallops, or rubs. Skin:  Normal turgor. Warm, dry, without visible rash. Neurological:  Oriented. Motor functions intact. Lab / Imaging Results   (All laboratory and radiology results have been personally reviewed by myself)  Labs:  No results found for this visit on 06/08/22. Imaging: All Radiology results interpreted by Radiologist unless otherwise noted. No orders to display         Assessment / Plan     Impression(s):  1. Bilateral impacted cerumen      Disposition:  Disposition: successful removal of cerumen bilaterally.  No further treatment needed

## 2023-01-26 ENCOUNTER — APPOINTMENT (OUTPATIENT)
Dept: CT IMAGING | Age: 73
DRG: 637 | End: 2023-01-26
Payer: MEDICARE

## 2023-01-26 ENCOUNTER — HOSPITAL ENCOUNTER (INPATIENT)
Age: 73
LOS: 5 days | Discharge: HOME | DRG: 637 | End: 2023-01-31
Attending: EMERGENCY MEDICINE | Admitting: GENERAL PRACTICE
Payer: MEDICARE

## 2023-01-26 ENCOUNTER — APPOINTMENT (OUTPATIENT)
Dept: GENERAL RADIOLOGY | Age: 73
DRG: 637 | End: 2023-01-26
Payer: MEDICARE

## 2023-01-26 DIAGNOSIS — E13.10 DIABETIC KETOACIDOSIS WITHOUT COMA ASSOCIATED WITH OTHER SPECIFIED DIABETES MELLITUS (HCC): Primary | ICD-10-CM

## 2023-01-26 DIAGNOSIS — E08.10 DIABETIC KETOACIDOSIS WITHOUT COMA ASSOCIATED WITH DIABETES MELLITUS DUE TO UNDERLYING CONDITION (HCC): ICD-10-CM

## 2023-01-26 PROBLEM — E10.10 DKA, TYPE 1, NOT AT GOAL (HCC): Status: ACTIVE | Noted: 2023-01-26

## 2023-01-26 LAB
ALBUMIN SERPL-MCNC: 3.8 G/DL (ref 3.5–5.2)
ALP BLD-CCNC: 85 U/L (ref 40–129)
ALT SERPL-CCNC: 20 U/L (ref 0–40)
ANION GAP SERPL CALCULATED.3IONS-SCNC: 14 MMOL/L (ref 7–16)
ANION GAP SERPL CALCULATED.3IONS-SCNC: 17 MMOL/L (ref 7–16)
ANION GAP SERPL CALCULATED.3IONS-SCNC: 23 MMOL/L (ref 7–16)
ANION GAP SERPL CALCULATED.3IONS-SCNC: 24 MMOL/L (ref 7–16)
AST SERPL-CCNC: 41 U/L (ref 0–39)
BACTERIA: ABNORMAL /HPF
BASOPHILS ABSOLUTE: 0.02 E9/L (ref 0–0.2)
BASOPHILS RELATIVE PERCENT: 0.1 % (ref 0–2)
BETA-HYDROXYBUTYRATE: 4.33 MMOL/L (ref 0.02–0.27)
BILIRUB SERPL-MCNC: 0.5 MG/DL (ref 0–1.2)
BILIRUBIN URINE: ABNORMAL
BLOOD, URINE: ABNORMAL
BUN BLDV-MCNC: 32 MG/DL (ref 6–23)
BUN BLDV-MCNC: 34 MG/DL (ref 6–23)
CALCIUM SERPL-MCNC: 10.1 MG/DL (ref 8.6–10.2)
CALCIUM SERPL-MCNC: 9 MG/DL (ref 8.6–10.2)
CALCIUM SERPL-MCNC: 9.7 MG/DL (ref 8.6–10.2)
CALCIUM SERPL-MCNC: 9.7 MG/DL (ref 8.6–10.2)
CHLORIDE BLD-SCNC: 83 MMOL/L (ref 98–107)
CHLORIDE BLD-SCNC: 86 MMOL/L (ref 98–107)
CHLORIDE BLD-SCNC: 93 MMOL/L (ref 98–107)
CHLORIDE BLD-SCNC: 97 MMOL/L (ref 98–107)
CLARITY: CLEAR
CO2: 21 MMOL/L (ref 22–29)
CO2: 23 MMOL/L (ref 22–29)
CO2: 23 MMOL/L (ref 22–29)
CO2: 26 MMOL/L (ref 22–29)
COLOR: YELLOW
CREAT SERPL-MCNC: 1.9 MG/DL (ref 0.7–1.2)
CREAT SERPL-MCNC: 2 MG/DL (ref 0.7–1.2)
CREAT SERPL-MCNC: 2 MG/DL (ref 0.7–1.2)
CREAT SERPL-MCNC: 2.1 MG/DL (ref 0.7–1.2)
EKG ATRIAL RATE: 108 BPM
EKG P AXIS: 19 DEGREES
EKG P-R INTERVAL: 174 MS
EKG Q-T INTERVAL: 376 MS
EKG QRS DURATION: 128 MS
EKG QTC CALCULATION (BAZETT): 503 MS
EKG R AXIS: -96 DEGREES
EKG T AXIS: 112 DEGREES
EKG VENTRICULAR RATE: 108 BPM
EOSINOPHILS ABSOLUTE: 0 E9/L (ref 0.05–0.5)
EOSINOPHILS RELATIVE PERCENT: 0 % (ref 0–6)
GFR SERPL CREATININE-BSD FRML MDRD: 33 ML/MIN/1.73
GFR SERPL CREATININE-BSD FRML MDRD: 35 ML/MIN/1.73
GFR SERPL CREATININE-BSD FRML MDRD: 35 ML/MIN/1.73
GFR SERPL CREATININE-BSD FRML MDRD: 37 ML/MIN/1.73
GLUCOSE BLD-MCNC: 312 MG/DL (ref 74–99)
GLUCOSE BLD-MCNC: 324 MG/DL (ref 74–99)
GLUCOSE BLD-MCNC: 458 MG/DL (ref 74–99)
GLUCOSE BLD-MCNC: 550 MG/DL (ref 74–99)
GLUCOSE URINE: >=1000 MG/DL
HCT VFR BLD CALC: 43.7 % (ref 37–54)
HEMOGLOBIN: 15.3 G/DL (ref 12.5–16.5)
HYALINE CASTS: ABNORMAL /LPF (ref 0–2)
IMMATURE GRANULOCYTES #: 0.09 E9/L
IMMATURE GRANULOCYTES %: 0.5 % (ref 0–5)
KETONES, URINE: 15 MG/DL
LACTIC ACID: 2.1 MMOL/L (ref 0.5–2.2)
LACTIC ACID: 2.7 MMOL/L (ref 0.5–2.2)
LACTIC ACID: 2.9 MMOL/L (ref 0.5–2.2)
LACTIC ACID: 4.6 MMOL/L (ref 0.5–2.2)
LEUKOCYTE ESTERASE, URINE: NEGATIVE
LIPASE: 25 U/L (ref 13–60)
LYMPHOCYTES ABSOLUTE: 1.08 E9/L (ref 1.5–4)
LYMPHOCYTES RELATIVE PERCENT: 6.5 % (ref 20–42)
MAGNESIUM: 1.8 MG/DL (ref 1.6–2.6)
MAGNESIUM: 1.9 MG/DL (ref 1.6–2.6)
MAGNESIUM: 2.1 MG/DL (ref 1.6–2.6)
MCH RBC QN AUTO: 29.9 PG (ref 26–35)
MCHC RBC AUTO-ENTMCNC: 35 % (ref 32–34.5)
MCV RBC AUTO: 85.5 FL (ref 80–99.9)
METER GLUCOSE: 227 MG/DL (ref 74–99)
METER GLUCOSE: 238 MG/DL (ref 74–99)
METER GLUCOSE: 247 MG/DL (ref 74–99)
METER GLUCOSE: 263 MG/DL (ref 74–99)
METER GLUCOSE: 273 MG/DL (ref 74–99)
METER GLUCOSE: 288 MG/DL (ref 74–99)
METER GLUCOSE: 315 MG/DL (ref 74–99)
METER GLUCOSE: 352 MG/DL (ref 74–99)
METER GLUCOSE: 371 MG/DL (ref 74–99)
METER GLUCOSE: 411 MG/DL (ref 74–99)
MONOCYTES ABSOLUTE: 0.9 E9/L (ref 0.1–0.95)
MONOCYTES RELATIVE PERCENT: 5.5 % (ref 2–12)
NEUTROPHILS ABSOLUTE: 14.42 E9/L (ref 1.8–7.3)
NEUTROPHILS RELATIVE PERCENT: 87.4 % (ref 43–80)
NITRITE, URINE: NEGATIVE
PDW BLD-RTO: 13.5 FL (ref 11.5–15)
PH UA: 6 (ref 5–9)
PH VENOUS: 7.51 (ref 7.35–7.45)
PHOSPHORUS: 2.6 MG/DL (ref 2.5–4.5)
PHOSPHORUS: 2.8 MG/DL (ref 2.5–4.5)
PHOSPHORUS: 3 MG/DL (ref 2.5–4.5)
PLATELET # BLD: 332 E9/L (ref 130–450)
PMV BLD AUTO: 10.9 FL (ref 7–12)
POTASSIUM REFLEX MAGNESIUM: 3.5 MMOL/L (ref 3.5–5)
POTASSIUM REFLEX MAGNESIUM: 4.2 MMOL/L (ref 3.5–5)
POTASSIUM SERPL-SCNC: 3.4 MMOL/L (ref 3.5–5)
POTASSIUM SERPL-SCNC: 4.1 MMOL/L (ref 3.5–5)
PROTEIN UA: >=300 MG/DL
RBC # BLD: 5.11 E12/L (ref 3.8–5.8)
RBC UA: ABNORMAL /HPF (ref 0–2)
REASON FOR REJECTION: NORMAL
REASON FOR REJECTION: NORMAL
REJECTED TEST: NORMAL
REJECTED TEST: NORMAL
SODIUM BLD-SCNC: 130 MMOL/L (ref 132–146)
SODIUM BLD-SCNC: 130 MMOL/L (ref 132–146)
SODIUM BLD-SCNC: 134 MMOL/L (ref 132–146)
SODIUM BLD-SCNC: 136 MMOL/L (ref 132–146)
SPECIFIC GRAVITY UA: 1.01 (ref 1–1.03)
TOTAL PROTEIN: 7.2 G/DL (ref 6.4–8.3)
TROPONIN, HIGH SENSITIVITY: 72 NG/L (ref 0–11)
TROPONIN, HIGH SENSITIVITY: 76 NG/L (ref 0–11)
TROPONIN, HIGH SENSITIVITY: 79 NG/L (ref 0–11)
TROPONIN, HIGH SENSITIVITY: 83 NG/L (ref 0–11)
UROBILINOGEN, URINE: 0.2 E.U./DL
WBC # BLD: 16.5 E9/L (ref 4.5–11.5)
WBC UA: ABNORMAL /HPF (ref 0–5)

## 2023-01-26 PROCEDURE — 83735 ASSAY OF MAGNESIUM: CPT

## 2023-01-26 PROCEDURE — 36415 COLL VENOUS BLD VENIPUNCTURE: CPT

## 2023-01-26 PROCEDURE — 87081 CULTURE SCREEN ONLY: CPT

## 2023-01-26 PROCEDURE — 83690 ASSAY OF LIPASE: CPT

## 2023-01-26 PROCEDURE — 2000000000 HC ICU R&B

## 2023-01-26 PROCEDURE — 83605 ASSAY OF LACTIC ACID: CPT

## 2023-01-26 PROCEDURE — 2580000003 HC RX 258: Performed by: EMERGENCY MEDICINE

## 2023-01-26 PROCEDURE — 80048 BASIC METABOLIC PNL TOTAL CA: CPT

## 2023-01-26 PROCEDURE — 96374 THER/PROPH/DIAG INJ IV PUSH: CPT

## 2023-01-26 PROCEDURE — 84100 ASSAY OF PHOSPHORUS: CPT

## 2023-01-26 PROCEDURE — 96375 TX/PRO/DX INJ NEW DRUG ADDON: CPT

## 2023-01-26 PROCEDURE — 6370000000 HC RX 637 (ALT 250 FOR IP): Performed by: EMERGENCY MEDICINE

## 2023-01-26 PROCEDURE — 96372 THER/PROPH/DIAG INJ SC/IM: CPT

## 2023-01-26 PROCEDURE — 71045 X-RAY EXAM CHEST 1 VIEW: CPT

## 2023-01-26 PROCEDURE — 82010 KETONE BODYS QUAN: CPT

## 2023-01-26 PROCEDURE — 6360000002 HC RX W HCPCS: Performed by: EMERGENCY MEDICINE

## 2023-01-26 PROCEDURE — 99285 EMERGENCY DEPT VISIT HI MDM: CPT

## 2023-01-26 PROCEDURE — 93005 ELECTROCARDIOGRAM TRACING: CPT | Performed by: EMERGENCY MEDICINE

## 2023-01-26 PROCEDURE — 96361 HYDRATE IV INFUSION ADD-ON: CPT

## 2023-01-26 PROCEDURE — 2500000003 HC RX 250 WO HCPCS: Performed by: INTERNAL MEDICINE

## 2023-01-26 PROCEDURE — 84484 ASSAY OF TROPONIN QUANT: CPT

## 2023-01-26 PROCEDURE — 74176 CT ABD & PELVIS W/O CONTRAST: CPT

## 2023-01-26 PROCEDURE — 85025 COMPLETE CBC W/AUTO DIFF WBC: CPT

## 2023-01-26 PROCEDURE — 6360000002 HC RX W HCPCS: Performed by: INTERNAL MEDICINE

## 2023-01-26 PROCEDURE — 81001 URINALYSIS AUTO W/SCOPE: CPT

## 2023-01-26 PROCEDURE — 82800 BLOOD PH: CPT

## 2023-01-26 PROCEDURE — 6360000002 HC RX W HCPCS

## 2023-01-26 PROCEDURE — 6370000000 HC RX 637 (ALT 250 FOR IP): Performed by: NURSE PRACTITIONER

## 2023-01-26 PROCEDURE — 82962 GLUCOSE BLOOD TEST: CPT

## 2023-01-26 PROCEDURE — 93005 ELECTROCARDIOGRAM TRACING: CPT

## 2023-01-26 PROCEDURE — 80053 COMPREHEN METABOLIC PANEL: CPT

## 2023-01-26 PROCEDURE — 93010 ELECTROCARDIOGRAM REPORT: CPT | Performed by: INTERNAL MEDICINE

## 2023-01-26 PROCEDURE — 2500000003 HC RX 250 WO HCPCS: Performed by: NURSE PRACTITIONER

## 2023-01-26 RX ORDER — LABETALOL HYDROCHLORIDE 5 MG/ML
10 INJECTION, SOLUTION INTRAVENOUS EVERY 4 HOURS PRN
Status: DISCONTINUED | OUTPATIENT
Start: 2023-01-26 | End: 2023-01-31 | Stop reason: HOSPADM

## 2023-01-26 RX ORDER — DEXTROSE AND SODIUM CHLORIDE 5; .45 G/100ML; G/100ML
INJECTION, SOLUTION INTRAVENOUS CONTINUOUS PRN
Status: DISCONTINUED | OUTPATIENT
Start: 2023-01-26 | End: 2023-01-29

## 2023-01-26 RX ORDER — ONDANSETRON 2 MG/ML
4 INJECTION INTRAMUSCULAR; INTRAVENOUS ONCE
Status: COMPLETED | OUTPATIENT
Start: 2023-01-26 | End: 2023-01-26

## 2023-01-26 RX ORDER — 0.9 % SODIUM CHLORIDE 0.9 %
15 INTRAVENOUS SOLUTION INTRAVENOUS ONCE
Status: COMPLETED | OUTPATIENT
Start: 2023-01-26 | End: 2023-01-26

## 2023-01-26 RX ORDER — PROMETHAZINE HYDROCHLORIDE 25 MG/ML
25 INJECTION, SOLUTION INTRAMUSCULAR; INTRAVENOUS ONCE
Status: COMPLETED | OUTPATIENT
Start: 2023-01-26 | End: 2023-01-26

## 2023-01-26 RX ORDER — SODIUM CHLORIDE 9 MG/ML
INJECTION, SOLUTION INTRAVENOUS CONTINUOUS
Status: DISCONTINUED | OUTPATIENT
Start: 2023-01-26 | End: 2023-01-27

## 2023-01-26 RX ORDER — DIAZEPAM 5 MG/1
5 TABLET ORAL DAILY PRN
Status: CANCELLED | OUTPATIENT
Start: 2023-01-26

## 2023-01-26 RX ORDER — ONDANSETRON 2 MG/ML
INJECTION INTRAMUSCULAR; INTRAVENOUS
Status: COMPLETED
Start: 2023-01-26 | End: 2023-01-26

## 2023-01-26 RX ORDER — DIAZEPAM 5 MG/1
5 TABLET ORAL NIGHTLY PRN
Status: DISCONTINUED | OUTPATIENT
Start: 2023-01-26 | End: 2023-01-31 | Stop reason: HOSPADM

## 2023-01-26 RX ORDER — POTASSIUM CHLORIDE 7.45 MG/ML
10 INJECTION INTRAVENOUS PRN
Status: DISCONTINUED | OUTPATIENT
Start: 2023-01-26 | End: 2023-01-27

## 2023-01-26 RX ORDER — MAGNESIUM SULFATE 1 G/100ML
1000 INJECTION INTRAVENOUS PRN
Status: DISCONTINUED | OUTPATIENT
Start: 2023-01-26 | End: 2023-01-27

## 2023-01-26 RX ORDER — 0.9 % SODIUM CHLORIDE 0.9 %
1000 INTRAVENOUS SOLUTION INTRAVENOUS ONCE
Status: COMPLETED | OUTPATIENT
Start: 2023-01-26 | End: 2023-01-26

## 2023-01-26 RX ORDER — HYDRALAZINE HYDROCHLORIDE 20 MG/ML
10 INJECTION INTRAMUSCULAR; INTRAVENOUS EVERY 6 HOURS PRN
Status: DISCONTINUED | OUTPATIENT
Start: 2023-01-26 | End: 2023-01-27

## 2023-01-26 RX ORDER — FENTANYL CITRATE 50 UG/ML
50 INJECTION, SOLUTION INTRAMUSCULAR; INTRAVENOUS ONCE
Status: COMPLETED | OUTPATIENT
Start: 2023-01-26 | End: 2023-01-26

## 2023-01-26 RX ORDER — METOPROLOL TARTRATE 5 MG/5ML
5 INJECTION INTRAVENOUS EVERY 6 HOURS
Status: DISCONTINUED | OUTPATIENT
Start: 2023-01-26 | End: 2023-01-27

## 2023-01-26 RX ADMIN — TRIMETHOBENZAMIDE HYDROCHLORIDE 200 MG: 100 INJECTION INTRAMUSCULAR at 14:45

## 2023-01-26 RX ADMIN — DIAZEPAM 5 MG: 5 TABLET ORAL at 21:34

## 2023-01-26 RX ADMIN — PROMETHAZINE HYDROCHLORIDE 25 MG: 25 INJECTION INTRAMUSCULAR; INTRAVENOUS at 10:21

## 2023-01-26 RX ADMIN — TRIMETHOBENZAMIDE HYDROCHLORIDE 200 MG: 100 INJECTION INTRAMUSCULAR at 18:13

## 2023-01-26 RX ADMIN — HYDRALAZINE HYDROCHLORIDE 10 MG: 20 INJECTION INTRAMUSCULAR; INTRAVENOUS at 18:12

## 2023-01-26 RX ADMIN — METOPROLOL TARTRATE 5 MG: 5 INJECTION, SOLUTION INTRAVENOUS at 20:16

## 2023-01-26 RX ADMIN — ONDANSETRON 4 MG: 2 INJECTION INTRAMUSCULAR; INTRAVENOUS at 09:30

## 2023-01-26 RX ADMIN — SODIUM CHLORIDE: 9 INJECTION, SOLUTION INTRAVENOUS at 15:40

## 2023-01-26 RX ADMIN — POTASSIUM CHLORIDE 10 MEQ: 7.46 INJECTION, SOLUTION INTRAVENOUS at 14:40

## 2023-01-26 RX ADMIN — DEXTROSE AND SODIUM CHLORIDE: 5; 450 INJECTION, SOLUTION INTRAVENOUS at 17:02

## 2023-01-26 RX ADMIN — ONDANSETRON 4 MG: 2 INJECTION INTRAMUSCULAR; INTRAVENOUS at 16:19

## 2023-01-26 RX ADMIN — POTASSIUM CHLORIDE 10 MEQ: 7.46 INJECTION, SOLUTION INTRAVENOUS at 23:55

## 2023-01-26 RX ADMIN — POTASSIUM CHLORIDE 10 MEQ: 7.46 INJECTION, SOLUTION INTRAVENOUS at 17:04

## 2023-01-26 RX ADMIN — SODIUM CHLORIDE 0.1 UNITS/KG/HR: 9 INJECTION, SOLUTION INTRAVENOUS at 14:45

## 2023-01-26 RX ADMIN — POTASSIUM CHLORIDE 10 MEQ: 7.46 INJECTION, SOLUTION INTRAVENOUS at 19:17

## 2023-01-26 RX ADMIN — POTASSIUM CHLORIDE 10 MEQ: 7.46 INJECTION, SOLUTION INTRAVENOUS at 20:17

## 2023-01-26 RX ADMIN — SODIUM CHLORIDE 1191 ML: 9 INJECTION, SOLUTION INTRAVENOUS at 14:39

## 2023-01-26 RX ADMIN — POTASSIUM CHLORIDE 10 MEQ: 7.46 INJECTION, SOLUTION INTRAVENOUS at 18:20

## 2023-01-26 RX ADMIN — DEXTROSE AND SODIUM CHLORIDE: 5; 450 INJECTION, SOLUTION INTRAVENOUS at 23:51

## 2023-01-26 RX ADMIN — POTASSIUM CHLORIDE 10 MEQ: 7.46 INJECTION, SOLUTION INTRAVENOUS at 23:06

## 2023-01-26 RX ADMIN — POTASSIUM CHLORIDE 10 MEQ: 7.46 INJECTION, SOLUTION INTRAVENOUS at 15:49

## 2023-01-26 RX ADMIN — FENTANYL CITRATE 50 MCG: 50 INJECTION INTRAMUSCULAR; INTRAVENOUS at 11:56

## 2023-01-26 RX ADMIN — LABETALOL HYDROCHLORIDE 10 MG: 5 INJECTION INTRAVENOUS at 16:13

## 2023-01-26 RX ADMIN — SODIUM CHLORIDE 1000 ML: 9 INJECTION, SOLUTION INTRAVENOUS at 09:30

## 2023-01-26 ASSESSMENT — PAIN DESCRIPTION - LOCATION
LOCATION: ABDOMEN
LOCATION: ABDOMEN;HEAD
LOCATION: ABDOMEN

## 2023-01-26 ASSESSMENT — ENCOUNTER SYMPTOMS
NAUSEA: 1
COUGH: 0
SHORTNESS OF BREATH: 0
ABDOMINAL PAIN: 0
BACK PAIN: 0
VOMITING: 1

## 2023-01-26 ASSESSMENT — PAIN SCALES - GENERAL
PAINLEVEL_OUTOF10: 8
PAINLEVEL_OUTOF10: 2
PAINLEVEL_OUTOF10: 8
PAINLEVEL_OUTOF10: 2
PAINLEVEL_OUTOF10: 8
PAINLEVEL_OUTOF10: 10

## 2023-01-26 ASSESSMENT — PAIN - FUNCTIONAL ASSESSMENT
PAIN_FUNCTIONAL_ASSESSMENT: 0-10
PAIN_FUNCTIONAL_ASSESSMENT: 0-10

## 2023-01-26 ASSESSMENT — PAIN DESCRIPTION - ORIENTATION: ORIENTATION: ANTERIOR;RIGHT;LEFT

## 2023-01-26 NOTE — PROGRESS NOTES
Patient admitted from ER to 208, with the following belongings Jacket , shoes, phone, glasses, pants, shirt, belt and socks, placed on monitor, patient oriented to room and unit visiting hours. Patient guide at bedside, reviewed patient rights and responsibilities. MRSA nasal swab obtained. Bed alarm on. Call light within reach. Pt. Alert and oriented follows all commands. Assessment at documented. Pt. Complaining of abdominal pain and nausea since Wednesday. Pt. Medicated. EKG obtained and several IV's were placed and documented. Pt. Is pleasant and cooperative. Plan of care discussed with pt. Pt. Justus lopez.

## 2023-01-26 NOTE — PROGRESS NOTES
Database initiated. Patient is A&O independent from home alone. States he uses no assistive devices and is RA at baseline.

## 2023-01-26 NOTE — ED NOTES
Report called to the unit. No further questions at this time.      Reginaldo Atkinson RN  01/26/23 [id number]

## 2023-01-26 NOTE — ED PROVIDER NOTES
This is a 72-year-old male with a past medical history of diabetes who presents to the ED for evaluation of nausea. Patient states that he ate hall on Tuesday night and states the following day developed nausea and vomiting. Patient states that he has vomited several times and cannot keep anything down including water. Patient states that he has no abdominal pain or diarrhea. Patient states that he feels very dry and needs water. Patient states he has been taking his insulin as described states his blood sugars have been within normal limits. Patient has no ill contacts. No other reported mitigating or exacerbating factors. The history is provided by the patient. Review of Systems   Constitutional:  Positive for appetite change. Negative for fever. HENT:  Negative for congestion. Eyes:  Negative for visual disturbance. Respiratory:  Negative for cough and shortness of breath. Cardiovascular:  Negative for chest pain. Gastrointestinal:  Positive for nausea and vomiting. Negative for abdominal pain. Endocrine: Negative for polyuria. Genitourinary:  Negative for dysuria. Musculoskeletal:  Negative for back pain. Skin:  Negative for rash. Allergic/Immunologic: Negative for immunocompromised state. Neurological:  Negative for headaches. Hematological:  Does not bruise/bleed easily. Psychiatric/Behavioral:  Negative for confusion. Physical Exam  Vitals and nursing note reviewed. Constitutional:       General: He is not in acute distress. Appearance: He is well-developed. HENT:      Head: Normocephalic and atraumatic. Mouth/Throat:      Mouth: Mucous membranes are dry. Eyes:      Extraocular Movements: Extraocular movements intact. Pupils: Pupils are equal, round, and reactive to light. Neck:      Vascular: No JVD. Cardiovascular:      Rate and Rhythm: Normal rate and regular rhythm.    Pulmonary:      Effort: Pulmonary effort is normal.      Breath sounds: No wheezing, rhonchi or rales. Chest:      Chest wall: No tenderness. Abdominal:      General: There is no distension. Palpations: Abdomen is soft. Tenderness: There is no abdominal tenderness. There is no guarding or rebound. Hernia: No hernia is present. Musculoskeletal:      Cervical back: Normal range of motion and neck supple. Right lower leg: No edema. Left lower leg: No edema. Skin:     General: Skin is warm and dry. Capillary Refill: Capillary refill takes less than 2 seconds. Neurological:      General: No focal deficit present. Mental Status: He is alert and oriented to person, place, and time. Cranial Nerves: No cranial nerve deficit. Psychiatric:         Mood and Affect: Mood normal.         Behavior: Behavior normal.        Procedures     MDM  Number of Diagnoses or Management Options  Diabetic ketoacidosis without coma associated with other specified diabetes mellitus ([facility] [address]  Diagnosis management comments: Patient is a pleasant 68-year-old male who presented to the ED for several days of nausea and vomiting patient a broad differential which included was not limited to sepsis, gastroenteritis as well as DKA to name a few of the many. Patient was not hypoxic slightly tachycardic but responded well to IV fluids. Patient was found to have an large anion gap as well as elevated beta hydroxybutyrate and ketones in his urine which was concerning for DKA. Patient did have leukocytosis but no open wounds no obvious source of any infectious process requiring antibiotics at this time.   Patient's lactic acid improved after fluids however he continued be nauseous given the patient's need for constant monitoring and insulin infusion he was admitted to the ICU for further evaluation monitoring                 Differential diagnosis: GE, Abscess, Sepsis, UTI, DKA  Review of ED/ Outpatient Records: Reviewed Dr. Nat Dunn previous admission in 2021  Historians that case was discussed with: None  Imaging interpretation by myself: CXR showed no signs of PNA, Effusion or opacities  Independent Interpretation of labs: Elevated Beta Hydroxy, elevated anion gap  Discussed with other providers: Dr. Ander Lima, Dr Ethan Anderson  Tests Considered but not ordered: None  Decision making tools/risks stratification: None  Disposition decision making/shared decision making: Shared  Chronic Conditions affecting care: DM  Social Determinants of health impacting treatment or disposition: None  CODE status and Discussions: Full                    --------------------------------------------- PAST HISTORY ---------------------------------------------  Past Medical History:  has a past medical history of Dementia ([facility] [address] Depression, Diabetes mellitus ([facility] [address] Hypertension, and Neuropathy. 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 Social History:  reports that he has been smoking. He has been smoking an average of .25 packs per day. He has never used smokeless tobacco. He reports that he does not drink alcohol and does not use drugs. Family History: family history is not on file. The patients home medications have been reviewed. Allergies: Patient has no known allergies.     -------------------------------------------------- RESULTS -------------------------------------------------    LABS:  Results for orders placed or performed during the hospital encounter of 01/26/23   Comprehensive Metabolic Panel w/ Reflex to MG   Result Value Ref Range    Sodium 130 (L) 132 - 146 mmol/L    Potassium reflex Magnesium 4.2 3.5 - 5.0 mmol/L    Chloride 83 (L) 98 - 107 mmol/L    CO2 23 22 - 29 mmol/L    Anion Gap 24 (H) 7 - 16 mmol/L    Glucose 550 (HH) 74 - 99 mg/dL    BUN 34 (H) 6 - 23 mg/dL    Creatinine 2.1 (H) 0.7 - 1.2 mg/dL    Est, Glom Filt Rate 33 >=60 mL/min/1.73    Calcium 10.1 8.6 - 10.2 mg/dL    Total Protein 7.2 6.4 - 8.3 g/dL    Albumin 3.8 3.5 - 5.2 g/dL    Total Bilirubin 0.5 0.0 - 1.2 mg/dL    Alkaline Phosphatase 85 40 - 129 U/L    ALT 20 0 - 40 U/L    AST 41 (H) 0 - 39 U/L   CBC with Auto Differential   Result Value Ref Range    WBC 16.5 (H) 4.5 - 11.5 E9/L    RBC 5.11 3.80 - 5.80 E12/L    Hemoglobin 15.3 12.5 - 16.5 g/dL    Hematocrit 43.7 37.0 - 54.0 %    MCV 85.5 80.0 - 99.9 fL    MCH 29.9 26.0 - 35.0 pg    MCHC 35.0 (H) 32.0 - 34.5 %    RDW 13.5 11.5 - 15.0 fL    Platelets 722 199 - 837 E9/L    MPV 10.9 7.0 - 12.0 fL    Neutrophils % 87.4 (H) 43.0 - 80.0 %    Immature Granulocytes % 0.5 0.0 - 5.0 %    Lymphocytes % 6.5 (L) 20.0 - 42.0 %    Monocytes % 5.5 2.0 - 12.0 %    Eosinophils % 0.0 0.0 - 6.0 %    Basophils % 0.1 0.0 - 2.0 %    Neutrophils Absolute 14.42 (H) 1.80 - 7.30 E9/L    Immature Granulocytes # 0.09 E9/L    Lymphocytes Absolute 1.08 (L) 1.50 - 4.00 E9/L    Monocytes Absolute 0.90 0.10 - 0.95 E9/L    Eosinophils Absolute 0.00 (L) 0.05 - 0.50 E9/L    Basophils Absolute 0.02 0.00 - 0.20 E9/L   Lipase   Result Value Ref Range    Lipase 25 13 - 60 U/L   Lactic Acid   Result Value Ref Range    Lactic Acid 4.6 (HH) 0.5 - 2.2 mmol/L   Urinalysis with Microscopic   Result Value Ref Range    Color, UA Yellow Straw/Yellow    Clarity, UA Clear Clear    Glucose, Ur >=1000 (A) Negative mg/dL    Bilirubin Urine SMALL (A) Negative    Ketones, Urine 15 (A) Negative mg/dL    Specific Gravity, UA 1.015 1.005 - 1.030    Blood, Urine LARGE (A) Negative    pH, UA 6.0 5.0 - 9.0    Protein, UA >=300 (A) Negative mg/dL    Urobilinogen, Urine 0.2 <2.0 E.U./dL    Nitrite, Urine Negative Negative    Leukocyte Esterase, Urine Negative Negative    Hyaline Casts, UA 0-2 0 - 2 /LPF    WBC, UA 0-1 0 - 5 /HPF    RBC, UA 5-10 (A) 0 - 2 /HPF    Bacteria, UA RARE (A) None Seen /HPF   pH, venous   Result Value Ref Range    pH, Carmelo 7.51 (H) 7.35 - 7.45   Beta-Hydroxybutyrate   Result Value Ref Range Beta-Hydroxybutyrate 4.33 (H) 0.02 - 0.27 mmol/L   SPECIMEN REJECTION   Result Value Ref Range    Rejected Test TRP5     Reason for Rejection see below    Troponin   Result Value Ref Range    Troponin, High Sensitivity 76 (H) 0 - 11 ng/L   Troponin   Result Value Ref Range    Troponin, High Sensitivity 72 (H) 0 - 11 ng/L   Lactic Acid   Result Value Ref Range    Lactic Acid 2.7 (H) 0.5 - 2.2 mmol/L   Basic Metabolic Panel w/ Reflex to MG   Result Value Ref Range    Sodium 130 (L) 132 - 146 mmol/L    Potassium reflex Magnesium 3.5 3.5 - 5.0 mmol/L    Chloride 86 (L) 98 - 107 mmol/L    CO2 21 (L) 22 - 29 mmol/L    Anion Gap 23 (H) 7 - 16 mmol/L    Glucose 458 (H) 74 - 99 mg/dL    BUN 34 (H) 6 - 23 mg/dL    Creatinine 2.0 (H) 0.7 - 1.2 mg/dL    Est, Glom Filt Rate 35 >=60 mL/min/1.73    Calcium 9.7 8.6 - 10.2 mg/dL   Magnesium   Result Value Ref Range    Magnesium 2.1 1.6 - 2.6 mg/dL   Phosphorus   Result Value Ref Range    Phosphorus 2.6 2.5 - 4.5 mg/dL   Basic Metabolic Panel   Result Value Ref Range    Sodium 136 132 - 146 mmol/L    Potassium 3.4 (L) 3.5 - 5.0 mmol/L    Chloride 93 (L) 98 - 107 mmol/L    CO2 26 22 - 29 mmol/L    Anion Gap 17 (H) 7 - 16 mmol/L    Glucose 324 (H) 74 - 99 mg/dL    BUN 34 (H) 6 - 23 mg/dL    Creatinine 2.0 (H) 0.7 - 1.2 mg/dL    Est, Glom Filt Rate 35 >=60 mL/min/1.73    Calcium 9.7 8.6 - 10.2 mg/dL   Basic Metabolic Panel   Result Value Ref Range    Sodium 134 132 - 146 mmol/L    Potassium 3.2 (L) 3.5 - 5.0 mmol/L    Chloride 97 (L) 98 - 107 mmol/L    CO2 25 22 - 29 mmol/L    Anion Gap 12 7 - 16 mmol/L    Glucose 257 (H) 74 - 99 mg/dL    BUN 31 (H) 6 - 23 mg/dL    Creatinine 1.9 (H) 0.7 - 1.2 mg/dL    Est, Glom Filt Rate 37 >=60 mL/min/1.73    Calcium 8.9 8.6 - 10.2 mg/dL   Magnesium   Result Value Ref Range    Magnesium 1.9 1.6 - 2.6 mg/dL   Magnesium   Result Value Ref Range    Magnesium 1.8 1.6 - 2.6 mg/dL   Phosphorus   Result Value Ref Range    Phosphorus 3.0 2.5 - 4.5 mg/dL   Phosphorus   Result Value Ref Range    Phosphorus 2.4 (L) 2.5 - 4.5 mg/dL   Basic Metabolic Panel   Result Value Ref Range    Sodium 135 132 - 146 mmol/L    Potassium 4.1 3.5 - 5.0 mmol/L    Chloride 99 98 - 107 mmol/L    CO2 27 22 - 29 mmol/L    Anion Gap 9 7 - 16 mmol/L    Glucose 108 (H) 74 - 99 mg/dL    BUN 30 (H) 6 - 23 mg/dL    Creatinine 1.8 (H) 0.7 - 1.2 mg/dL    Est, Glom Filt Rate 40 >=60 mL/min/1.73    Calcium 8.7 8.6 - 10.2 mg/dL   Magnesium   Result Value Ref Range    Magnesium 1.8 1.6 - 2.6 mg/dL   Phosphorus   Result Value Ref Range    Phosphorus 3.0 2.5 - 4.5 mg/dL   Lactic Acid   Result Value Ref Range    Lactic Acid 2.9 (H) 0.5 - 2.2 mmol/L   Lactic Acid   Result Value Ref Range    Lactic Acid 2.1 0.5 - 2.2 mmol/L   Lactic Acid   Result Value Ref Range    Lactic Acid 1.9 0.5 - 2.2 mmol/L   Lactic Acid   Result Value Ref Range    Lactic Acid 1.3 0.5 - 2.2 mmol/L   Troponin   Result Value Ref Range    Troponin, High Sensitivity 79 (H) 0 - 11 ng/L   Troponin   Result Value Ref Range    Troponin, High Sensitivity 77 (H) 0 - 11 ng/L   Troponin   Result Value Ref Range    Troponin, High Sensitivity 77 (H) 0 - 11 ng/L   SPECIMEN REJECTION   Result Value Ref Range    Rejected Test bmp mg phos trp     Reason for Rejection see below    Basic Metabolic Panel   Result Value Ref Range    Sodium 134 132 - 146 mmol/L    Potassium 4.1 3.5 - 5.0 mmol/L    Chloride 97 (L) 98 - 107 mmol/L    CO2 23 22 - 29 mmol/L    Anion Gap 14 7 - 16 mmol/L    Glucose 312 (H) 74 - 99 mg/dL    BUN 32 (H) 6 - 23 mg/dL    Creatinine 1.9 (H) 0.7 - 1.2 mg/dL    Est, Glom Filt Rate 37 >=60 mL/min/1.73    Calcium 9.0 8.6 - 10.2 mg/dL   Magnesium   Result Value Ref Range    Magnesium 1.8 1.6 - 2.6 mg/dL   Phosphorus   Result Value Ref Range    Phosphorus 2.8 2.5 - 4.5 mg/dL   Troponin   Result Value Ref Range    Troponin, High Sensitivity 83 (H) 0 - 11 ng/L   CBC with Auto Differential   Result Value Ref Range    WBC 19.2 (H) 4.5 - 11.5  E9/L    RBC 4.39 3.80 - 5.80 E12/L    Hemoglobin 13.1 12.5 - 16.5 g/dL    Hematocrit 38.8 37.0 - 54.0 %    MCV 88.4 80.0 - 99.9 fL    MCH 29.8 26.0 - 35.0 pg    MCHC 33.8 32.0 - 34.5 %    RDW 13.6 11.5 - 15.0 fL    Platelets 178 258 - 893 E9/L    MPV 10.5 7.0 - 12.0 fL    Neutrophils % 83.9 (H) 43.0 - 80.0 %    Immature Granulocytes % 0.5 0.0 - 5.0 %    Lymphocytes % 5.7 (L) 20.0 - 42.0 %    Monocytes % 9.7 2.0 - 12.0 %    Eosinophils % 0.1 0.0 - 6.0 %    Basophils % 0.1 0.0 - 2.0 %    Neutrophils Absolute 16.15 (H) 1.80 - 7.30 E9/L    Immature Granulocytes # 0.10 E9/L    Lymphocytes Absolute 1.10 (L) 1.50 - 4.00 E9/L    Monocytes Absolute 1.86 (H) 0.10 - 0.95 E9/L    Eosinophils Absolute 0.01 (L) 0.05 - 0.50 E9/L    Basophils Absolute 0.02 0.00 - 0.20 E9/L    Polychromasia 1+    POCT Glucose   Result Value Ref Range    Meter Glucose 411 (H) 74 - 99 mg/dL   POCT Glucose   Result Value Ref Range    Meter Glucose 371 (H) 74 - 99 mg/dL   POCT Glucose   Result Value Ref Range    Meter Glucose 315 (H) 74 - 99 mg/dL   POCT Glucose   Result Value Ref Range    Meter Glucose 238 (H) 74 - 99 mg/dL   POCT Glucose   Result Value Ref Range    Meter Glucose 247 (H) 74 - 99 mg/dL   POCT Glucose   Result Value Ref Range    Meter Glucose 263 (H) 74 - 99 mg/dL   POCT Glucose   Result Value Ref Range    Meter Glucose 227 (H) 74 - 99 mg/dL   POCT Glucose   Result Value Ref Range    Meter Glucose 273 (H) 74 - 99 mg/dL   POCT Glucose   Result Value Ref Range    Meter Glucose 288 (H) 74 - 99 mg/dL   POCT Glucose   Result Value Ref Range    Meter Glucose 352 (H) 74 - 99 mg/dL   POCT Glucose   Result Value Ref Range    Meter Glucose 280 (H) 74 - 99 mg/dL   POCT Glucose   Result Value Ref Range    Meter Glucose 129 (H) 74 - 99 mg/dL   POCT Glucose   Result Value Ref Range    Meter Glucose 98 74 - 99 mg/dL   POCT Glucose   Result Value Ref Range    Meter Glucose 81 74 - 99 mg/dL   POCT Glucose   Result Value Ref Range    Meter Glucose 422 (H) 74 - 99 mg/dL   POCT Glucose   Result Value Ref Range    Meter Glucose 111 (H) 74 - 99 mg/dL   POCT Glucose   Result Value Ref Range    Meter Glucose 184 (H) 74 - 99 mg/dL   POCT Glucose   Result Value Ref Range    Meter Glucose 160 (H) 74 - 99 mg/dL   POCT Glucose   Result Value Ref Range    Meter Glucose 193 (H) 74 - 99 mg/dL   EKG 12 Lead   Result Value Ref Range    Ventricular Rate 108 BPM    Atrial Rate 108 BPM    P-R Interval 174 ms    QRS Duration 128 ms    Q-T Interval 376 ms    QTc Calculation (Bazett) 503 ms    P Axis 19 degrees    R Axis -96 degrees    T Axis 112 degrees   EKG 12 Lead   Result Value Ref Range    Ventricular Rate 90 BPM    Atrial Rate 90 BPM    P-R Interval 160 ms    QRS Duration 122 ms    Q-T Interval 440 ms    QTc Calculation (Bazett) 538 ms    P Axis 12 degrees    R Axis -79 degrees    T Axis 125 degrees       RADIOLOGY:  CT ABDOMEN PELVIS WO CONTRAST Additional Contrast? None   Final Result   No acute abnormality. XR CHEST PORTABLE   Final Result   No acute process. ------------------------- NURSING NOTES AND VITALS REVIEWED ---------------------------  Date / Time Roomed:  1/26/2023  9:03 AM  ED Bed Assignment:  [id number]    The nursing notes within the ED encounter and vital signs as below have been reviewed.      Patient Vitals for the past 24 hrs:   BP Temp Temp src Pulse Resp SpO2 Height Weight   01/27/23 0422 -- -- -- -- -- -- -- 163 lb 2.3 oz (74 kg)   01/27/23 0400 (!) 193/97 98.9 °F (37.2 °C) Oral 87 18 96 % -- --   01/27/23 0300 (!) 187/99 -- -- 79 (!) 8 -- -- --   01/27/23 0200 (!) 195/116 98.8 °F (37.1 °C) Oral 96 17 97 % -- --   01/27/23 0100 (!) 184/88 -- -- 97 15 -- -- --   01/27/23 0000 (!) 166/99 98.9 °F (37.2 °C) Oral 97 17 98 % -- --   01/26/23 2300 (!) 196/106 -- -- (!) 105 15 100 % -- --   01/26/23 2200 -- 98.8 °F (37.1 °C) Oral 87 15 97 % -- --   01/26/23 2100 (!) 180/102 -- -- 86 18 95 % -- --   01/26/23 2000 (!) 171/84 98.9 °F (37.2 °C) Oral (!) 103 18 97 % -- --   01/26/23 1900 (!) 177/92 -- -- (!) 105 19 97 % -- --   01/26/23 1827 -- -- -- (!) 102 25 -- -- --   01/26/23 1800 (!) 169/94 -- -- (!) 105 17 100 % -- --   01/26/23 1700 (!) 182/98 -- -- 88 14 98 % -- --   01/26/23 1600 (!) 212/112 99 °F (37.2 °C) -- (!) 115 16 96 % -- --   01/26/23 1530 -- -- -- (!) 116 23 97 % 5' 7\" (1.702 m) 159 lb 13.3 oz (72.5 kg)   01/26/23 1515 (!) 189/102 99 °F (37.2 °C) Oral (!) 117 -- -- -- --   01/26/23 1406 -- -- -- -- -- -- -- 175 lb (79.4 kg)   01/26/23 1340 (!) 186/105 -- -- (!) 115 18 98 % -- --   01/26/23 1144 (!) 180/102 98.2 °F (36.8 °C) Oral (!) 112 14 98 % -- --   01/26/23 0901 129/78 98.1 °F (36.7 °C) -- (!) 102 18 100 % -- --       Oxygen Saturation Interpretation: Normal    ------------------------------------------ PROGRESS NOTES ------------------------------------------  Re-evaluation(s):  Time: 103  Patients symptoms are improving  Repeat physical examination is improved    Counseling:  I have spoken with the patient and discussed todays results, in addition to providing specific details for the plan of care and counseling regarding the diagnosis and prognosis. Their questions are answered at this time and they are agreeable with the plan of admission.    --------------------------------- ADDITIONAL PROVIDER NOTES ---------------------------------  Consultations:  Spoke with Dr. Elise Can Discussed case. They will admit the patient. This patient's ED course included: a personal history and physicial examination, re-evaluation prior to disposition, multiple bedside re-evaluations, IV medications, cardiac monitoring, continuous pulse oximetry, and complex medical decision making and emergency management    This patient has remained hemodynamically stable during their ED course.     Please note that the withdrawal or failure to initiate urgent interventions for this patient would likely result in a life threatening deterioration or permanent disability. Systems at risk for deterioration include: Cv/PUlm    Accordingly this patient received 34 minutes of critical care time, excluding separately billable procedures. Diagnosis:  1. Diabetic ketoacidosis without coma associated with other specified diabetes mellitus ([facility]        Disposition:  Patient's disposition: Admit to CCU/ICU  Patient's condition is stable.             Yanira Islas DO  01/27/23 9937

## 2023-01-26 NOTE — CONSULTS
Critical Care Admit/Consult Note         Patient - Cisco Maldonado   MRN -  [id number]   Acct # - [de-identified]   - 1950      Date of Admission -  2023  9:03 AM  Date of evaluation -  2023  0208/0208-A   Hospital Day - 0            ADMIT/CONSULT DETAILS     Reason for Admit/Consult   Diabetic ketoacidosis    Consulting Service/Physician   Consulting - Vasquez Andrade DO  Primary Care Physician - Vasquez Andrade,      ICU attending - Dr. Frida CALLEJAS   The patient is a 81 y.o. male with significant past medical history of type 1 diabetes that is controlled with insulin, metformin, and Januvia, dementia, hypertension, peripheral neuropathy and surgical history of debridement to his left great toe secondary to osteomyelitis. Patient was into the emergency department 2023 for multiple episodes of emesis since yesterday. He reports he believes he had approximately 50 episodes of emesis and is actively nauseous here. He reports that he ate some bad hall from [employer] and believes that this is the cause of his nausea and vomiting. He denies fever, chills, chest pain, shortness of breath, dysuria, hematuria, hematochezia, and melena. In emergency department, patient was found to be in severe dehydration with-anion gap of 24 and bicarb of 23 initially, repeat labs after 1 L of fluid shows the bicarb decreased to 21 and still had an anion gap of 23 concerning for developing diabetic ketoacidosis. Patient was started on insulin drip and bolused additional IV fluids emergency department and accepted to the ICU.            Awake and following commands: yes  Current Ventilation: - No ventilator support  Secretions: n/a  Sedation: none  Paralyzed: No  Vasopressors: None    Past Medical History         Diagnosis Date    Dementia ([facility] [address]     Depression     Diabetes mellitus ([facility] [address]     Hypertension     Neuropathy         Past Surgical History           Procedure Laterality Date    BACK SURGERY      FOOT DEBRIDEMENT Left 3/25/2021    BONE BIOPSY AND CULTURE, GREAT TOE LEFT FOOT performed by Chito Stringer DPM at [address] Left 3/28/2021    LEFT FOOT TOE AMPUTATION,  LEFT FOOT PARTIAL HALLUX AMPUTATION WITH DEBRIDEMENT OF NON VIABLE TISSUE performed by Chito Stringer DPM at [facility]:    Social History       Tobacco History       Smoking Status  Some Days Smoking Frequency  0.25 packs/day Smoking Tobacco Type  Cigarettes      Smokeless Tobacco Use  Never                    Current Medications   Current Medications     dextrose bolus **OR** dextrose bolus, potassium chloride, magnesium sulfate, sodium phosphate IVPB **OR** sodium phosphate IVPB **OR** sodium phosphate IVPB, dextrose 5 % and 0.45 % NaCl, labetalol, hydrALAZINE  IV Drips/Infusions   sodium chloride      dextrose 5 % and 0.45 % NaCl      insulin 0.1 Units/kg/hr (01/26/23 2845)     Home Medications  Medications Prior to Admission: insulin glargine (LANTUS) 100 UNIT/ML injection vial, Inject 30 Units into the skin nightly  diazePAM (VALIUM) 5 MG tablet, Take 5 mg by mouth daily as needed for Anxiety.   aspirin 81 MG EC tablet, Take 81 mg by mouth every morning  SITagliptin (JANUVIA) 50 MG tablet, Take 50 mg by mouth Daily with lunch   metFORMIN (GLUCOPHAGE) 500 MG tablet, Take 500 mg by mouth 2 times daily (with meals)  sertraline (ZOLOFT) 50 MG tablet, Take 50 mg by mouth every morning  cloNIDine (CATAPRES) 0.2 MG tablet, Take 1 tablet by mouth 2 times daily  Ergocalciferol (VITAMIN D2) 2000 units TABS, Take 2,000 Units by mouth Daily with lunch   hydrOXYzine (ATARAX) 25 MG tablet, Take 25 mg by mouth nightly  magnesium oxide (MAG-OX) 400 (240 Mg) MG tablet, Take 1 tablet by mouth 2 times daily  famotidine (PEPCID) 20 MG tablet, Take 1 tablet by mouth 2 times daily  glipiZIDE (GLUCOTROL) 10 MG tablet, Take 10 mg by mouth 2 times daily (before meals)  rosuvastatin (CRESTOR) 40 MG tablet, Take 40 mg by mouth nightly   fenofibrate (TRICOR) 145 MG tablet, Take 145 mg by mouth every morning    Diet/Nutrition   Diet NPO    Allergies   Patient has no known allergies. Social History   Tobacco   reports that he has been smoking. He has been smoking an average of .25 packs per day. He has never used smokeless tobacco.    Alcohol     reports no history of alcohol use. Occupational history/exposure? Family History   No family history on file. ROS   Review of Systems    Mechanical Ventilation Data   VENT SETTINGS (Comprehensive)     Additional Respiratory Assessments  Heart Rate: (!) 115  Resp: 18  SpO2: 98 %    ABG  No results for input(s): PH, PCO2, PO2, HCO3, BE, O2SAT in the last 72 hours. No results found for: PH, PCO2, PO2, HCO3, O2SAT  No results found for: IFIO2, MODE, SETTIDVOL, SETPEEP    Vitals    height is 5' 7\" (1.702 m) and weight is 159 lb 13.3 oz (72.5 kg). His oral temperature is 99 °F (37.2 °C). His blood pressure is 189/102 (abnormal) and his pulse is 115 (abnormal). His respiration is 18 and oxygen saturation is 98%.        Temperature Range: Temp: 99 °F (37.2 °C) Temp  Av.4 °F (36.9 °C)  Min: 98.1 °F (36.7 °C)  Max: 99 °F (37.2 °C)  BP Range:  Systolic (02DLD), DYY:955 , Min:129 , VWN:527     Diastolic (35OUY), TKR:08, Min:78, Max:105    Pulse Range: Pulse  Av.7  Min: 102  Max: 115  Respiration Range: Resp  Av.7  Min: 14  Max: 18  Current Pulse Ox[de-identified]  SpO2: 98 %  24HR Pulse Ox Range:  SpO2  Av.7 %  Min: 98 %  Max: 100 %  Oxygen Amount and Delivery:      I/O (24 Hours)  Patient Vitals for the past 8 hrs:   BP Temp Temp src Pulse Resp SpO2 Height Weight   23 1530 -- -- -- -- -- -- 5' 7\" (1.702 m) 159 lb 13.3 oz (72.5 kg)   23 1515 (!) 189/102 99 °F (37.2 °C) Oral -- -- -- -- --   23 1406 -- -- -- -- -- -- -- 175 lb (79.4 kg)   23 1340 (!) 186/105 -- -- (!) 115 18 98 % -- --   23 1144 (!) 180/102 98.2 °F (36.8 °C) Oral (!) 112 14 98 % -- -- 01/26/23 0901 129/78 98.1 °F (36.7 °C) -- (!) 102 18 100 % -- --     No intake or output data in the 24 hours ending 01/26/23 1602  No intake/output data recorded. Patient Vitals for the past 96 hrs (Last 3 readings):   Weight   01/26/23 1530 159 lb 13.3 oz (72.5 kg)   01/26/23 1406 175 lb (79.4 kg)       Exam     Constitutional/General: Alert and oriented x3  Head: Normocephalic and atraumatic  Eyes: PERRL, EOMI, conjunctiva normal, sclera non icteric  ENT:  Oropharynx clear, handling secretions, no trismus, no asymmetry of the posterior oropharynx or uvular edema  Neck: Supple, full ROM, no stridor, no meningeal signs  Respiratory: Lungs clear to auscultation bilaterally, no wheezes, rales, or rhonchi. Not in respiratory distress  Cardiovascular:  Regular rate. Regular rhythm. No murmurs, no gallops, no rubs. 2+ distal pulses. Equal extremity pulses. Chest: No chest wall tenderness  GI:  Abdomen Soft, mild generally tender, Non distended. +BS. No rebound, guarding, or rigidity. No pulsatile masses. Musculoskeletal: Moves all extremities x 4. Warm and well perfused, no clubbing, no cyanosis, no edema. Capillary refill <3 seconds. Status post left great big toe amputation that is partial, there is no evidence of wounds to his feet bilaterally. Integument: skin warm and dry. No rashes.    Neurologic: GCS 15, no focal deficits, symmetric strength 5/5 in the upper and lower extremities bilaterally  Psychiatric: Normal Affect    Data   Old records and images have been reviewed    Lab Results   CBC     Lab Results   Component Value Date/Time    WBC 16.5 01/26/2023 09:23 AM    RBC 5.11 01/26/2023 09:23 AM    HGB 15.3 01/26/2023 09:23 AM    HCT 43.7 01/26/2023 09:23 AM     01/26/2023 09:23 AM    MCV 85.5 01/26/2023 09:23 AM    MCH 29.9 01/26/2023 09:23 AM    MCHC 35.0 01/26/2023 09:23 AM    RDW 13.5 01/26/2023 09:23 AM    LYMPHOPCT 6.5 01/26/2023 09:23 AM    MONOPCT 5.5 01/26/2023 09:23 AM    BASOPCT 0.1 01/26/2023 09:23 AM    MONOSABS 0.90 01/26/2023 09:23 AM    LYMPHSABS 1.08 01/26/2023 09:23 AM    EOSABS 0.00 01/26/2023 09:23 AM    BASOSABS 0.02 01/26/2023 09:23 AM       BMP   Lab Results   Component Value Date/Time     01/26/2023 12:33 PM    K 3.5 01/26/2023 12:33 PM    CL 86 01/26/2023 12:33 PM    CO2 21 01/26/2023 12:33 PM    BUN 34 01/26/2023 12:33 PM    CREATININE 2.0 01/26/2023 12:33 PM    GLUCOSE 458 01/26/2023 12:33 PM    CALCIUM 9.7 01/26/2023 12:33 PM       LFTS  Lab Results   Component Value Date/Time    ALKPHOS 85 01/26/2023 09:23 AM    ALT 20 01/26/2023 09:23 AM    AST 41 01/26/2023 09:23 AM    PROT 7.2 01/26/2023 09:23 AM    BILITOT 0.5 01/26/2023 09:23 AM    LABALBU 3.8 01/26/2023 09:23 AM       INR  No results for input(s): PROTIME, INR in the last 72 hours. APTT  No results for input(s): APTT in the last 72 hours. Lactic Acid  Lab Results   Component Value Date/Time    LACTA 2.7 01/26/2023 12:33 PM    LACTA 4.6 01/26/2023 09:23 AM    LACTA 1.9 03/24/2021 01:37 PM        BNP   No results for input(s): BNP in the last 72 hours. Cultures     No results for input(s): BC in the last 72 hours. No results for input(s): Letta Banco in the last 72 hours. No results for input(s): LABURIN in the last 72 hours. Radiology     Portable Chest XR  Impression   No acute process.                  SYSTEMS ASSESSMENT    Neuro  No acute process    Respiratory  No acute process    Cardiovascular  Elevated troponin  76 delta 72 in the ED  Repeating EKG and troponin in the ICU  Hypertension  Unable to take meds secondary to nausea and emesis  Labetalol 10 mg every 6 as needed, labetalol 10 mg as needed every 4 hours  Goal SBP below 170    GI  Nausea vomiting in the presence of diabetic ketoacidosis  NPO pending closure of anion gap  Tigan as needed for nausea,  ms      Renal  Stage II GENIA likely secondary to poor oral intake  Baseline creatinine 1.2  CR 2.0 (1/26)  Trend BMPs  Strict I's and O's  Thor secondary to DKA  Anion gap 23  Continue IV  mL/hr  Lactic 2.7, will trend      Infectious disease  No acute issues    Heme/Onc  No acute issues    Endocrine  Diabetic ketoacidosis likely secondary to nausea/vomiting  Continue IV hydration  Continue insulin drip until gap closed             Social/Spiritual/DNR/Other  Code status: Full code  Diet Diet NPO  Stress ulcer prophylaxis: None  DVT prophylaxis: None  Consultations needed: No  Transfer out of ICU today? No    Lines/catheters  date  Peripheral line to left antecubital 1/26        Esther Armendariz DO  4:02 PM  01/26/23        I personally saw, examined and provided care for the patient. Radiographs, labs and medication list were reviewed by me independently. Review of Residents documentation was conducted and revisions were made as appropriate. I agree with the above documented exam, problem list and plan of care. Remains critically ill from DKA, lactic acidosis. Patient with nausea vomiting dehydration with diabetes. Continue resuscitation along with DKA protocol.     CCT excluding procedures 34 minutes    Wilfrido Harrison DO

## 2023-01-27 DIAGNOSIS — I49.9 IRREGULAR CARDIAC RHYTHM: Primary | ICD-10-CM

## 2023-01-27 DIAGNOSIS — R00.0 TACHYCARDIA: Primary | ICD-10-CM

## 2023-01-27 PROBLEM — E11.10 DIABETIC ACIDOSIS WITHOUT COMA (HCC): Status: ACTIVE | Noted: 2023-01-27

## 2023-01-27 LAB
ADENOVIRUS BY PCR: NOT DETECTED
ANION GAP SERPL CALCULATED.3IONS-SCNC: 12 MMOL/L (ref 7–16)
ANION GAP SERPL CALCULATED.3IONS-SCNC: 13 MMOL/L (ref 7–16)
ANION GAP SERPL CALCULATED.3IONS-SCNC: 9 MMOL/L (ref 7–16)
BASOPHILS ABSOLUTE: 0.02 E9/L (ref 0–0.2)
BASOPHILS RELATIVE PERCENT: 0.1 % (ref 0–2)
BORDETELLA PARAPERTUSSIS BY PCR: NOT DETECTED
BORDETELLA PERTUSSIS BY PCR: NOT DETECTED
BUN BLDV-MCNC: 27 MG/DL (ref 6–23)
BUN BLDV-MCNC: 30 MG/DL (ref 6–23)
BUN BLDV-MCNC: 31 MG/DL (ref 6–23)
CALCIUM SERPL-MCNC: 8.4 MG/DL (ref 8.6–10.2)
CALCIUM SERPL-MCNC: 8.7 MG/DL (ref 8.6–10.2)
CALCIUM SERPL-MCNC: 8.9 MG/DL (ref 8.6–10.2)
CHLAMYDOPHILIA PNEUMONIAE BY PCR: NOT DETECTED
CHLORIDE BLD-SCNC: 100 MMOL/L (ref 98–107)
CHLORIDE BLD-SCNC: 97 MMOL/L (ref 98–107)
CHLORIDE BLD-SCNC: 99 MMOL/L (ref 98–107)
CO2: 22 MMOL/L (ref 22–29)
CO2: 25 MMOL/L (ref 22–29)
CO2: 27 MMOL/L (ref 22–29)
CORONAVIRUS 229E BY PCR: NOT DETECTED
CORONAVIRUS HKU1 BY PCR: NOT DETECTED
CORONAVIRUS NL63 BY PCR: NOT DETECTED
CORONAVIRUS OC43 BY PCR: NOT DETECTED
CREAT SERPL-MCNC: 1.7 MG/DL (ref 0.7–1.2)
CREAT SERPL-MCNC: 1.8 MG/DL (ref 0.7–1.2)
CREAT SERPL-MCNC: 1.9 MG/DL (ref 0.7–1.2)
EOSINOPHILS ABSOLUTE: 0.01 E9/L (ref 0.05–0.5)
EOSINOPHILS RELATIVE PERCENT: 0.1 % (ref 0–6)
GFR SERPL CREATININE-BSD FRML MDRD: 37 ML/MIN/1.73
GFR SERPL CREATININE-BSD FRML MDRD: 40 ML/MIN/1.73
GFR SERPL CREATININE-BSD FRML MDRD: 43 ML/MIN/1.73
GLUCOSE BLD-MCNC: 108 MG/DL (ref 74–99)
GLUCOSE BLD-MCNC: 180 MG/DL (ref 74–99)
GLUCOSE BLD-MCNC: 257 MG/DL (ref 74–99)
HCT VFR BLD CALC: 38.8 % (ref 37–54)
HEMOGLOBIN: 13.1 G/DL (ref 12.5–16.5)
HUMAN METAPNEUMOVIRUS BY PCR: NOT DETECTED
HUMAN RHINOVIRUS/ENTEROVIRUS BY PCR: NOT DETECTED
IMMATURE GRANULOCYTES #: 0.1 E9/L
IMMATURE GRANULOCYTES %: 0.5 % (ref 0–5)
INFLUENZA A BY PCR: NOT DETECTED
INFLUENZA B BY PCR: NOT DETECTED
LACTIC ACID: 1.3 MMOL/L (ref 0.5–2.2)
LACTIC ACID: 1.9 MMOL/L (ref 0.5–2.2)
LV EF: 53 %
LVEF MODALITY: NORMAL
LYMPHOCYTES ABSOLUTE: 1.1 E9/L (ref 1.5–4)
LYMPHOCYTES RELATIVE PERCENT: 5.7 % (ref 20–42)
MAGNESIUM: 1.7 MG/DL (ref 1.6–2.6)
MAGNESIUM: 1.8 MG/DL (ref 1.6–2.6)
MAGNESIUM: 1.8 MG/DL (ref 1.6–2.6)
MCH RBC QN AUTO: 29.8 PG (ref 26–35)
MCHC RBC AUTO-ENTMCNC: 33.8 % (ref 32–34.5)
MCV RBC AUTO: 88.4 FL (ref 80–99.9)
METER GLUCOSE: 111 MG/DL (ref 74–99)
METER GLUCOSE: 129 MG/DL (ref 74–99)
METER GLUCOSE: 160 MG/DL (ref 74–99)
METER GLUCOSE: 161 MG/DL (ref 74–99)
METER GLUCOSE: 172 MG/DL (ref 74–99)
METER GLUCOSE: 173 MG/DL (ref 74–99)
METER GLUCOSE: 184 MG/DL (ref 74–99)
METER GLUCOSE: 186 MG/DL (ref 74–99)
METER GLUCOSE: 193 MG/DL (ref 74–99)
METER GLUCOSE: 235 MG/DL (ref 74–99)
METER GLUCOSE: 280 MG/DL (ref 74–99)
METER GLUCOSE: 422 MG/DL (ref 74–99)
METER GLUCOSE: 81 MG/DL (ref 74–99)
METER GLUCOSE: 98 MG/DL (ref 74–99)
MONOCYTES ABSOLUTE: 1.86 E9/L (ref 0.1–0.95)
MONOCYTES RELATIVE PERCENT: 9.7 % (ref 2–12)
MYCOPLASMA PNEUMONIAE BY PCR: NOT DETECTED
NEUTROPHILS ABSOLUTE: 16.15 E9/L (ref 1.8–7.3)
NEUTROPHILS RELATIVE PERCENT: 83.9 % (ref 43–80)
PARAINFLUENZA VIRUS 1 BY PCR: NOT DETECTED
PARAINFLUENZA VIRUS 2 BY PCR: NOT DETECTED
PARAINFLUENZA VIRUS 3 BY PCR: NOT DETECTED
PARAINFLUENZA VIRUS 4 BY PCR: NOT DETECTED
PDW BLD-RTO: 13.6 FL (ref 11.5–15)
PHOSPHORUS: 2.4 MG/DL (ref 2.5–4.5)
PHOSPHORUS: 2.8 MG/DL (ref 2.5–4.5)
PHOSPHORUS: 3 MG/DL (ref 2.5–4.5)
PLATELET # BLD: 256 E9/L (ref 130–450)
PMV BLD AUTO: 10.5 FL (ref 7–12)
POLYCHROMASIA: ABNORMAL
POTASSIUM SERPL-SCNC: 3.2 MMOL/L (ref 3.5–5)
POTASSIUM SERPL-SCNC: 4.1 MMOL/L (ref 3.5–5)
POTASSIUM SERPL-SCNC: 4.4 MMOL/L (ref 3.5–5)
PROCALCITONIN: 0.08 NG/ML (ref 0–0.08)
RBC # BLD: 4.39 E12/L (ref 3.8–5.8)
RESPIRATORY SYNCYTIAL VIRUS BY PCR: NOT DETECTED
SARS-COV-2, PCR: DETECTED
SODIUM BLD-SCNC: 134 MMOL/L (ref 132–146)
SODIUM BLD-SCNC: 135 MMOL/L (ref 132–146)
SODIUM BLD-SCNC: 135 MMOL/L (ref 132–146)
TROPONIN, HIGH SENSITIVITY: 69 NG/L (ref 0–11)
TROPONIN, HIGH SENSITIVITY: 77 NG/L (ref 0–11)
TROPONIN, HIGH SENSITIVITY: 77 NG/L (ref 0–11)
TSH SERPL DL<=0.05 MIU/L-ACNC: 0.61 UIU/ML (ref 0.27–4.2)
WBC # BLD: 19.2 E9/L (ref 4.5–11.5)

## 2023-01-27 PROCEDURE — 83605 ASSAY OF LACTIC ACID: CPT

## 2023-01-27 PROCEDURE — 85025 COMPLETE CBC W/AUTO DIFF WBC: CPT

## 2023-01-27 PROCEDURE — 2060000000 HC ICU INTERMEDIATE R&B

## 2023-01-27 PROCEDURE — 6370000000 HC RX 637 (ALT 250 FOR IP): Performed by: INTERNAL MEDICINE

## 2023-01-27 PROCEDURE — 0202U NFCT DS 22 TRGT SARS-COV-2: CPT

## 2023-01-27 PROCEDURE — 6360000002 HC RX W HCPCS: Performed by: INTERNAL MEDICINE

## 2023-01-27 PROCEDURE — 2580000003 HC RX 258: Performed by: INTERNAL MEDICINE

## 2023-01-27 PROCEDURE — 6360000002 HC RX W HCPCS

## 2023-01-27 PROCEDURE — 80048 BASIC METABOLIC PNL TOTAL CA: CPT

## 2023-01-27 PROCEDURE — 6370000000 HC RX 637 (ALT 250 FOR IP): Performed by: NURSE PRACTITIONER

## 2023-01-27 PROCEDURE — 6360000002 HC RX W HCPCS: Performed by: EMERGENCY MEDICINE

## 2023-01-27 PROCEDURE — 84484 ASSAY OF TROPONIN QUANT: CPT

## 2023-01-27 PROCEDURE — 36415 COLL VENOUS BLD VENIPUNCTURE: CPT

## 2023-01-27 PROCEDURE — 83735 ASSAY OF MAGNESIUM: CPT

## 2023-01-27 PROCEDURE — 2580000003 HC RX 258

## 2023-01-27 PROCEDURE — 84443 ASSAY THYROID STIM HORMONE: CPT

## 2023-01-27 PROCEDURE — 6370000000 HC RX 637 (ALT 250 FOR IP): Performed by: EMERGENCY MEDICINE

## 2023-01-27 PROCEDURE — 82962 GLUCOSE BLOOD TEST: CPT

## 2023-01-27 PROCEDURE — 6370000000 HC RX 637 (ALT 250 FOR IP)

## 2023-01-27 PROCEDURE — 2580000003 HC RX 258: Performed by: EMERGENCY MEDICINE

## 2023-01-27 PROCEDURE — 99222 1ST HOSP IP/OBS MODERATE 55: CPT | Performed by: INTERNAL MEDICINE

## 2023-01-27 PROCEDURE — 84100 ASSAY OF PHOSPHORUS: CPT

## 2023-01-27 PROCEDURE — 2500000003 HC RX 250 WO HCPCS: Performed by: INTERNAL MEDICINE

## 2023-01-27 PROCEDURE — 93306 TTE W/DOPPLER COMPLETE: CPT

## 2023-01-27 PROCEDURE — 2500000003 HC RX 250 WO HCPCS: Performed by: EMERGENCY MEDICINE

## 2023-01-27 PROCEDURE — 84145 PROCALCITONIN (PCT): CPT

## 2023-01-27 PROCEDURE — 2500000003 HC RX 250 WO HCPCS: Performed by: NURSE PRACTITIONER

## 2023-01-27 RX ORDER — INSULIN LISPRO 100 [IU]/ML
0-4 INJECTION, SOLUTION INTRAVENOUS; SUBCUTANEOUS NIGHTLY
Status: DISCONTINUED | OUTPATIENT
Start: 2023-01-27 | End: 2023-01-31 | Stop reason: HOSPADM

## 2023-01-27 RX ORDER — METOPROLOL SUCCINATE 25 MG/1
25 TABLET, EXTENDED RELEASE ORAL 2 TIMES DAILY
Status: DISCONTINUED | OUTPATIENT
Start: 2023-01-27 | End: 2023-01-31 | Stop reason: HOSPADM

## 2023-01-27 RX ORDER — INSULIN GLARGINE 100 [IU]/ML
30 INJECTION, SOLUTION SUBCUTANEOUS ONCE
Status: COMPLETED | OUTPATIENT
Start: 2023-01-27 | End: 2023-01-27

## 2023-01-27 RX ORDER — INSULIN LISPRO 100 [IU]/ML
0-8 INJECTION, SOLUTION INTRAVENOUS; SUBCUTANEOUS
Status: DISCONTINUED | OUTPATIENT
Start: 2023-01-27 | End: 2023-01-31 | Stop reason: HOSPADM

## 2023-01-27 RX ORDER — ROSUVASTATIN CALCIUM 20 MG/1
20 TABLET, COATED ORAL NIGHTLY
Status: DISCONTINUED | OUTPATIENT
Start: 2023-01-27 | End: 2023-01-31 | Stop reason: HOSPADM

## 2023-01-27 RX ORDER — CLONIDINE HYDROCHLORIDE 0.2 MG/1
0.2 TABLET ORAL 2 TIMES DAILY
Status: DISCONTINUED | OUTPATIENT
Start: 2023-01-27 | End: 2023-01-31 | Stop reason: HOSPADM

## 2023-01-27 RX ORDER — DIAZEPAM 5 MG/1
5 TABLET ORAL DAILY
Status: DISCONTINUED | OUTPATIENT
Start: 2023-01-27 | End: 2023-01-31 | Stop reason: HOSPADM

## 2023-01-27 RX ORDER — ACETAMINOPHEN 325 MG/1
650 TABLET ORAL ONCE
Status: COMPLETED | OUTPATIENT
Start: 2023-01-27 | End: 2023-01-27

## 2023-01-27 RX ORDER — HYDRALAZINE HYDROCHLORIDE 20 MG/ML
10 INJECTION INTRAMUSCULAR; INTRAVENOUS EVERY 4 HOURS PRN
Status: DISCONTINUED | OUTPATIENT
Start: 2023-01-27 | End: 2023-01-31 | Stop reason: HOSPADM

## 2023-01-27 RX ORDER — WATER 1000 ML/1000ML
INJECTION, SOLUTION INTRAVENOUS
Status: COMPLETED
Start: 2023-01-27 | End: 2023-01-27

## 2023-01-27 RX ADMIN — POTASSIUM CHLORIDE 10 MEQ: 7.46 INJECTION, SOLUTION INTRAVENOUS at 05:18

## 2023-01-27 RX ADMIN — POTASSIUM CHLORIDE 10 MEQ: 7.46 INJECTION, SOLUTION INTRAVENOUS at 02:05

## 2023-01-27 RX ADMIN — DIAZEPAM 5 MG: 5 TABLET ORAL at 20:51

## 2023-01-27 RX ADMIN — DIAZEPAM 5 MG: 5 TABLET ORAL at 11:07

## 2023-01-27 RX ADMIN — WATER: 1 INJECTION INTRAMUSCULAR; INTRAVENOUS; SUBCUTANEOUS at 11:06

## 2023-01-27 RX ADMIN — POTASSIUM CHLORIDE 10 MEQ: 7.46 INJECTION, SOLUTION INTRAVENOUS at 07:36

## 2023-01-27 RX ADMIN — INSULIN LISPRO 2 UNITS: 100 INJECTION, SOLUTION INTRAVENOUS; SUBCUTANEOUS at 12:00

## 2023-01-27 RX ADMIN — WATER 2000 MG: 1 INJECTION INTRAMUSCULAR; INTRAVENOUS; SUBCUTANEOUS at 11:07

## 2023-01-27 RX ADMIN — ACETAMINOPHEN 650 MG: 325 TABLET ORAL at 08:15

## 2023-01-27 RX ADMIN — CLONIDINE HYDROCHLORIDE 0.2 MG: 0.2 TABLET ORAL at 11:06

## 2023-01-27 RX ADMIN — SODIUM CHLORIDE 0.29 UNITS/KG/HR: 9 INJECTION, SOLUTION INTRAVENOUS at 01:01

## 2023-01-27 RX ADMIN — METOPROLOL TARTRATE 12.5 MG: 25 TABLET, FILM COATED ORAL at 11:07

## 2023-01-27 RX ADMIN — POTASSIUM CHLORIDE 10 MEQ: 7.46 INJECTION, SOLUTION INTRAVENOUS at 06:25

## 2023-01-27 RX ADMIN — INSULIN GLARGINE 30 UNITS: 100 INJECTION, SOLUTION SUBCUTANEOUS at 08:17

## 2023-01-27 RX ADMIN — TRIMETHOBENZAMIDE HYDROCHLORIDE 200 MG: 100 INJECTION INTRAMUSCULAR at 09:08

## 2023-01-27 RX ADMIN — CLONIDINE HYDROCHLORIDE 0.2 MG: 0.2 TABLET ORAL at 20:51

## 2023-01-27 RX ADMIN — DEXTROSE AND SODIUM CHLORIDE: 5; 450 INJECTION, SOLUTION INTRAVENOUS at 07:21

## 2023-01-27 RX ADMIN — Medication 1 SPRAY: at 06:25

## 2023-01-27 RX ADMIN — SODIUM PHOSPHATE, MONOBASIC, MONOHYDRATE AND SODIUM PHOSPHATE, DIBASIC, ANHYDROUS 10 MMOL: 276; 142 INJECTION, SOLUTION INTRAVENOUS at 02:38

## 2023-01-27 RX ADMIN — Medication 1 SPRAY: at 00:25

## 2023-01-27 RX ADMIN — ROSUVASTATIN CALCIUM 20 MG: 20 TABLET, FILM COATED ORAL at 20:51

## 2023-01-27 RX ADMIN — LABETALOL HYDROCHLORIDE 10 MG: 5 INJECTION INTRAVENOUS at 07:38

## 2023-01-27 RX ADMIN — POTASSIUM CHLORIDE 10 MEQ: 7.46 INJECTION, SOLUTION INTRAVENOUS at 04:20

## 2023-01-27 RX ADMIN — METOPROLOL SUCCINATE 25 MG: 25 TABLET, EXTENDED RELEASE ORAL at 20:51

## 2023-01-27 RX ADMIN — POTASSIUM CHLORIDE 10 MEQ: 7.46 INJECTION, SOLUTION INTRAVENOUS at 03:06

## 2023-01-27 RX ADMIN — Medication 1 SPRAY: at 02:07

## 2023-01-27 RX ADMIN — SERTRALINE 50 MG: 100 TABLET, FILM COATED ORAL at 11:06

## 2023-01-27 RX ADMIN — METOPROLOL TARTRATE 5 MG: 5 INJECTION, SOLUTION INTRAVENOUS at 02:06

## 2023-01-27 ASSESSMENT — PAIN DESCRIPTION - DESCRIPTORS
DESCRIPTORS: THROBBING
DESCRIPTORS: DISCOMFORT

## 2023-01-27 ASSESSMENT — PAIN SCALES - GENERAL
PAINLEVEL_OUTOF10: 6
PAINLEVEL_OUTOF10: 4
PAINLEVEL_OUTOF10: 2
PAINLEVEL_OUTOF10: 0
PAINLEVEL_OUTOF10: 4
PAINLEVEL_OUTOF10: 4
PAINLEVEL_OUTOF10: 2

## 2023-01-27 ASSESSMENT — PAIN - FUNCTIONAL ASSESSMENT
PAIN_FUNCTIONAL_ASSESSMENT: PREVENTS OR INTERFERES SOME ACTIVE ACTIVITIES AND ADLS
PAIN_FUNCTIONAL_ASSESSMENT: ACTIVITIES ARE NOT PREVENTED

## 2023-01-27 ASSESSMENT — PAIN DESCRIPTION - LOCATION
LOCATION: THROAT
LOCATION: THROAT
LOCATION: OTHER (COMMENT);HEAD
LOCATION: THROAT

## 2023-01-27 ASSESSMENT — PAIN DESCRIPTION - ORIENTATION: ORIENTATION: RIGHT;LEFT

## 2023-01-27 NOTE — PROGRESS NOTES
Critical Care Team - Daily Progress Note      Date and time: 2023 7:53 AM  Patient's name:  Caryl Tejeda  Medical Record Number: [id number]  Patient's account/billing number: [de-identified]  Patient's YOB: 1950  Age: 81 y.o. Date of Admission: 2023  9:03 AM  Length of stay during current admission: 1      Primary Care Physician: Elizabeth Sarah DO  ICU Attending Physician: Dr. Saida Ware    Code Status: Prior    Reason for ICU admission: DKA      SUBJECTIVE:     OVERNIGHT EVENTS:         : No overnight events. Patient is still nauseous on initial exam, ordered Tigan with improvement. Patient will be bridged this morning with 2 close gaps. Intake/Output:   No intake/output data recorded. I/O last 3 completed shifts: In: 4434.6 [I.V.:3024.1; IV Piggyback:1410.5]  Out: 230 [Urine:230]    Awake and following commands: Yes  Current Ventilation: - No ventilator support  Secretions: n/a  Sedation: none  Paralyzed: No  Vasopressors: None    ROS:  Unless stated above, ROS is otherwise negative. Initial HPI + past overnight events: Patient presented emergency department yesterday for multiple episodes of emesis, was found to be in diabetic ketoacidosis. Patient was administered multiple doses of antiemetics. He was on the DKA protocol.     OBJECTIVE:     VITAL SIGNS:  BP (!) 206/104   Pulse 89   Temp 98.9 °F (37.2 °C) (Oral)   Resp 18   Ht 5' 7\" (1.702 m)   Wt 163 lb 2.3 oz (74 kg)   SpO2 96%   BMI 25.55 kg/m²   Tmax over 24 hours:  Temp (24hrs), Av.7 °F (37.1 °C), Min:98.1 °F (36.7 °C), Max:99 °F (37.2 °C)      Patient Vitals for the past 6 hrs:   BP Temp Temp src Pulse Resp SpO2 Weight   23 0738 (!) 206/104 -- -- 89 -- -- --   23 0422 -- -- -- -- -- -- 163 lb 2.3 oz (74 kg)   23 0400 (!) 193/97 98.9 °F (37.2 °C) Oral 87 18 96 % --   23 0300 (!) 187/99 -- -- 79 (!) 8 -- --   23 0200 (!) 195/116 98.8 °F (37.1 °C) Oral 96 17 97 % -- Intake/Output Summary (Last 24 hours) at 1/27/2023 0753  Last data filed at 1/27/2023 7975  Gross per 24 hour   Intake 4434.59 ml   Output 230 ml   Net 4204.59 ml     Wt Readings from Last 2 Encounters:   01/27/23 163 lb 2.3 oz (74 kg)   06/08/22 177 lb (80.3 kg)     Body mass index is 25.55 kg/m². Constitutional/General: Alert and oriented x3  Head: Normocephalic and atraumatic  Eyes: PERRL, EOMI, conjunctiva normal, sclera non icteric  ENT:  Oropharynx clear, handling secretions, no trismus, no asymmetry of the posterior oropharynx or uvular edema  Neck: Supple, full ROM, no stridor, no meningeal signs  Respiratory: Lungs clear to auscultation bilaterally, no wheezes, or rales. Not in respiratory distress. Scattered rhonchi bilaterally. Cardiovascular:  Regular rate. Regular rhythm. No murmurs, no gallops, no rubs. 2+ distal pulses. Equal extremity pulses. Chest: No chest wall tenderness  GI:  Abdomen Soft, mild general tender, Non distended. +BS. No rebound, guarding, or rigidity. No pulsatile masses. Musculoskeletal: Moves all extremities x 4. Warm and well perfused, no clubbing, no cyanosis, no edema. Capillary refill <3 seconds  Integument: skin warm and dry. No rashes.    Neurologic: GCS 15, no focal deficits, symmetric strength 5/5 in the upper and lower extremities bilaterally  Psychiatric: Normal Affect      MEDICATIONS:    Scheduled Meds:   acetaminophen  650 mg Oral Once    insulin glargine  30 Units SubCUTAneous Once    metoprolol  5 mg IntraVENous Q6H     Continuous Infusions:   sodium chloride Stopped (01/26/23 1704)    dextrose 5 % and 0.45 % NaCl 150 mL/hr at 01/27/23 0721    insulin 0.025 Units/kg/hr (01/27/23 0700)     PRN Meds:   dextrose bolus, 125 mL, PRN   Or  dextrose bolus, 250 mL, PRN  potassium chloride, 10 mEq, PRN  magnesium sulfate, 1,000 mg, PRN  sodium phosphate IVPB, 10 mmol, PRN   Or  sodium phosphate IVPB, 15 mmol, PRN   Or  sodium phosphate IVPB, 20 mmol, PRN  dextrose 5 % and 0.45 % NaCl, , Continuous PRN  labetalol, 10 mg, Q4H PRN  hydrALAZINE, 10 mg, Q6H PRN  trimethobenzamide, 200 mg, Q6H PRN  phenol, 1 spray, Q2H PRN  diazePAM, 5 mg, Nightly PRN          VENT SETTINGS (Comprehensive) (if applicable): Additional Respiratory Assessments  Heart Rate: 89  Resp: 18  SpO2: 96 %    Arterial Blood Gas 1/27/2023  No results for input(s): PH, PCO2, PO2, HCO3, BE, O2SAT in the last 72 hours. Laboratory findings:    Complete Blood Count:   Recent Labs     01/26/23 0923 01/27/23 0410   WBC 16.5* 19.2*   HGB 15.3 13.1   HCT 43.7 38.8    256        Last 3 Blood Glucose:   Recent Labs     01/26/23 2144 01/27/23 0002 01/27/23 0410   GLUCOSE 312* 257* 108*        PT/INR:    Lab Results   Component Value Date/Time    PROTIME 10.9 03/28/2021 05:35 AM    INR 1.0 03/28/2021 05:35 AM     PTT:  No results found for: APTT, PTT    Comprehensive Metabolic Profile:   Recent Labs     01/26/23 0923 01/26/23  1233 01/26/23 2144 01/27/23 0002 01/27/23 0410   *   < > 134 134 135   K 4.2   < > 4.1 3.2* 4.1   CL 83*   < > 97* 97* 99   CO2 23   < > 23 25 27   BUN 34*   < > 32* 31* 30*   CREATININE 2.1*   < > 1.9* 1.9* 1.8*   GLUCOSE 550*   < > 312* 257* 108*   CALCIUM 10.1   < > 9.0 8.9 8.7   PROT 7.2  --   --   --   --    LABALBU 3.8  --   --   --   --    BILITOT 0.5  --   --   --   --    ALKPHOS 85  --   --   --   --    AST 41*  --   --   --   --    ALT 20  --   --   --   --     < > = values in this interval not displayed. Magnesium:   Lab Results   Component Value Date/Time    MG 1.8 01/27/2023 04:10 AM     Phosphorus:   Lab Results   Component Value Date/Time    PHOS 3.0 01/27/2023 04:10 AM     Ionized Calcium:   Lab Results   Component Value Date/Time    CAION 1.22 01/15/2018 09:33 AM        Urinalysis:     Troponin: No results for input(s): TROPONINI in the last 72 hours.     Microbiology:  Cultures drawn: No    Radiology/Imaging:       ASSESSMENT: Patient Active Problem List    Diagnosis Date Noted    DKA, type 1, not at goal [facility]) 01/26/2023    Diabetic foot infection ([facility] [address] 03/24/2021    Osteomyelitis of great toe of left foot ([facility] [address] 03/24/2021    Delirium due to another medical condition 01/11/2018    Rhabdomyolysis 01/09/2018         PLAN:     Neuro  No acute process     Respiratory  No acute process     Cardiovascular  Elevated troponin  76 delta 72 in the ED. 79 > 83 > 77 > 77 > 69  Hypertension  Unable to take meds secondary to nausea and emesis  Labetalol 10 mg every 6 as needed, labetalol 10 mg as needed every 4 hours  Goal SBP below 170  3. ? Atrial fibrillation   -Intermittent runs last night and would spontaneously convert   -Cardiology consultation 1/27     GI  Nausea vomiting in the presence of diabetic ketoacidosis  NPO pending closure of anion gap  Tigan as needed for nausea,  ms     Infectious Disease  Leukocytosis with no identifiable origin  Gave 1 dose of ceftriaxone 2 g with some abd pain, N/V and trending up leukocytosis 16 > 19  Ordered respiratory panel, pending  Ordered procalcitonin, pending     Renal  Stage II GENIA likely secondary to poor oral intake  Baseline creatinine 1.2  CR 2.0 (1/26)  Trend BMPs  Strict I's and O's  Hagma secondary to DKA  Anion gap 23  Continue IV  mL/hr  Lactic 2.7>1.3 improved    Social/Spiritual/DNR/Other  Code status: Prior  Diet ADULT DIET; Regular; 3 carb choices (45 gm/meal); Low Sodium (2 gm)  Stress ulcer prophylaxis: n/a  DVT prophylaxis: None  Consultations needed: Yes cardiology  Transfer out of ICU today? Yes    Lines/catheters  Date  Peripheral line 1/26    Patient will be downgraded to a telemetry floor. Leti Benites DO  7:53 AM  01/27/23    I personally saw, examined and provided care for the patient. Radiographs, labs and medication list were reviewed by me independently. Review of Residents documentation was conducted and revisions were made as appropriate.  I agree with the above documented exam, problem list and plan of care. Patient is improved with fluid resuscitation. DKA improved.       CCT [address], DO

## 2023-01-27 NOTE — PLAN OF CARE
Problem: Discharge Planning  Goal: Discharge to home or other facility with appropriate resources  Outcome: Progressing     Problem: Pain  Goal: Verbalizes/displays adequate comfort level or baseline comfort level  Outcome: Progressing     Problem: Safety - Adult  Goal: Free from fall injury  Outcome: Progressing  Flowsheets (Taken 1/27/2023 0952 by Ke Gomez RN)  Free From Fall Injury: Instruct family/caregiver on patient safety     Problem: Skin/Tissue Integrity  Goal: Absence of new skin breakdown  Description: 1. Monitor for areas of redness and/or skin breakdown  2. Assess vascular access sites hourly  3. Every 4-6 hours minimum:  Change oxygen saturation probe site  4. Every 4-6 hours:  If on nasal continuous positive airway pressure, respiratory therapy assess nares and determine need for appliance change or resting period.   Outcome: Progressing     Problem: Chronic Conditions and Co-morbidities  Goal: Patient's chronic conditions and co-morbidity symptoms are monitored and maintained or improved  Outcome: Progressing

## 2023-01-27 NOTE — PROGRESS NOTES
Reason for consult: DKA    A1C:      [x] Not available                     Patient states the following concerns/barriers to diabetes self-management:     [x] None       [] Medication cost   [] Food cost/availability        [] Reading  [] Hearing   [] Vision                [] Work    [] Transportation  [] No insurance  [] Physical limitations    [] Other:        Patient states the following about their diabetes/health:   [x]   Patient is mindful of what he eats at home, eats 3 meals per day  [x]   He takes Lantus 30 units at HS, metformin 500 mg BID, and glipizide 10 mg BID   [x]   He needs a new glucose meter and testing supplies as his meter is old   [x]   He exercises at least 3 days per week  [x]   He became ill on Tuesday night with N/V--found to be in DKA in ER       Diabetes survival packet provided to:   [x] Patient     [] Other:    Information reviewed:   Definition of diabetes   Target glucose ranges/A1C   Self-monitoring of blood glucose   Prevention/symptoms/treatment of hypo-/hyperglycemia   Medication adherence   The plate method/meal planning guidelines   The benefits of exercise and recommendations   Reducing the risk of chronic complications            Post-education Assessment  [x]  Attentive to teaching  [x]  Answered questions appropriately when asked   [x]  Seems able to apply concepts to daily lifestyle  [x]  Seems motivated to do well  [x]  Verbalized an understanding of the plate method for portion control   [x]  Verbalized an understanding of prescribed antidiabetes medications   [x]  Verbalized an understanding of target glucose ranges/A1C level  [x]  Expresses an intent to comply with treatment plan   []  Showed very little interest in complying with treatment plan   []  Seems to have trouble applying concepts to daily lifestyle       COMMENTS:  Patient pleasant and attentive. He expresses an interest in attending outpatient diabetes educations classes in the future. Recommendations:   [x] Carbohydrate-controlled diet    [x] Script for glucometer and supplies (per preference of patient's insurance)  [] Script for insulin pens and pen needles (if insulin is ordered at discharge for home use)   [] Consult to social work: patient has no insurance or has financial hardship  [] Inpatient consult to endocrinologist   [] Follow up with endocrinologist as an outpatient   [] Home healthcare nursing to increase compliance to treatment plan   [x] Script for outpatient diabetes education classes (from doctor)        Thank you for this consult.     So Marie, RN, BSN, Kaykay Lam

## 2023-01-27 NOTE — DISCHARGE INSTRUCTIONS
Go to [facility] at discharge or next day and have zio patch placed  [place].   Rafa Palmer

## 2023-01-27 NOTE — CARE COORDINATION
Pt admit ICU for DKA. Initially on insulin drip bridged to ISS. Pt c/o nausea and vomiting prior to admit. Unable to take meds. Lives alone in 1st floor apt. Has wheeled and cane. Independent prior to admit. Has insulin and supplies. Has old glucometer-would like a new one. WILL NEED SCRIPT FOR GLUCOMETER/LANCETS/TESTING STRIPS prior to discharge. PCP is Dr Juan Harrell. Declines any home care. Will follow-mjo    The Plan for Transition of Care is related to the following treatment goals: home     The Patient and/or patient representative pt was provided with a choice of provider and agrees   with the discharge plan. [x] Yes [] No    Freedom of choice list was provided with basic dialogue that supports the patient's individualized plan of care/goals, treatment preferences and shares the quality data associated with the providers.  [x] Yes [] No

## 2023-01-27 NOTE — PATIENT CARE CONFERENCE
Intensive Care Daily Quality Rounding Checklist      ICU Team Members: Bedside Nurse; Charge Nurse; Dr. Emma Ramsay;  Residents     ICU Day #: NUMBER: 2    Intubation Date:      Ventilator Day #:     Central Line Insertion Date:          Day #:         Indication:      Arterial Line Insertion Date:        Day #:     Temporary Hemodialysis Catheter Insertion Date:        Day #     DVT Prophylaxis:    GI Prophylaxis:    Mae Catheter Insertion Date:         Day #:       Continued need (if yes, reason documented and discussed with physician):     Skin Issues/ Wounds and ordered treatment discussed on rounds: No     Goals/ Plans for the Day: Monitor labs; Replace electrolytes; Bridge Insulin; Start diet as tolerated

## 2023-01-27 NOTE — CONSULTS
Inpatient Cardiology Consultation      Reason for Consult: New onset atrial fibrillation    Consulting Physician: Dr. Lindle Simmonds    Requesting Physician: Dr. Russ Dumont    Date of Consultation: 1/27/2023    HISTORY OF PRESENT ILLNESS:     This 74-year-old male is known to [facility] cardiology but has not been evaluated since a hospital consult in January 2018 by Dr. Murphy Dee. He does not follow-up at our office. At that time he was seen for an elevated troponin but it was not consistent with an acute coronary syndrome. A 2D echocardiogram was done which was unremarkable. He had flown home from [facility] and had eaten some hall on Tuesday evening. He woke up Wednesday morning vomiting multiple times. His abdomen was uncomfortable and he developed a throat burning due to multiple vomiting episodes. He actually had no chest pain. He had a cough but no fever. He was unable to keep much down in his sister finally brought him to the emergency room. He states he was diaphoretic as well. He presented to the emergency room yesterday with nausea and vomiting and abdominal pain and in DKA    Blood pressure on admission was 129/78 that did go up to 212/112, and he was tachycardic at 102 to 115 bpm, there was no fever or hypoxia on room air. EKG on admission sinus tachycardia with PACs and a nonspecific intraventricular block and age undetermined lateral and inferior infarct possibly. QTC was mildly prolonged at 503 ms    Chest x-ray showed no acute process and CT of the abdomen and pelvis showed cholelithiasis with no obstruction or ascites and there is arterial sclerosis without an abdominal aortic aneurysm    Serum glucose was 550 and sodium was 130 with a BUN and creatinine 34 and 2.1, (baseline creatinine from March 2021 was 1.1.   High-sensitivity troponin [id number], WBCs 16.5 and H&H 15.3 and 43.7, blood cultures remaining negative acid 4.6    EKG later was sinus rhythm with a left axis deviation and unable to rule out anterior and inferior infarct and possible lateral ischemia with T wave inversion and a QTC of 538    He was in GENIA , DKA and treated with fluid resuscitation    He developed atrial fibrillation and cardiology was consulted. He was treated with Lopressor 5 mg IV every 6 hours and a fluid bolus, multiple antiemetics    WBCs are up to 19.2 today sodium has returned to 135 and BUN and creatinine are now 30 and 1.8. He is currently in sinus rhythm    Past medical history  Occasional tobacco use  Prior mature coronary artery disease in the family. Hypertension  Hyperlipidemia  Left heart cardiac catheterization at the [place] 15 to 20 years ago  Questionable GERD  Diabetes, insulin-requiring, neuropathy  Dementia  Depression, he denies  Anxiety  Left toe amputation due to a \"nail problem \"  Back surgery  Elevated cardiac enzymes 0.13, 0.10, 0.07 not consistent with acute coronary syndrome in January 2018  2D echocardiogram January 10, 2018   No significant valve disease   Normal left ventricle size and systolic function. Indeterminate diastolic function. No wall motion abnormalities. Ejection fraction is visually estimated at 65%. Mild concentric left ventricular hypertrophy. Prolonged QTC      Medications Prior to admit:  Prior to Admission medications    Medication Sig Start Date End Date Taking? Authorizing Provider   insulin glargine (LANTUS) 100 UNIT/ML injection vial Inject 30 Units into the skin nightly    Historical Provider, MD   diazePAM (VALIUM) 5 MG tablet Take 5 mg by mouth daily as needed for Anxiety.     Historical Provider, MD   aspirin 81 MG EC tablet Take 81 mg by mouth every morning    Historical Provider, MD   SITagliptin (JANUVIA) 50 MG tablet Take 50 mg by mouth Daily with lunch     Historical Provider, MD   metFORMIN (GLUCOPHAGE) 500 MG tablet Take 500 mg by mouth 2 times daily (with meals)    Historical Provider, MD   sertraline (ZOLOFT) 50 MG tablet Take 50 mg by mouth every morning    Historical Provider, MD   cloNIDine (CATAPRES) 0.2 MG tablet Take 1 tablet by mouth 2 times daily 7/6/18   Brittni Condon,    Ergocalciferol (VITAMIN D2) 2000 units TABS Take 2,000 Units by mouth Daily with lunch     Historical Provider, MD   hydrOXYzine (ATARAX) 25 MG tablet Take 25 mg by mouth nightly    Historical Provider, MD   magnesium oxide (MAG-OX) 400 (240 Mg) MG tablet Take 1 tablet by mouth 2 times daily 1/16/18   Brittni Condon DO   famotidine (PEPCID) 20 MG tablet Take 1 tablet by mouth 2 times daily 1/16/18   Brittni Condon, DO   glipiZIDE (GLUCOTROL) 10 MG tablet Take 10 mg by mouth 2 times daily (before meals)    Historical Provider, MD   rosuvastatin (CRESTOR) 40 MG tablet Take 40 mg by mouth nightly     Historical Provider, MD   fenofibrate (TRICOR) 145 MG tablet Take 145 mg by mouth every morning    Historical Provider, MD       Current Medications:    Current Facility-Administered Medications: acetaminophen (TYLENOL) tablet 650 mg, 650 mg, Oral, Once  insulin glargine (LANTUS) injection vial 30 Units, 30 Units, SubCUTAneous, Once  dextrose bolus 10% 125 mL, 125 mL, IntraVENous, PRN **OR** dextrose bolus 10% 250 mL, 250 mL, IntraVENous, PRN  potassium chloride 10 mEq/100 mL IVPB (Peripheral Line), 10 mEq, IntraVENous, PRN  magnesium sulfate 1000 mg in dextrose 5% 100 mL IVPB, 1,000 mg, IntraVENous, PRN  sodium phosphate 10 mmol in sodium chloride 0.9 % 250 mL IVPB, 10 mmol, IntraVENous, PRN **OR** sodium phosphate 15 mmol in dextrose 5 % 250 mL IVPB, 15 mmol, IntraVENous, PRN **OR** sodium phosphate 20 mmol in dextrose 5 % 500 mL IVPB, 20 mmol, IntraVENous, PRN  [COMPLETED] 0.9 % sodium chloride bolus, 15 mL/kg, IntraVENous, Once **FOLLOWED BY** 0.9 % sodium chloride infusion, , IntraVENous, Continuous  dextrose 5 % and 0.45 % sodium chloride infusion, , IntraVENous, Continuous PRN  insulin regular (HUMULIN R;NOVOLIN R) 100 Units in sodium chloride 0.9 % 100 mL infusion, 0. 01-0.5 Units/kg/hr, IntraVENous, Continuous  labetalol (NORMODYNE;TRANDATE) injection 10 mg, 10 mg, IntraVENous, Q4H PRN  hydrALAZINE (APRESOLINE) injection 10 mg, 10 mg, IntraVENous, Q6H PRN  trimethobenzamide (TIGAN) injection 200 mg, 200 mg, IntraMUSCular, Q6H PRN  metoprolol (LOPRESSOR) injection 5 mg, 5 mg, IntraVENous, Q6H  phenol 1.4 % mouth spray 1 spray, 1 spray, Mouth/Throat, Q2H PRN  diazePAM (VALIUM) tablet 5 mg, 5 mg, Oral, Nightly PRN    Allergies:  Patient has no known allergies. Social History: Occasional tobacco no alcohol or illicit drugs and is sleep daily       Family History: Father with premature coronary artery disease and  of a massive MI at the age of 29 and brother had 2 stents in his early 62s, mother  of Alzheimer's complications  No family history on file. REVIEW OF SYSTEMS:     Constitutional: Denies fatigue, fevers, chills or night sweats  Eyes: Denies visual changes or drainage  ENT: Denies headaches or hearing loss. No mouth sores or sore throat. No epistaxis   Cardiovascular: Denies chest pain, pressure or palpitations. No lower extremity swelling. Respiratory: Denies JAIME, cough, orthopnea or PND. No hemoptysis   Gastrointestinal: Denies hematemesis or anorexia. No hematochezia or melena    Genitourinary: Denies urgency, dysuria or hematuria. Musculoskeletal: Denies gait disturbance, weakness or joint complaints  Integumentary: Denies rash, hives or pruritis   Neurological: Denies dizziness, headaches or seizures. No numbness or tingling  Psychiatric: Denies anxiety or depression. Endocrine: Denies temperature intolerance. No recent weight change. .  Hematologic/Lymphatic: Denies abnormal bruising or bleeding.  No swollen lymph nodes    PHYSICAL EXAM:   BP (!) 206/104   Pulse 89   Temp 98.9 °F (37.2 °C) (Oral)   Resp 18   Ht 5' 7\" (1.702 m)   Wt 163 lb 2.3 oz (74 kg)   SpO2 96%   BMI 25.55 kg/m²   CONST:  Well developed, well nourished who appears of stated age. Awake, alert and cooperative. No apparent distress. HEENT:   Head- Normocephalic, atraumatic   Eyes- Conjunctivae pink, anicteric  Throat- Oral mucosa pink and moist  Neck-  No stridor, trachea midline, no jugular venous distention. No carotid bruit. CHEST: Chest symmetrical and non-tender to palpation. No accessory muscle use or intercostal retractions  RESPIRATORY: Lung sounds - clear throughout fields   CARDIOVASCULAR:     Heart Inspection- shows no noted pulsations  Heart Palpation- no heaves or thrills; PMI is non-displaced   Heart Ausculation- Regular rate and rhythm, no murmur. No s3, s4 or rub   PV: No lower extremity edema. No varicosities. Pedal pulses palpable, no clubbing or cyanosis   ABDOMEN: Soft, non-tender to light palpation. Bowel sounds present. No palpable masses no organomegaly; no abdominal bruit  MS: Good muscle strength and tone. No atrophy or abnormal movements. : Deferred  SKIN: Warm and dry no statis dermatitis or ulcers   NEURO / PSYCH: Oriented to person, place and time. Speech clear and appropriate. Follows all commands.  Pleasant affect     DATA:    ECG / Tele strips: Sinus rhythm  Diagnostic:      Intake/Output Summary (Last 24 hours) at 1/27/2023 0752  Last data filed at 1/27/2023 5138  Gross per 24 hour   Intake 4434.59 ml   Output 230 ml   Net 4204.59 ml       Labs:   CBC:   Recent Labs     01/26/23  0923 01/27/23 0410   WBC 16.5* 19.2*   HGB 15.3 13.1   HCT 43.7 38.8    256     BMP:   Recent Labs     01/27/23  0002 01/27/23  0410    135   K 3.2* 4.1   CO2 25 27   BUN 31* 30*   CREATININE 1.9* 1.8*   LABGLOM 37 40   CALCIUM 8.9 8.7     Mag:   Recent Labs     01/27/23  0002 01/27/23  0410   MG 1.8 1.8     Phos:   Recent Labs     01/27/23  0002 01/27/23  0410   PHOS 2.4* 3.0     TFT:   Lab Results   Component Value Date    TSH 0.686 01/10/2018      HgA1c:   Lab Results   Component Value Date/Time    LABA1C 8.3 01/10/2018 03:15 AM     No results found for: EAG  proBNP:   Lab Results   Component Value Date/Time    PROBNP 302 09/07/2019 07:25 AM    PROBNP 5,109 01/09/2018 11:00 AM     PT/INR: No results for input(s): PROTIME, INR in the last 72 hours. APTT:No results for input(s): APTT in the last 72 hours. TROPONIN:  Lab Results   Component Value Date/Time    TROPHS 77 01/27/2023 04:10 AM    TROPHS 77 01/27/2023 12:02 AM    TROPHS 83 01/26/2023 09:44 PM    TROPHS 79 01/26/2023 04:00 PM    TROPHS 72 01/26/2023 12:33 PM     CK:  Lab Results   Component Value Date/Time    CKTOTAL 147 01/15/2018 09:33 AM    CKTOTAL 262 01/14/2018 07:13 AM    CKTOTAL 2,317 01/10/2018 03:15 AM     FASTING LIPID PANEL:  Lab Results   Component Value Date/Time    CHOL 155 01/10/2018 03:15 AM    HDL 29 01/10/2018 03:15 AM    LDLCALC 58 01/10/2018 03:15 AM    TRIG 338 01/10/2018 03:15 AM     LIVER PROFILE:  Recent Labs     01/26/23  0923   AST 41*   ALT 20   LABALBU 3.8     COVID19: No results for input(s): COVID19 in the last 72 hours.     Electronically signed by ANDRAE Ochoa CNP on 1/27/2023 at 7:52 AM

## 2023-01-28 LAB
BASOPHILS ABSOLUTE: 0.02 E9/L (ref 0–0.2)
BASOPHILS RELATIVE PERCENT: 0.2 % (ref 0–2)
C-REACTIVE PROTEIN: 1.5 MG/DL (ref 0–0.4)
EKG ATRIAL RATE: 90 BPM
EKG P AXIS: 12 DEGREES
EKG P-R INTERVAL: 160 MS
EKG Q-T INTERVAL: 440 MS
EKG QRS DURATION: 122 MS
EKG QTC CALCULATION (BAZETT): 538 MS
EKG R AXIS: -79 DEGREES
EKG T AXIS: 125 DEGREES
EKG VENTRICULAR RATE: 90 BPM
EOSINOPHILS ABSOLUTE: 0.06 E9/L (ref 0.05–0.5)
EOSINOPHILS RELATIVE PERCENT: 0.6 % (ref 0–6)
HCT VFR BLD CALC: 35.5 % (ref 37–54)
HEMOGLOBIN: 11.7 G/DL (ref 12.5–16.5)
IMMATURE GRANULOCYTES #: 0.06 E9/L
IMMATURE GRANULOCYTES %: 0.6 % (ref 0–5)
LYMPHOCYTES ABSOLUTE: 1.05 E9/L (ref 1.5–4)
LYMPHOCYTES RELATIVE PERCENT: 10.5 % (ref 20–42)
MCH RBC QN AUTO: 30.1 PG (ref 26–35)
MCHC RBC AUTO-ENTMCNC: 33 % (ref 32–34.5)
MCV RBC AUTO: 91.3 FL (ref 80–99.9)
METER GLUCOSE: 155 MG/DL (ref 74–99)
METER GLUCOSE: 177 MG/DL (ref 74–99)
METER GLUCOSE: 265 MG/DL (ref 74–99)
METER GLUCOSE: 278 MG/DL (ref 74–99)
MONOCYTES ABSOLUTE: 0.81 E9/L (ref 0.1–0.95)
MONOCYTES RELATIVE PERCENT: 8.1 % (ref 2–12)
MRSA CULTURE ONLY: NORMAL
NEUTROPHILS ABSOLUTE: 7.98 E9/L (ref 1.8–7.3)
NEUTROPHILS RELATIVE PERCENT: 80 % (ref 43–80)
PDW BLD-RTO: 13.7 FL (ref 11.5–15)
PLATELET # BLD: 159 E9/L (ref 130–450)
PMV BLD AUTO: 10.1 FL (ref 7–12)
RBC # BLD: 3.89 E12/L (ref 3.8–5.8)
SEDIMENTATION RATE, ERYTHROCYTE: 50 MM/HR (ref 0–15)
WBC # BLD: 10 E9/L (ref 4.5–11.5)

## 2023-01-28 PROCEDURE — 36415 COLL VENOUS BLD VENIPUNCTURE: CPT

## 2023-01-28 PROCEDURE — 6370000000 HC RX 637 (ALT 250 FOR IP): Performed by: INTERNAL MEDICINE

## 2023-01-28 PROCEDURE — 6370000000 HC RX 637 (ALT 250 FOR IP): Performed by: NURSE PRACTITIONER

## 2023-01-28 PROCEDURE — 93010 ELECTROCARDIOGRAM REPORT: CPT | Performed by: INTERNAL MEDICINE

## 2023-01-28 PROCEDURE — 6370000000 HC RX 637 (ALT 250 FOR IP)

## 2023-01-28 PROCEDURE — 2060000000 HC ICU INTERMEDIATE R&B

## 2023-01-28 PROCEDURE — 82962 GLUCOSE BLOOD TEST: CPT

## 2023-01-28 PROCEDURE — 85025 COMPLETE CBC W/AUTO DIFF WBC: CPT

## 2023-01-28 PROCEDURE — 85651 RBC SED RATE NONAUTOMATED: CPT

## 2023-01-28 PROCEDURE — 99232 SBSQ HOSP IP/OBS MODERATE 35: CPT | Performed by: INTERNAL MEDICINE

## 2023-01-28 PROCEDURE — 86140 C-REACTIVE PROTEIN: CPT

## 2023-01-28 RX ORDER — HYDRALAZINE HYDROCHLORIDE 25 MG/1
25 TABLET, FILM COATED ORAL EVERY 8 HOURS SCHEDULED
Status: DISCONTINUED | OUTPATIENT
Start: 2023-01-28 | End: 2023-01-31

## 2023-01-28 RX ORDER — DEXTROSE MONOHYDRATE 100 MG/ML
INJECTION, SOLUTION INTRAVENOUS CONTINUOUS PRN
Status: DISCONTINUED | OUTPATIENT
Start: 2023-01-28 | End: 2023-01-31 | Stop reason: HOSPADM

## 2023-01-28 RX ADMIN — DIAZEPAM 5 MG: 5 TABLET ORAL at 20:46

## 2023-01-28 RX ADMIN — CLONIDINE HYDROCHLORIDE 0.2 MG: 0.2 TABLET ORAL at 09:27

## 2023-01-28 RX ADMIN — DIAZEPAM 5 MG: 5 TABLET ORAL at 09:27

## 2023-01-28 RX ADMIN — HYDRALAZINE HYDROCHLORIDE 25 MG: 25 TABLET, FILM COATED ORAL at 13:17

## 2023-01-28 RX ADMIN — CLONIDINE HYDROCHLORIDE 0.2 MG: 0.2 TABLET ORAL at 20:46

## 2023-01-28 RX ADMIN — ROSUVASTATIN CALCIUM 20 MG: 20 TABLET, FILM COATED ORAL at 20:47

## 2023-01-28 RX ADMIN — INSULIN LISPRO 4 UNITS: 100 INJECTION, SOLUTION INTRAVENOUS; SUBCUTANEOUS at 16:05

## 2023-01-28 RX ADMIN — METOPROLOL SUCCINATE 25 MG: 25 TABLET, EXTENDED RELEASE ORAL at 20:46

## 2023-01-28 RX ADMIN — METOPROLOL SUCCINATE 25 MG: 25 TABLET, EXTENDED RELEASE ORAL at 09:27

## 2023-01-28 RX ADMIN — HYDRALAZINE HYDROCHLORIDE 25 MG: 25 TABLET, FILM COATED ORAL at 20:46

## 2023-01-28 RX ADMIN — SERTRALINE 50 MG: 100 TABLET, FILM COATED ORAL at 09:27

## 2023-01-28 NOTE — PROGRESS NOTES
INPATIENT CARDIOLOGY FOLLOW-UP    Name: Adrianna Scott    Age: 81 y.o. Date of Admission: 1/26/2023  9:03 AM    Date of Service: 1/28/2023    Chief Complaint: Follow-up for New onset A. Fib    Interim History:  No new overnight cardiac complaints. Nausea  better and gets worse with solid food and better with clear liquids. Currently with no complaints of CP, SOB, palpitations, dizziness, or lightheadedness. SR on telemetry.     Review of Systems:   Cardiac: As per HPI  General: No fever, chills  Pulmonary: As per HPI  HEENT: No visual disturbances, difficult swallowing  GI: No nausea, vomiting  Endocrine: No thyroid disease or DM  Musculoskeletal: HALE x 4, no focal motor deficits  Skin: Intact, no rashes  Neuro/Psych: No headache or seizures    Problem List:  Patient Active Problem List   Diagnosis    Rhabdomyolysis    Delirium due to another medical condition    Diabetic foot infection ([facility] [address]    Osteomyelitis of great toe of left foot ([facility] [address]    DKA, type 1, not at goal [facility])    Diabetic acidosis without coma ([facility] [address]       Allergies:  No Known Allergies    Current Medications:  Current Facility-Administered Medications   Medication Dose Route Frequency Provider Last Rate Last Admin    perflutren lipid microspheres (DEFINITY) injection 1.5 mL  1.5 mL IntraVENous ONCE PRN Andres Sarmiento,         diazePAM (VALIUM) tablet 5 mg  5 mg Oral Daily Bruno Sarmiento DO   5 mg at 01/28/23 4481    sertraline (ZOLOFT) tablet 50 mg  50 mg Oral Daily Bruno Sarmiento DO   50 mg at 01/28/23 6037    cloNIDine (CATAPRES) tablet 0.2 mg  0.2 mg Oral BID Bayron Montes MD   0.2 mg at 01/28/23 0095    metoprolol succinate (TOPROL XL) extended release tablet 25 mg  25 mg Oral BID Shayy Pantelis, APRN - CNP   25 mg at 01/28/23 0927    rosuvastatin (CRESTOR) tablet 20 mg  20 mg Oral Nightly Shayy Pantelis, APRN - CNP   20 mg at 01/27/23 2051    hydrALAZINE (APRESOLINE) injection 10 mg  10 mg IntraVENous Q4H PRN Shayy Pantelis, APRN - CNP        insulin lispro (HUMALOG) injection vial 0-8 Units  0-8 Units SubCUTAneous TID WC Mary Moscow, DO   2 Units at 01/27/23 1200    insulin lispro (HUMALOG) injection vial 0-4 Units  0-4 Units SubCUTAneous Nightly Bradley Peña, DO        dextrose bolus 10% 125 mL  125 mL IntraVENous PRN Rosaline Read Toolson, DO        Or    dextrose bolus 10% 250 mL  250 mL IntraVENous PRN Bruno G Toolson, DO        dextrose 5 % and 0.45 % sodium chloride infusion   IntraVENous Continuous PRN Yeny Kenia,  mL/hr at 01/27/23 0721 New Bag at 01/27/23 0721    labetalol (NORMODYNE;TRANDATE) injection 10 mg  10 mg IntraVENous Q4H PRN Rosaline Read Toolson, DO   10 mg at 01/27/23 9945    trimethobenzamide (TIGAN) injection 200 mg  200 mg IntraMUSCular Q6H PRN Rosaline Read Toolson, DO   200 mg at 01/27/23 0908    phenol 1.4 % mouth spray 1 spray  1 spray Mouth/Throat Q2H PRN Yeny Kenia, DO   1 spray at 01/27/23 8312    diazePAM (VALIUM) tablet 5 mg  5 mg Oral Nightly PRN Rosaline Read Toolson, DO   5 mg at 01/27/23 2051      dextrose 5 % and 0.45 % NaCl 150 mL/hr at 01/27/23 6839       Physical Exam:  BP (!) 176/87   Pulse 76   Temp 99 °F (37.2 °C) (Oral)   Resp 18   Ht 5' 7\" (1.702 m)   Wt 163 lb 14.4 oz (74.3 kg)   SpO2 100%   BMI 25.67 kg/m²   Wt Readings from Last 3 Encounters:   01/28/23 163 lb 14.4 oz (74.3 kg)   06/08/22 177 lb (80.3 kg)   03/29/21 171 lb (77.6 kg)     Appearance: Awake, alert, no acute respiratory distress  Skin: Intact, no rash  Head: Normocephalic, atraumatic  Eyes: EOMI, no conjunctival erythema  ENMT: No pharyngeal erythema, MMM, no rhinorrhea  Neck: Supple, no elevated JVP, no carotid bruits  Lungs: Clear to auscultation bilaterally. No wheezes, rales, or rhonchi.   Cardiac: Regular rate and rhythm, +S1S2, no murmurs apparent  Abdomen: Soft, nontender, +bowel sounds  Extremities: Moves all extremities x 4, no lower extremity edema  Neurologic: No focal motor deficits apparent, normal mood and affect  Peripheral Pulses: Intact posterior tibial pulses bilaterally    Intake/Output:    Intake/Output Summary (Last 24 hours) at 1/28/2023 1024  Last data filed at 1/28/2023 0741  Gross per 24 hour   Intake --   Output 600 ml   Net -600 ml     I/O this shift:  In: -   Out: 450 [Urine:450]    Laboratory Tests:  Recent Labs     01/27/23  0002 01/27/23  0410 01/27/23  0805    135 135   K 3.2* 4.1 4.4   CL 97* 99 100   CO2 25 27 22   BUN 31* 30* 27*   CREATININE 1.9* 1.8* 1.7*   GLUCOSE 257* 108* 180*   CALCIUM 8.9 8.7 8.4*     Lab Results   Component Value Date/Time    MG 1.7 01/27/2023 08:05 AM     Recent Labs     01/26/23 0923   ALKPHOS 85   ALT 20   AST 41*   PROT 7.2   BILITOT 0.5   LABALBU 3.8     Recent Labs     01/26/23  0923 01/27/23  0410 01/28/23  0630   WBC 16.5* 19.2* 10.0   RBC 5.11 4.39 3.89   HGB 15.3 13.1 11.7*   HCT 43.7 38.8 35.5*   MCV 85.5 88.4 91.3   MCH 29.9 29.8 30.1   MCHC 35.0* 33.8 33.0   RDW 13.5 13.6 13.7    256 159   MPV 10.9 10.5 10.1     Lab Results   Component Value Date    CKTOTAL 147 01/15/2018    CKMB 12.4 (H) 01/10/2018    TROPONINI 0.07 (H) 01/10/2018    TROPONINI 0.10 (H) 01/09/2018    TROPONINI 0.13 (H) 01/09/2018     Lab Results   Component Value Date    INR 1.0 03/28/2021    INR 1.0 03/25/2021    PROTIME 10.9 03/28/2021    PROTIME 10.8 03/25/2021     Lab Results   Component Value Date    TSH 0.607 01/27/2023     Lab Results   Component Value Date    LABA1C 8.3 (H) 01/10/2018     No results found for: EAG  Lab Results   Component Value Date    CHOL 155 01/10/2018     Lab Results   Component Value Date    TRIG 338 (H) 01/10/2018     Lab Results   Component Value Date    HDL 29 01/10/2018     Lab Results   Component Value Date    LDLCALC 58 01/10/2018     Lab Results   Component Value Date    LABVLDL 68 01/10/2018     No results found for: CHOLHDLRATIO    Cardiac Tests:    Telemetry findings reviewed: SR at rate 80s with frequent PVC , no new tachy/bradyarrhythmias overnight Labs: creat 1.7, Hb 11.7, Resp panel positive for COVID    EKG:  Sinus tachycardia, nonspecific interventricular conduction delay, lateral infarct age undetermined, inferior infarct age undetermined abnormal EKG      Vitals and labs reviewed: as above. /87, HR 76, sats 100     TTE-1/10/2018: No significant valve disease   Normal left ventricle size and systolic function. Indeterminate diastolic function. No wall motion abnormalities. Ejection fraction is visually estimated at 65%. Mild concentric left ventricular hypertrophy. TTE- 1/17/23:  Left ventricle size is normal.   Ejection fraction is visually estimated at 50-55%. Overall ejection fraction is low normal . LV apex appears hypokinetic. Mild concentric left ventricular hypertrophy. Stage I diastolic dysfunction. Normal right ventricular size and function. TAPSE 19 mm. No hemodynamically significant aortic stenosis is present. Unable to estimate PA systolic pressure. No evidence for hemodynamically significant pericardial effusion. Assessment:  On reviewing rhythm strips and EKG no clear evidence of atrial fibrillation. Elevated troponin secondary to underlying GENIA, no evidence of ACS  Hypertension not well controlled,current /87  Diabetic ketoacidosis, improving. Type II diabetes on insulin with a peripheral neuropathy  Tobacco use disorder  Family history of premature  Hyperlipidemia on statin  History of depression  History of dementia  PAD with left toe amputation  Covid infection. Plan:   Reviewing rhythm strips and EKG were reviewed and there is is no evidence of atrial fibrillation. Rhythm strip showed frequent premature atrial complexes. Arrange 14-day Holter monitor/Zio patch prior to discharge. His blood pressure remains poorly controlled, add hydralazine 25 mg IV every 4 hours if the systolic blood pressure remains more than 140.   Poorly controlled blood pressure most likely from rebound hypertension from withdrawal of clonidine. Continue  clonidine 0.2 mg twice daily if he is able to take orally. Continue Toprol-XL 25 mg p.o. twice daily. Continue rosuvastatin 20 mg p.o. daily. Management of diabetic ketoacidosis as per primary service  Echo results reviewed as above, low normal LV function  He is otherwise stable from the cardiology standpoint, will see me as needed. More  than 35 minutes  was spent counseling the patient, reviewing the medications, results, assessment and the rationale for the above recommendations. Aleksey Varma MD., Demetrice Darnell.   [facility]) Cardiology

## 2023-01-28 NOTE — PROGRESS NOTES
Patient seen doing better. Heart rrr. Bp fair. Blood sugars doing okay.  Check inflammatory markers for covid

## 2023-01-28 NOTE — H&P
[place] [address]                              HISTORY AND PHYSICAL    PATIENT NAME: ILA RADFORD                   :        1950  MED REC NO:   [id number]                            ROOM:       0618  ACCOUNT NO:   [id number]                           ADMIT DATE: 2023  PROVIDER:     Devaughn Reed DO    HISTORY OF PRESENT ILLNESS:  This is a 72-year-old white male, presented  to the emergency department with a history and chief complaint of having  nausea and vomiting for a couple of days.  According to his story, he  thought that he had some hall that was not very good and he just got  this intractable nausea and vomiting.  He had been out of town.  His  brother had a stroke recently.  Not been eating very good.  He is  diabetic and he has been taking his medication and his insulin the way  he is supposed to take it.  He was actually traveling out in [place].   So, he had 130 sodium, 550 blood sugar, BUN elevated at 34, creatinine  at 2.1, 16,500 white count.  Lactic acid was 4.6.  PH was 7.51.   Beta-hydroxybutyrate was elevated at 433.  Troponins were elevated.   Started on some fluids and diabetic ketoacidosis protocol and  subsequently was admitted into the Intensive Care Unit for close  followup.  Chest x-ray failed to demonstrate any significant pathology.   CT of the abdomen showed no acute abnormalities.  Blood pressure was up  on admission.    SOCIAL HISTORY:  The patient is previous smoker.  Nondrinker.  Denies  use of narcotic drugs or alcoholic beverages.    FAMILY MEDICAL HISTORY:  Noted and discussed.    MEDICATIONS:  We reviewed his medications.    REVIEW OF SYSTEMS:  Negative for vertigo, cephalgia, ringing in the  ears, hearing loss, difficulty swallowing, hoarseness in his voice, or  bloody noses.  He has no chest pain, palpitations, orthopnea, or  paroxysmal nocturnal dyspnea.  He  has no cough, wheeze, shortness of  breath, or hemoptysis. He is nauseated. He has been vomiting. He has  abdominal discomfort. There is no blood in his stool. No diarrhea or  constipation. He has urgency and frequency. No dysuria. No hematuria. The endocrine system positive for diabetes, negative for thyroid  disorder. Musculoskeletal system is positive for degenerative joint  disease. Skin is without rash, eruptions, urticaria, or pruritus. Psychiatric system negative for anxiety or depression. Neurologic  system negative for CVA, seizures, tremors, tics, or TIAs. PHYSICAL EXAMINATION:  GENERAL:  Shows this to be a 28-year-old white male in moderate distress  with the above complaints. HEAD, EYES, EARS, NOSE, AND THROAT:  Examination shows the head to be  normocephalic and atraumatic. Pupils are equal and reactive to light  and accommodation. Extraocular eye muscles are intact. Tympanic  membranes are clear. Ear canals are patent. Oral mucosa is pink and  moist.  NECK:  Neck veins are flat. Nondistended. No carotid bruits. CHEST:  Symmetrical.  HEART:  Had a regular rate and rhythm without murmur, gallops, or  friction rub. LUNGS:  Clear to auscultation bilaterally without rhonchi, rales, or  wheezes. ABDOMEN:  Soft, nontender, nondistended. Bowel sounds are present in  all four quadrants. EXTERNAL GENITALIA:  Intact. EXTREMITIES:  Peripheral pulses are intact. Legs are without edema. SPINE:  Shows physiologic curve. IMPRESSION:  Initial impression at this time is diabetic ketoacidosis  without coma. We are going to bring him in, start him on some fluids,  check his electrolytes, follow his blood gases. We will have  intensivist see him in the Intensive Care Unit. Keep an eye on his  potassium, electrolytes, and follow his lactic acid along. At the time  of admission, his condition is fair. His prognosis is guarded.         Murtaza Powell DO    D: 01/27/2023 17:35:37       T: 01/27/2023 17:38:07     [email or web address]  Job#: [id number]     Doc#: [id number]    CC:

## 2023-01-29 LAB
ANION GAP SERPL CALCULATED.3IONS-SCNC: 11 MMOL/L (ref 7–16)
BASOPHILS ABSOLUTE: 0.03 E9/L (ref 0–0.2)
BASOPHILS RELATIVE PERCENT: 0.4 % (ref 0–2)
BUN BLDV-MCNC: 35 MG/DL (ref 6–23)
CALCIUM SERPL-MCNC: 7.4 MG/DL (ref 8.6–10.2)
CHLORIDE BLD-SCNC: 96 MMOL/L (ref 98–107)
CO2: 24 MMOL/L (ref 22–29)
CREAT SERPL-MCNC: 1.6 MG/DL (ref 0.7–1.2)
EOSINOPHILS ABSOLUTE: 0.11 E9/L (ref 0.05–0.5)
EOSINOPHILS RELATIVE PERCENT: 1.6 % (ref 0–6)
GFR SERPL CREATININE-BSD FRML MDRD: 46 ML/MIN/1.73
GLUCOSE BLD-MCNC: 236 MG/DL (ref 74–99)
HCT VFR BLD CALC: 33.5 % (ref 37–54)
HEMOGLOBIN: 11.2 G/DL (ref 12.5–16.5)
IMMATURE GRANULOCYTES #: 0.03 E9/L
IMMATURE GRANULOCYTES %: 0.4 % (ref 0–5)
LYMPHOCYTES ABSOLUTE: 1.01 E9/L (ref 1.5–4)
LYMPHOCYTES RELATIVE PERCENT: 14.7 % (ref 20–42)
MCH RBC QN AUTO: 29.9 PG (ref 26–35)
MCHC RBC AUTO-ENTMCNC: 33.4 % (ref 32–34.5)
MCV RBC AUTO: 89.6 FL (ref 80–99.9)
METER GLUCOSE: 179 MG/DL (ref 74–99)
METER GLUCOSE: 210 MG/DL (ref 74–99)
METER GLUCOSE: 224 MG/DL (ref 74–99)
METER GLUCOSE: 265 MG/DL (ref 74–99)
MONOCYTES ABSOLUTE: 0.59 E9/L (ref 0.1–0.95)
MONOCYTES RELATIVE PERCENT: 8.6 % (ref 2–12)
NEUTROPHILS ABSOLUTE: 5.12 E9/L (ref 1.8–7.3)
NEUTROPHILS RELATIVE PERCENT: 74.3 % (ref 43–80)
PDW BLD-RTO: 13.5 FL (ref 11.5–15)
PLATELET # BLD: 156 E9/L (ref 130–450)
PMV BLD AUTO: 10.2 FL (ref 7–12)
POTASSIUM SERPL-SCNC: 3.7 MMOL/L (ref 3.5–5)
RBC # BLD: 3.74 E12/L (ref 3.8–5.8)
SODIUM BLD-SCNC: 131 MMOL/L (ref 132–146)
WBC # BLD: 6.9 E9/L (ref 4.5–11.5)

## 2023-01-29 PROCEDURE — 85025 COMPLETE CBC W/AUTO DIFF WBC: CPT

## 2023-01-29 PROCEDURE — 6370000000 HC RX 637 (ALT 250 FOR IP)

## 2023-01-29 PROCEDURE — 6370000000 HC RX 637 (ALT 250 FOR IP): Performed by: INTERNAL MEDICINE

## 2023-01-29 PROCEDURE — 6370000000 HC RX 637 (ALT 250 FOR IP): Performed by: NURSE PRACTITIONER

## 2023-01-29 PROCEDURE — 80048 BASIC METABOLIC PNL TOTAL CA: CPT

## 2023-01-29 PROCEDURE — 36415 COLL VENOUS BLD VENIPUNCTURE: CPT

## 2023-01-29 PROCEDURE — 6360000002 HC RX W HCPCS: Performed by: NURSE PRACTITIONER

## 2023-01-29 PROCEDURE — 2060000000 HC ICU INTERMEDIATE R&B

## 2023-01-29 PROCEDURE — 82962 GLUCOSE BLOOD TEST: CPT

## 2023-01-29 PROCEDURE — 6370000000 HC RX 637 (ALT 250 FOR IP): Performed by: GENERAL PRACTICE

## 2023-01-29 PROCEDURE — 2580000003 HC RX 258: Performed by: GENERAL PRACTICE

## 2023-01-29 RX ORDER — SODIUM CHLORIDE 9 MG/ML
INJECTION, SOLUTION INTRAVENOUS CONTINUOUS
Status: DISCONTINUED | OUTPATIENT
Start: 2023-01-29 | End: 2023-01-31 | Stop reason: HOSPADM

## 2023-01-29 RX ORDER — PANTOPRAZOLE SODIUM 40 MG/1
40 TABLET, DELAYED RELEASE ORAL
Status: DISCONTINUED | OUTPATIENT
Start: 2023-01-30 | End: 2023-01-30

## 2023-01-29 RX ORDER — ACETAMINOPHEN 325 MG/1
650 TABLET ORAL EVERY 4 HOURS PRN
Status: DISCONTINUED | OUTPATIENT
Start: 2023-01-29 | End: 2023-01-31 | Stop reason: HOSPADM

## 2023-01-29 RX ADMIN — HYDRALAZINE HYDROCHLORIDE 10 MG: 20 INJECTION INTRAMUSCULAR; INTRAVENOUS at 06:04

## 2023-01-29 RX ADMIN — DIAZEPAM 5 MG: 5 TABLET ORAL at 09:12

## 2023-01-29 RX ADMIN — HYDRALAZINE HYDROCHLORIDE 25 MG: 25 TABLET, FILM COATED ORAL at 14:36

## 2023-01-29 RX ADMIN — METOPROLOL SUCCINATE 25 MG: 25 TABLET, EXTENDED RELEASE ORAL at 20:10

## 2023-01-29 RX ADMIN — INSULIN LISPRO 2 UNITS: 100 INJECTION, SOLUTION INTRAVENOUS; SUBCUTANEOUS at 06:04

## 2023-01-29 RX ADMIN — METOPROLOL SUCCINATE 25 MG: 25 TABLET, EXTENDED RELEASE ORAL at 09:12

## 2023-01-29 RX ADMIN — SERTRALINE 50 MG: 100 TABLET, FILM COATED ORAL at 09:12

## 2023-01-29 RX ADMIN — HYDRALAZINE HYDROCHLORIDE 25 MG: 25 TABLET, FILM COATED ORAL at 20:09

## 2023-01-29 RX ADMIN — DIAZEPAM 5 MG: 5 TABLET ORAL at 20:09

## 2023-01-29 RX ADMIN — ROSUVASTATIN CALCIUM 20 MG: 20 TABLET, FILM COATED ORAL at 20:10

## 2023-01-29 RX ADMIN — SODIUM CHLORIDE: 9 INJECTION, SOLUTION INTRAVENOUS at 14:36

## 2023-01-29 RX ADMIN — ACETAMINOPHEN 650 MG: 325 TABLET ORAL at 09:12

## 2023-01-29 RX ADMIN — CLONIDINE HYDROCHLORIDE 0.2 MG: 0.2 TABLET ORAL at 20:10

## 2023-01-29 RX ADMIN — INSULIN LISPRO 2 UNITS: 100 INJECTION, SOLUTION INTRAVENOUS; SUBCUTANEOUS at 11:14

## 2023-01-29 RX ADMIN — CLONIDINE HYDROCHLORIDE 0.2 MG: 0.2 TABLET ORAL at 09:12

## 2023-01-30 LAB
AMYLASE: 51 U/L (ref 20–100)
ANION GAP SERPL CALCULATED.3IONS-SCNC: 12 MMOL/L (ref 7–16)
BUN BLDV-MCNC: 34 MG/DL (ref 6–23)
CALCIUM SERPL-MCNC: 7.6 MG/DL (ref 8.6–10.2)
CHLORIDE BLD-SCNC: 101 MMOL/L (ref 98–107)
CO2: 22 MMOL/L (ref 22–29)
CREAT SERPL-MCNC: 1.4 MG/DL (ref 0.7–1.2)
GFR SERPL CREATININE-BSD FRML MDRD: 54 ML/MIN/1.73
GLUCOSE BLD-MCNC: 217 MG/DL (ref 74–99)
LIPASE: 39 U/L (ref 13–60)
METER GLUCOSE: 181 MG/DL (ref 74–99)
METER GLUCOSE: 219 MG/DL (ref 74–99)
METER GLUCOSE: 239 MG/DL (ref 74–99)
METER GLUCOSE: 269 MG/DL (ref 74–99)
POTASSIUM SERPL-SCNC: 3.8 MMOL/L (ref 3.5–5)
SODIUM BLD-SCNC: 135 MMOL/L (ref 132–146)

## 2023-01-30 PROCEDURE — 80048 BASIC METABOLIC PNL TOTAL CA: CPT

## 2023-01-30 PROCEDURE — 6370000000 HC RX 637 (ALT 250 FOR IP): Performed by: NURSE PRACTITIONER

## 2023-01-30 PROCEDURE — 83690 ASSAY OF LIPASE: CPT

## 2023-01-30 PROCEDURE — 99233 SBSQ HOSP IP/OBS HIGH 50: CPT | Performed by: INTERNAL MEDICINE

## 2023-01-30 PROCEDURE — 6370000000 HC RX 637 (ALT 250 FOR IP)

## 2023-01-30 PROCEDURE — 6370000000 HC RX 637 (ALT 250 FOR IP): Performed by: INTERNAL MEDICINE

## 2023-01-30 PROCEDURE — 99222 1ST HOSP IP/OBS MODERATE 55: CPT | Performed by: SURGERY

## 2023-01-30 PROCEDURE — 82962 GLUCOSE BLOOD TEST: CPT

## 2023-01-30 PROCEDURE — 6370000000 HC RX 637 (ALT 250 FOR IP): Performed by: GENERAL PRACTICE

## 2023-01-30 PROCEDURE — 36415 COLL VENOUS BLD VENIPUNCTURE: CPT

## 2023-01-30 PROCEDURE — 82150 ASSAY OF AMYLASE: CPT

## 2023-01-30 PROCEDURE — 2060000000 HC ICU INTERMEDIATE R&B

## 2023-01-30 PROCEDURE — 6370000000 HC RX 637 (ALT 250 FOR IP): Performed by: STUDENT IN AN ORGANIZED HEALTH CARE EDUCATION/TRAINING PROGRAM

## 2023-01-30 RX ORDER — INSULIN GLARGINE 100 [IU]/ML
10 INJECTION, SOLUTION SUBCUTANEOUS NIGHTLY
Status: DISCONTINUED | OUTPATIENT
Start: 2023-01-30 | End: 2023-01-31

## 2023-01-30 RX ORDER — PANTOPRAZOLE SODIUM 40 MG/1
40 TABLET, DELAYED RELEASE ORAL
Status: DISCONTINUED | OUTPATIENT
Start: 2023-01-30 | End: 2023-01-31 | Stop reason: HOSPADM

## 2023-01-30 RX ADMIN — METOPROLOL SUCCINATE 25 MG: 25 TABLET, EXTENDED RELEASE ORAL at 08:13

## 2023-01-30 RX ADMIN — HYDRALAZINE HYDROCHLORIDE 25 MG: 25 TABLET, FILM COATED ORAL at 06:16

## 2023-01-30 RX ADMIN — PANTOPRAZOLE SODIUM 40 MG: 40 TABLET, DELAYED RELEASE ORAL at 06:14

## 2023-01-30 RX ADMIN — ROSUVASTATIN CALCIUM 20 MG: 20 TABLET, FILM COATED ORAL at 20:29

## 2023-01-30 RX ADMIN — HYDRALAZINE HYDROCHLORIDE 25 MG: 25 TABLET, FILM COATED ORAL at 14:39

## 2023-01-30 RX ADMIN — INSULIN LISPRO 2 UNITS: 100 INJECTION, SOLUTION INTRAVENOUS; SUBCUTANEOUS at 11:14

## 2023-01-30 RX ADMIN — PANTOPRAZOLE SODIUM 40 MG: 40 TABLET, DELAYED RELEASE ORAL at 17:01

## 2023-01-30 RX ADMIN — INSULIN LISPRO 2 UNITS: 100 INJECTION, SOLUTION INTRAVENOUS; SUBCUTANEOUS at 06:12

## 2023-01-30 RX ADMIN — CLONIDINE HYDROCHLORIDE 0.2 MG: 0.2 TABLET ORAL at 08:13

## 2023-01-30 RX ADMIN — SERTRALINE 50 MG: 100 TABLET, FILM COATED ORAL at 08:13

## 2023-01-30 RX ADMIN — HYDRALAZINE HYDROCHLORIDE 25 MG: 25 TABLET, FILM COATED ORAL at 20:29

## 2023-01-30 RX ADMIN — CLONIDINE HYDROCHLORIDE 0.2 MG: 0.2 TABLET ORAL at 20:29

## 2023-01-30 RX ADMIN — INSULIN LISPRO 4 UNITS: 100 INJECTION, SOLUTION INTRAVENOUS; SUBCUTANEOUS at 17:01

## 2023-01-30 RX ADMIN — METOPROLOL SUCCINATE 25 MG: 25 TABLET, EXTENDED RELEASE ORAL at 20:29

## 2023-01-30 RX ADMIN — DIAZEPAM 5 MG: 5 TABLET ORAL at 08:13

## 2023-01-30 RX ADMIN — INSULIN GLARGINE 10 UNITS: 100 INJECTION, SOLUTION SUBCUTANEOUS at 20:30

## 2023-01-30 ASSESSMENT — ENCOUNTER SYMPTOMS
COUGH: 0
CHEST TIGHTNESS: 0
ABDOMINAL PAIN: 0
NAUSEA: 0
COLOR CHANGE: 0
VOMITING: 0
ABDOMINAL DISTENTION: 0

## 2023-01-30 ASSESSMENT — PAIN SCALES - GENERAL
PAINLEVEL_OUTOF10: 0

## 2023-01-30 NOTE — CARE COORDINATION
Social work / Discharge Planning:          [address]. Patient is independent from home. Plan is to return home when medically stable. Will need scripts for glucometer, lancets and testing strips for discharge.      Electronically signed by CRISTI Eddy on 1/30/2023 at 1:58 PM

## 2023-01-30 NOTE — ACP (ADVANCE CARE PLANNING)
Advance Care Planning     Advance Care Planning Activator (Inpatient)  Conversation Note      Date of ACP Conversation: 1/30/2023     Conversation Conducted with: Patient with Decision Making Capacity    ACP Activator: Severnio Nuñez, [address] [facility] Decision Maker:     Current Designated Health Care Decision Maker:     Primary Decision Maker: Gregorio Yury - Brother/Sister - [phone]    Secondary Decision Maker: Kim [facility] [phone]  Click here to complete Healthcare Decision Makers including section of the Healthcare Decision Maker Relationship (ie \"Primary\")  Today we documented Decision Maker(s) consistent with Legal Next of Kin hierarchy. Care Preferences    Ventilation: \"If you were in your present state of health and suddenly became very ill and were unable to breathe on your own, what would your preference be about the use of a ventilator (breathing machine) if it were available to you? \"      Would the patient desire the use of ventilator (breathing machine)?: yes    \"If your health worsens and it becomes clear that your chance of recovery is unlikely, what would your preference be about the use of a ventilator (breathing machine) if it were available to you? \"     Would the patient desire the use of ventilator (breathing machine)?: Yes      Resuscitation  \"CPR works best to restart the heart when there is a sudden event, like a heart attack, in someone who is otherwise healthy. Unfortunately, CPR does not typically restart the heart for people who have serious health conditions or who are very sick. \"    \"In the event your heart stopped as a result of an underlying serious health condition, would you want attempts to be made to restart your heart (answer \"yes\" for attempt to resuscitate) or would you prefer a natural death (answer \"no\" for do not attempt to resuscitate)? \" yes       [] Yes   [] No   Educated Patient / Decision Maker regarding differences between Advance Directives and portable DNR orders.     Length of ACP Conversation in minutes:      Conversation Outcomes:  [x] ACP discussion completed  [] Existing advance directive reviewed with patient; no changes to patient's previously recorded wishes  [] New Advance Directive completed  [] Portable Do Not Rescitate prepared for Provider review and signature  [] POLST/POST/MOLST/MOST prepared for Provider review and signature      Follow-up plan:    [] Schedule follow-up conversation to continue planning  [] Referred individual to Provider for additional questions/concerns   [] Advised patient/agent/surrogate to review completed ACP document and update if needed with changes in condition, patient preferences or care setting    [] This note routed to one or more involved healthcare providers

## 2023-01-30 NOTE — CONSULTS
GENERAL SURGERY  CONSULT NOTE  1/30/2023    Physician Consulted: Dr. Saúl Wilks  CC: abdominal pain  Referring Physician: Dr. Severo Mings HPI  Pedro Bragg is a 81 y.o. male with a past medical hx of dementia, depression, DM II, HTN who presented on 1/26 for evaluation of abdominal pain with associated nausea and vomiting. He had not been able to keep very much down. Patient was found to be in DKA. This has been corrected and he states his nausea and vomiting has improved. He denies any nausea, vomiting, chest pain or shortness of breath. He has not been having any fevers or chills. He still has a bit of reflux symptoms but overall is much improved compared to when he presented. Past Medical History:   Diagnosis Date    Dementia ([facility] [address]     Depression     Diabetes mellitus ([facility] [address]     Hypertension     Neuropathy        Past Surgical History:   Procedure Laterality Date    BACK SURGERY      FOOT DEBRIDEMENT Left 3/25/2021    BONE BIOPSY AND CULTURE, GREAT TOE LEFT FOOT performed by Yoandy Hidalgo DPM at [address] [place] [address] Left 3/28/2021    LEFT FOOT TOE AMPUTATION,  LEFT FOOT PARTIAL HALLUX AMPUTATION WITH DEBRIDEMENT OF NON VIABLE TISSUE performed by Yoandy Hidalgo DPM at [facility] OR       Medications Prior to Admission    Prior to Admission medications    Medication Sig Start Date End Date Taking? Authorizing Provider   insulin glargine (LANTUS) 100 UNIT/ML injection vial Inject 30 Units into the skin nightly    Historical Provider, MD   diazePAM (VALIUM) 5 MG tablet Take 5 mg by mouth daily as needed for Anxiety.     Historical Provider, MD   aspirin 81 MG EC tablet Take 81 mg by mouth every morning    Historical Provider, MD   SITagliptin (JANUVIA) 50 MG tablet Take 50 mg by mouth Daily with lunch     Historical Provider, MD   metFORMIN (GLUCOPHAGE) 500 MG tablet Take 500 mg by mouth 2 times daily (with meals)    Historical Provider, MD   sertraline (ZOLOFT) 50 MG tablet Take 50 mg by mouth every morning    Historical Provider, MD   cloNIDine (CATAPRES) 0.2 MG tablet Take 1 tablet by mouth 2 times daily 7/6/18   Shireen Roberto,    Ergocalciferol (VITAMIN D2) 2000 units TABS Take 2,000 Units by mouth Daily with lunch     Historical Provider, MD   hydrOXYzine (ATARAX) 25 MG tablet Take 25 mg by mouth nightly    Historical Provider, MD   magnesium oxide (MAG-OX) 400 (240 Mg) MG tablet Take 1 tablet by mouth 2 times daily 1/16/18   Shireen Roberto DO   famotidine (PEPCID) 20 MG tablet Take 1 tablet by mouth 2 times daily 1/16/18   Shireen Roberto, DO   glipiZIDE (GLUCOTROL) 10 MG tablet Take 10 mg by mouth 2 times daily (before meals)    Historical Provider, MD   rosuvastatin (CRESTOR) 40 MG tablet Take 40 mg by mouth nightly     Historical Provider, MD   fenofibrate (TRICOR) 145 MG tablet Take 145 mg by mouth every morning    Historical Provider, MD       No Known Allergies    Family history: no GI cancer    Social History     Tobacco Use    Smoking status: Some Days     Packs/day: 0.25     Types: Cigarettes    Smokeless tobacco: Never   Substance Use Topics    Alcohol use: No    Drug use: No         Review of Systems:   Review of Systems   Constitutional:  Negative for chills, fatigue and fever. Respiratory:  Negative for cough and chest tightness. Cardiovascular:  Negative for chest pain. Gastrointestinal:  Negative for abdominal distention, abdominal pain, nausea and vomiting. Genitourinary:  Negative for difficulty urinating. Skin:  Negative for color change. Neurological:  Negative for dizziness. PHYSICAL EXAM:    Vitals:    01/30/23 0616   BP: (!) 173/77   Pulse:    Resp:    Temp:    SpO2:        GENERAL:  NAD. A&Ox3. HEAD:  Normocephalic. Atraumatic. EYES:   No scleral icterus. PERRL. LUNGS: no acute respiratory distress. On room air  CARDIOVASCULAR: Hemodynamically stable, regular rate  ABDOMEN:  Soft, non-distended, non-tender.  No guarding, rigidity, rebound. Musculoskeletal:   MAEx4. Atraumatic. No LE edema. SKIN:  Warm and dry  NEUROLOGIC:  No focal neurologic deficits    ASSESSMENT/PLAN:  81 y.o. male with DKA and COVID 19    Plan  -recommend initiation of PPI for GERD like symptoms   -abdominal pain and GI symptoms appear to be 2/2 DKA   -no acute surgical intervention at this time   -continue DM management per primary team   -ok for diet as tolerated from surgery standpoint     Plan will be discussed with Dr. Tia Brandt. Gallito Goncalves MD  Surgery Resident PGY-2  1/30/2023  7:03 AM    I saw and examined the patient. I reviewed the above resident's note. I reviewed all labs, radiology, and all test results listed above. I agree with the assessment and plan as outlined. No abdominal pain at this time. General diet today  Bowel regimen.     Tia Brandt MD  General Surgery

## 2023-01-30 NOTE — PROGRESS NOTES
Patient seen not horribly symptomatic from covid. Blood sugars fair. Start lantus tonight. Abd better .  Tolerating ppi

## 2023-01-31 VITALS
DIASTOLIC BLOOD PRESSURE: 85 MMHG | RESPIRATION RATE: 18 BRPM | TEMPERATURE: 99 F | WEIGHT: 170.5 LBS | BODY MASS INDEX: 26.76 KG/M2 | SYSTOLIC BLOOD PRESSURE: 176 MMHG | OXYGEN SATURATION: 97 % | HEART RATE: 84 BPM | HEIGHT: 67 IN

## 2023-01-31 LAB
ANION GAP SERPL CALCULATED.3IONS-SCNC: 10 MMOL/L (ref 7–16)
BUN BLDV-MCNC: 24 MG/DL (ref 6–23)
CALCIUM SERPL-MCNC: 8 MG/DL (ref 8.6–10.2)
CHLORIDE BLD-SCNC: 103 MMOL/L (ref 98–107)
CO2: 21 MMOL/L (ref 22–29)
CREAT SERPL-MCNC: 1.3 MG/DL (ref 0.7–1.2)
GFR SERPL CREATININE-BSD FRML MDRD: 59 ML/MIN/1.73
GLUCOSE BLD-MCNC: 253 MG/DL (ref 74–99)
METER GLUCOSE: 228 MG/DL (ref 74–99)
METER GLUCOSE: 265 MG/DL (ref 74–99)
POTASSIUM SERPL-SCNC: 3.8 MMOL/L (ref 3.5–5)
SODIUM BLD-SCNC: 134 MMOL/L (ref 132–146)

## 2023-01-31 PROCEDURE — 80048 BASIC METABOLIC PNL TOTAL CA: CPT

## 2023-01-31 PROCEDURE — 36415 COLL VENOUS BLD VENIPUNCTURE: CPT

## 2023-01-31 PROCEDURE — 6370000000 HC RX 637 (ALT 250 FOR IP)

## 2023-01-31 PROCEDURE — 6370000000 HC RX 637 (ALT 250 FOR IP): Performed by: INTERNAL MEDICINE

## 2023-01-31 PROCEDURE — 2580000003 HC RX 258: Performed by: GENERAL PRACTICE

## 2023-01-31 PROCEDURE — 6370000000 HC RX 637 (ALT 250 FOR IP): Performed by: NURSE PRACTITIONER

## 2023-01-31 PROCEDURE — 82962 GLUCOSE BLOOD TEST: CPT

## 2023-01-31 PROCEDURE — 6370000000 HC RX 637 (ALT 250 FOR IP): Performed by: STUDENT IN AN ORGANIZED HEALTH CARE EDUCATION/TRAINING PROGRAM

## 2023-01-31 RX ORDER — BLOOD-GLUCOSE METER
1 KIT MISCELLANEOUS DAILY
Qty: 1 KIT | Refills: 0 | Status: SHIPPED | OUTPATIENT
Start: 2023-01-31

## 2023-01-31 RX ORDER — INSULIN GLARGINE 100 [IU]/ML
20 INJECTION, SOLUTION SUBCUTANEOUS NIGHTLY
Qty: 10 ML | Refills: 3 | Status: SHIPPED | OUTPATIENT
Start: 2023-01-31

## 2023-01-31 RX ORDER — HYDRALAZINE HYDROCHLORIDE 50 MG/1
50 TABLET, FILM COATED ORAL EVERY 8 HOURS SCHEDULED
Qty: 90 TABLET | Refills: 3 | Status: SHIPPED | OUTPATIENT
Start: 2023-01-31

## 2023-01-31 RX ORDER — PANTOPRAZOLE SODIUM 40 MG/1
40 TABLET, DELAYED RELEASE ORAL
Qty: 30 TABLET | Refills: 3 | Status: SHIPPED | OUTPATIENT
Start: 2023-01-31

## 2023-01-31 RX ORDER — METOPROLOL SUCCINATE 25 MG/1
25 TABLET, EXTENDED RELEASE ORAL 2 TIMES DAILY
Qty: 30 TABLET | Refills: 3 | Status: SHIPPED | OUTPATIENT
Start: 2023-01-31

## 2023-01-31 RX ORDER — DIAZEPAM 5 MG/1
5 TABLET ORAL 2 TIMES DAILY
Qty: 60 TABLET | Refills: 0 | Status: SHIPPED | OUTPATIENT
Start: 2023-01-31 | End: 2023-02-10

## 2023-01-31 RX ORDER — LANCETS 30 GAUGE
1 EACH MISCELLANEOUS DAILY
Qty: 300 EACH | Refills: 1 | Status: SHIPPED | OUTPATIENT
Start: 2023-01-31

## 2023-01-31 RX ORDER — IBUPROFEN 200 MG
1 TABLET ORAL DAILY
Qty: 100 EACH | Refills: 3 | Status: SHIPPED | OUTPATIENT
Start: 2023-01-31

## 2023-01-31 RX ORDER — INSULIN GLARGINE 100 [IU]/ML
20 INJECTION, SOLUTION SUBCUTANEOUS NIGHTLY
Status: DISCONTINUED | OUTPATIENT
Start: 2023-01-31 | End: 2023-01-31 | Stop reason: HOSPADM

## 2023-01-31 RX ORDER — HYDRALAZINE HYDROCHLORIDE 50 MG/1
50 TABLET, FILM COATED ORAL EVERY 8 HOURS SCHEDULED
Status: DISCONTINUED | OUTPATIENT
Start: 2023-01-31 | End: 2023-01-31 | Stop reason: HOSPADM

## 2023-01-31 RX ORDER — GLUCOSAMINE HCL/CHONDROITIN SU 500-400 MG
CAPSULE ORAL
Qty: 120 STRIP | Refills: 1 | Status: SHIPPED | OUTPATIENT
Start: 2023-01-31

## 2023-01-31 RX ADMIN — PANTOPRAZOLE SODIUM 40 MG: 40 TABLET, DELAYED RELEASE ORAL at 06:16

## 2023-01-31 RX ADMIN — SODIUM CHLORIDE: 9 INJECTION, SOLUTION INTRAVENOUS at 05:51

## 2023-01-31 RX ADMIN — DIAZEPAM 5 MG: 5 TABLET ORAL at 07:57

## 2023-01-31 RX ADMIN — CLONIDINE HYDROCHLORIDE 0.2 MG: 0.2 TABLET ORAL at 07:56

## 2023-01-31 RX ADMIN — INSULIN LISPRO 2 UNITS: 100 INJECTION, SOLUTION INTRAVENOUS; SUBCUTANEOUS at 06:11

## 2023-01-31 RX ADMIN — METOPROLOL SUCCINATE 25 MG: 25 TABLET, EXTENDED RELEASE ORAL at 07:56

## 2023-01-31 RX ADMIN — SERTRALINE 50 MG: 100 TABLET, FILM COATED ORAL at 07:56

## 2023-01-31 RX ADMIN — INSULIN LISPRO 4 UNITS: 100 INJECTION, SOLUTION INTRAVENOUS; SUBCUTANEOUS at 11:03

## 2023-01-31 RX ADMIN — HYDRALAZINE HYDROCHLORIDE 25 MG: 25 TABLET, FILM COATED ORAL at 06:06

## 2023-01-31 ASSESSMENT — PAIN SCALES - GENERAL: PAINLEVEL_OUTOF10: 0

## 2023-01-31 NOTE — PLAN OF CARE
Problem: Discharge Planning  Goal: Discharge to home or other facility with appropriate resources  Outcome: Progressing     Problem: Pain  Goal: Verbalizes/displays adequate comfort level or baseline comfort level  Outcome: Progressing     Problem: Safety - Adult  Goal: Free from fall injury  Outcome: Progressing  Flowsheets (Taken 1/30/2023 1134 by Fannie Lopez RN)  Free From Fall Injury: Instruct family/caregiver on patient safety     Problem: Skin/Tissue Integrity  Goal: Absence of new skin breakdown  Description: 1. Monitor for areas of redness and/or skin breakdown  2. Assess vascular access sites hourly  3. Every 4-6 hours minimum:  Change oxygen saturation probe site  4. Every 4-6 hours:  If on nasal continuous positive airway pressure, respiratory therapy assess nares and determine need for appliance change or resting period.   Outcome: Progressing     Problem: Chronic Conditions and Co-morbidities  Goal: Patient's chronic conditions and co-morbidity symptoms are monitored and maintained or improved  Outcome: Progressing     Problem: Respiratory - Adult  Goal: Achieves optimal ventilation and oxygenation  Outcome: Progressing     Problem: Gastrointestinal - Adult  Goal: Minimal or absence of nausea and vomiting  Outcome: Progressing  Goal: Maintains or returns to baseline bowel function  Outcome: Progressing  Goal: Maintains adequate nutritional intake  Outcome: Progressing  Goal: Establish and maintain optimal ostomy function  Outcome: Progressing     Problem: Discharge Planning  Goal: Discharge to home or other facility with appropriate resources  Outcome: Progressing     Problem: Pain  Goal: Verbalizes/displays adequate comfort level or baseline comfort level  Outcome: Progressing     Problem: Safety - Adult  Goal: Free from fall injury  Outcome: Progressing  Flowsheets (Taken 1/30/2023 1134 by Fannie Lopez RN)  Free From Fall Injury: Instruct family/caregiver on patient safety     Problem: Skin/Tissue Integrity  Goal: Absence of new skin breakdown  Description: 1. Monitor for areas of redness and/or skin breakdown  2. Assess vascular access sites hourly  3. Every 4-6 hours minimum:  Change oxygen saturation probe site  4. Every 4-6 hours:  If on nasal continuous positive airway pressure, respiratory therapy assess nares and determine need for appliance change or resting period.   Outcome: Progressing     Problem: Chronic Conditions and Co-morbidities  Goal: Patient's chronic conditions and co-morbidity symptoms are monitored and maintained or improved  Outcome: Progressing     Problem: Respiratory - Adult  Goal: Achieves optimal ventilation and oxygenation  Outcome: Progressing     Problem: Gastrointestinal - Adult  Goal: Minimal or absence of nausea and vomiting  Outcome: Progressing     Problem: Gastrointestinal - Adult  Goal: Maintains or returns to baseline bowel function  Outcome: Progressing     Problem: Gastrointestinal - Adult  Goal: Maintains adequate nutritional intake  Outcome: Progressing     Problem: Gastrointestinal - Adult  Goal: Establish and maintain optimal ostomy function  Outcome: Progressing

## 2023-01-31 NOTE — PROGRESS NOTES
INPATIENT CARDIOLOGY Follow UP    Name: Bobby Ingram    Age: 81 y.o. Date of Admission: 1/26/2023  9:03 AM    Date of Service: 1/30/2023      Referring Physician: Ac Munguia DO        Interim:  Visit was done remotely due to ongoing treatment for COVID-19. No significant event overnight. Blood pressure is not acutely controlled. Please consider weaning off clonidine and changing metoprolol succinate to carvedilol for optimal blood pressure control. Past Medical History:  Past Medical History:   Diagnosis Date    Dementia ([facility] [address]     Depression     Diabetes mellitus ([facility] [address]     Hypertension     Neuropathy        Past Surgical History:  Past Surgical History:   Procedure Laterality Date    BACK SURGERY      FOOT DEBRIDEMENT Left 3/25/2021    BONE BIOPSY AND CULTURE, GREAT TOE LEFT FOOT performed by Nikhil Khan DPM at [place] [id number] Left 3/28/2021    LEFT FOOT TOE AMPUTATION,  LEFT FOOT PARTIAL HALLUX AMPUTATION WITH DEBRIDEMENT OF NON VIABLE TISSUE performed by Nikhil Khan DPM at [facility] OR       Family History:  No family history on file.     Social History:  Social History     Socioeconomic History    Marital status: Legally      Spouse name: Not on file    Number of children: Not on file    Years of education: Not on file    Highest education level: Not on file   Occupational History    Not on file   Tobacco Use    Smoking status: Some Days     Packs/day: 0.25     Types: Cigarettes    Smokeless tobacco: Never   Substance and Sexual Activity    Alcohol use: No    Drug use: No    Sexual activity: Not on file   Other Topics Concern    Not on file   Social History Narrative    Not on file     Social Determinants of Health     Financial Resource Strain: Not on file   Food Insecurity: Not on file   Transportation Needs: Not on file   Physical Activity: Not on file   Stress: Not on file   Social Connections: Not on file   Intimate Partner Violence: Not on file   Housing Stability: Not on file       Allergies:  No Known Allergies    Home Medications:  Prior to Admission medications    Medication Sig Start Date End Date Taking? Authorizing Provider   insulin glargine (LANTUS) 100 UNIT/ML injection vial Inject 30 Units into the skin nightly    Historical Provider, MD   diazePAM (VALIUM) 5 MG tablet Take 5 mg by mouth daily as needed for Anxiety.     Historical Provider, MD   aspirin 81 MG EC tablet Take 81 mg by mouth every morning    Historical Provider, MD   SITagliptin (JANUVIA) 50 MG tablet Take 50 mg by mouth Daily with lunch     Historical Provider, MD   metFORMIN (GLUCOPHAGE) 500 MG tablet Take 500 mg by mouth 2 times daily (with meals)    Historical Provider, MD   sertraline (ZOLOFT) 50 MG tablet Take 50 mg by mouth every morning    Historical Provider, MD   cloNIDine (CATAPRES) 0.2 MG tablet Take 1 tablet by mouth 2 times daily 7/6/18   Ernestina Tinoco DO   Ergocalciferol (VITAMIN D2) 2000 units TABS Take 2,000 Units by mouth Daily with lunch     Historical Provider, MD   hydrOXYzine (ATARAX) 25 MG tablet Take 25 mg by mouth nightly    Historical Provider, MD   magnesium oxide (MAG-OX) 400 (240 Mg) MG tablet Take 1 tablet by mouth 2 times daily 1/16/18   Ernestina Tinoco DO   famotidine (PEPCID) 20 MG tablet Take 1 tablet by mouth 2 times daily 1/16/18   Ernestina Tinoco,    glipiZIDE (GLUCOTROL) 10 MG tablet Take 10 mg by mouth 2 times daily (before meals)    Historical Provider, MD   rosuvastatin (CRESTOR) 40 MG tablet Take 40 mg by mouth nightly     Historical Provider, MD   fenofibrate (TRICOR) 145 MG tablet Take 145 mg by mouth every morning    Historical Provider, MD       Current Medications:  Current Facility-Administered Medications   Medication Dose Route Frequency Provider Last Rate Last Admin    pantoprazole (PROTONIX) tablet 40 mg  40 mg Oral BID AC Roberto Varghese DO   40 mg at 01/30/23 1701    insulin glargine (LANTUS) injection vial 10 Units  10 Units SubCUTAneous Nightly Joy Tony, DO   10 Units at 01/30/23 2030    acetaminophen (TYLENOL) tablet 650 mg  650 mg Oral Q4H PRN Joy Tony, DO   650 mg at 01/29/23 0912    0.9 % sodium chloride infusion   IntraVENous Continuous Joy Tony, DO 75 mL/hr at 01/29/23 1436 New Bag at 01/29/23 1436    hydrALAZINE (APRESOLINE) tablet 25 mg  25 mg Oral 3 times per day Shwetha Galaviz MD   25 mg at 01/30/23 2029    glucose chewable tablet 16 g  4 tablet Oral PRN Joy Tony, DO        dextrose bolus 10% 125 mL  125 mL IntraVENous PRN Joy Tony, DO        Or    dextrose bolus 10% 250 mL  250 mL IntraVENous PRN Joy Tony, DO        glucagon (rDNA) injection 1 mg  1 mg SubCUTAneous PRN Joy Tony, DO        dextrose 10 % infusion   IntraVENous Continuous PRN Joy Tony, DO        perflutren lipid microspheres (DEFINITY) injection 1.5 mL  1.5 mL IntraVENous ONCE PRN Amos Lizzy Toolson, DO        diazePAM (VALIUM) tablet 5 mg  5 mg Oral Daily Amos Lizzy Toolson, DO   5 mg at 01/30/23 0813    sertraline (ZOLOFT) tablet 50 mg  50 mg Oral Daily [place] Lizzy Toolson, DO   50 mg at 01/30/23 0813    cloNIDine (CATAPRES) tablet 0.2 mg  0.2 mg Oral BID Shwetha Galaviz MD   0.2 mg at 01/30/23 2029    metoprolol succinate (TOPROL XL) extended release tablet 25 mg  25 mg Oral BID Arlon Kehr, APRN - CNP   25 mg at 01/30/23 2029    rosuvastatin (CRESTOR) tablet 20 mg  20 mg Oral Nightly Shayy Pantelis, APRN - CNP   20 mg at 01/30/23 2029    hydrALAZINE (APRESOLINE) injection 10 mg  10 mg IntraVENous Q4H PRN Arlon Kehr, APRN - CNP   10 mg at 01/29/23 0604    insulin lispro (HUMALOG) injection vial 0-8 Units  0-8 Units SubCUTAneous TID MICHAEL Madison DO   4 Units at 01/30/23 1701    insulin lispro (HUMALOG) injection vial 0-4 Units  0-4 Units SubCUTAneous Nightly Bradley Peña DO        labetalol (NORMODYNE;TRANDATE) injection 10 mg  10 mg IntraVENous Q4H PRN Amos Sarmiento DO   10 mg at 01/27/23 9517    trimethobenzamide Paresh Delgadillo injection 200 mg  200 mg IntraMUSCular Q6H PRN Basil Tarik Houseron, DO   200 mg at 01/27/23 0908    phenol 1.4 % mouth spray 1 spray  1 spray Mouth/Throat Q2H PRN Radha Elena, DO   1 spray at 01/27/23 1383    diazePAM (VALIUM) tablet 5 mg  5 mg Oral Nightly PRN Basil Keithkrys Houseron, DO   5 mg at 01/29/23 2009      sodium chloride 75 mL/hr at 01/29/23 1436    dextrose         Physical Exam:  BP (!) 176/82   Pulse 90   Temp 99.2 °F (37.3 °C) (Oral)   Resp 18   Ht 5' 7\" (1.702 m)   Wt 173 lb 11.6 oz (78.8 kg)   SpO2 97%   BMI 27.21 kg/m²   Wt Readings from Last 3 Encounters:   01/30/23 173 lb 11.6 oz (78.8 kg)   06/08/22 177 lb (80.3 kg)   03/29/21 171 lb (77.6 kg)       Appearance: Alert and oriented x3 not in acute distress. Skin: Dry skin  Head: Atraumatic  Eyes: Intact extraocular muscles   ENMT: Mucous membranes are moist  Neck: Supple  Lungs: Clear to auscultation  Cardiac: Normal S1 and S2  Abdomen: Protuberant  Extremities: Intact range of motion  Neurologic: No focal neurological deficits  Peripheral Pulses: 2+ peripheral pulses    Intake/Output:    Intake/Output Summary (Last 24 hours) at 1/30/2023 2115  Last data filed at 1/30/2023 2005  Gross per 24 hour   Intake --   Output 600 ml   Net -600 ml     I/O this shift:  In: -   Out: 600 [Urine:600]    Laboratory Tests:  Recent Labs     01/29/23  0611 01/30/23  0243   * 135   K 3.7 3.8   CL 96* 101   CO2 24 22   BUN 35* 34*   CREATININE 1.6* 1.4*   GLUCOSE 236* 217*   CALCIUM 7.4* 7.6*     Lab Results   Component Value Date/Time    MG 1.7 01/27/2023 08:05 AM    MG 1.8 01/27/2023 04:10 AM    MG 1.8 01/27/2023 12:02 AM     No results for input(s): ALKPHOS, ALT, AST, PROT, BILITOT, BILIDIR, LABALBU in the last 72 hours.   Recent Labs     01/28/23  0630 01/29/23  0611   WBC 10.0 6.9   RBC 3.89 3.74*   HGB 11.7* 11.2*   HCT 35.5* 33.5*   MCV 91.3 89.6   MCH 30.1 29.9   MCHC 33.0 33.4   RDW 13.7 13.5    156   MPV 10.1 10.2     Lab Results   Component Value Date    CKTOTAL 147 01/15/2018    CKMB 12.4 (H) 01/10/2018    TROPONINI 0.07 (H) 01/10/2018    TROPONINI 0.10 (H) 01/09/2018    TROPONINI 0.13 (H) 01/09/2018     No results for input(s): CKTOTAL, CKMB, CKMBINDEX, TROPHS in the last 72 hours. Lab Results   Component Value Date    INR 1.0 03/28/2021    INR 1.0 03/25/2021    PROTIME 10.9 03/28/2021    PROTIME 10.8 03/25/2021     Lab Results   Component Value Date    TSH 0.607 01/27/2023    TSH 0.686 01/10/2018     Lab Results   Component Value Date    LABA1C 8.3 (H) 01/10/2018     No results found for: EAG  Lab Results   Component Value Date    CHOL 155 01/10/2018     Lab Results   Component Value Date    TRIG 338 (H) 01/10/2018     Lab Results   Component Value Date    HDL 29 01/10/2018     Lab Results   Component Value Date    LDLCALC 58 01/10/2018     Lab Results   Component Value Date    LABVLDL 68 01/10/2018     No results found for: CHOLHDLRATIO  No results for input(s): PROBNP in the last 72 hours. Cardiac Tests:      TTE-1/10/2018: No significant valve disease   Normal left ventricle size and systolic function. Indeterminate diastolic function. No wall motion abnormalities. Ejection fraction is visually estimated at 65%. Mild concentric left ventricular hypertrophy. TTE- 1/17/23:  Left ventricle size is normal.   Ejection fraction is visually estimated at 50-55%. Overall ejection fraction is low normal . LV apex appears hypokinetic. Mild concentric left ventricular hypertrophy. Stage I diastolic dysfunction. Normal right ventricular size and function. TAPSE 19 mm. No hemodynamically significant aortic stenosis is present. Unable to estimate PA systolic pressure. No evidence for hemodynamically significant pericardial effusion. Assessment:  Noted no clear evidence of atrial fibrillation as of now.   Elevated troponin secondary to underlying GENIA, no evidence of ACS  Hypertension not well controlled  Diabetic ketoacidosis, improving. Type II diabetes on insulin with a peripheral neuropathy  Tobacco use disorder  Family history of premature CAD  Hyperlipidemia on statin  History of depression  History of dementia  PAD with left toe amputation  Covid infection. Plan:   Continue to monitor on telemetry   If no evidence of A. fib initiate Zio patch heart monitor in the outpatient for 14 days for further evaluation and follow-up with cardiology   Pressure is not adequately controlled: Please wean off clonidine given it rebound hypertension  Change metoprolol succinate to carvedilol for optimal blood pressure control uptitrate for optimal blood pressure control as heart rate allow  Continue hydralazine  Continue on statin therapy  Echo shows EF of 50 to 55%  Follow-up with cardiology once discharged for further evaluation and management                   Thank you for allowing me to participate in your patient's care. Please feel free to contact me if you have any questions or concerns.     Carrol Jordan MD  [address] Cardiology

## 2023-01-31 NOTE — PLAN OF CARE
Problem: Discharge Planning  Goal: Discharge to home or other facility with appropriate resources  1/31/2023 0851 by Daquan Moeller RN  Outcome: Progressing  1/30/2023 2342 by Yelitza Abdullahi RN  Outcome: Progressing     Problem: Pain  Goal: Verbalizes/displays adequate comfort level or baseline comfort level  1/31/2023 0851 by Daquan Moeller RN  Outcome: Progressing  1/30/2023 2342 by Yelitza Abdullahi RN  Outcome: Progressing     Problem: Safety - Adult  Goal: Free from fall injury  1/31/2023 0851 by Daquan Moeller RN  Outcome: Progressing  1/30/2023 2342 by Yelitza Abdullahi RN  Outcome: Progressing  Flowsheets (Taken 1/30/2023 1134 by Ar Wilson RN)  Free From Fall Injury: Instruct family/caregiver on patient safety     Problem: Skin/Tissue Integrity  Goal: Absence of new skin breakdown  Description: 1. Monitor for areas of redness and/or skin breakdown  2. Assess vascular access sites hourly  3. Every 4-6 hours minimum:  Change oxygen saturation probe site  4. Every 4-6 hours:  If on nasal continuous positive airway pressure, respiratory therapy assess nares and determine need for appliance change or resting period.   1/31/2023 0851 by Daquan Moeller RN  Outcome: Progressing  1/30/2023 2342 by Yelitza Abdullahi RN  Outcome: Progressing     Problem: Chronic Conditions and Co-morbidities  Goal: Patient's chronic conditions and co-morbidity symptoms are monitored and maintained or improved  1/31/2023 0851 by Daquan Moeller RN  Outcome: Progressing  1/30/2023 2342 by Yelitza Abdullahi RN  Outcome: Progressing     Problem: Respiratory - Adult  Goal: Achieves optimal ventilation and oxygenation  1/31/2023 0851 by Daquan Moeller RN  Outcome: Progressing  1/30/2023 2342 by Yelitza Abdullahi RN  Outcome: Progressing  Flowsheets (Taken 1/30/2023 2250)  Achieves optimal ventilation and oxygenation: Assess for changes in respiratory status     Problem: Gastrointestinal - Adult  Goal: Minimal or absence of nausea and vomiting  1/31/2023 0851 by Juliette Sr RN  Outcome: Progressing  1/30/2023 2342 by Barbi Christianson RN  Outcome: Progressing  Goal: Maintains or returns to baseline bowel function  1/31/2023 0851 by Juliette Sr RN  Outcome: Progressing  1/30/2023 2342 by Barbi Christianson RN  Outcome: Progressing  Goal: Maintains adequate nutritional intake  1/31/2023 0851 by Juliette Sr RN  Outcome: Progressing  1/30/2023 2342 by Barbi Christianson RN  Outcome: Progressing  Goal: Establish and maintain optimal ostomy function  1/31/2023 0851 by Juliette Sr RN  Outcome: Progressing  1/30/2023 2342 by Barbi Christianson RN  Outcome: Progressing

## 2023-01-31 NOTE — PROGRESS NOTES
Pt and sis in law given and explained all discharge instructions. All questions answered to satisfaction. Stressed the importance of all follow up doctor appts and medication compliance. Pt. In understanding. Pt wheeled down by aid.

## 2023-01-31 NOTE — PROGRESS NOTES
CLINICAL PHARMACY NOTE: MEDS TO BEDS    Total # of Prescriptions Filled: 10   The following medications were delivered to the patient:  One touch ultra device  Diazepam 5  Insulin needles  One touch strips  Metoprolol er 25  Humalog 100  One touch lancets  Pantoprazole 40  Lantus 100  Hydralazine 50    Additional Documentation:

## 2023-01-31 NOTE — PROGRESS NOTES
GENERAL SURGERY  DAILY PROGRESS NOTE  1/31/2023    Subjective:  No abdominal complaints overnight. Specifically, no nausea, or vomiting. Tolerating regular diet. Objective:  BP (!) 190/95   Pulse 90   Temp 99.2 °F (37.3 °C) (Oral)   Resp 18   Ht 5' 7\" (1.702 m)   Wt 170 lb 8 oz (77.3 kg)   SpO2 97%   BMI 26.70 kg/m²     GENERAL:  NAD. A&Ox3. HEAD:  Normocephalic. Atraumatic. EYES:   No scleral icterus. .  LUNGS: no acute respiratory distress. On room air  CARDIOVASCULAR: Hemodynamically stable, regular rate  ABDOMEN:  Soft, non-distended, non-tender. No guarding, rigidity, rebound. Musculoskeletal:   MAEx4. Atraumatic  SKIN:  Warm and dry  NEUROLOGIC:  No focal neurologic deficits    Assessment/Plan:  81 y.o. male admitted for treatment of DKA and COVID -19.  Resolving abdominal pain    - no surgical/endoscopic intervention planned at this time  - continue PPI BID  - follow up as outpatient as needed, info added in EMR    Electronically signed by Kellie Santana DO on 1/31/2023 at 7:36 AM

## 2023-02-01 ENCOUNTER — TELEPHONE (OUTPATIENT)
Dept: CARDIOLOGY CLINIC | Age: 73
End: 2023-02-01

## 2023-02-01 DIAGNOSIS — I48.91 ATRIAL FIBRILLATION, UNSPECIFIED TYPE (HCC): Primary | ICD-10-CM

## 2023-02-02 NOTE — TELEPHONE ENCOUNTER
Patient notified of Dr. Mart's recommendation and appt date/time. Please advise if patient needs Zio.

## 2023-02-06 ENCOUNTER — NURSE ONLY (OUTPATIENT)
Dept: CARDIOLOGY CLINIC | Age: 73
End: 2023-02-06

## 2023-02-06 NOTE — PROGRESS NOTES
Patient was seen today and a 14 day  monitor was placed. Monitor was ordered by Dr. Sophia Gonzalez . The monitor was applied, instructions were given to the patient. Patient stated understanding and gave verbalize readback.    Monitor company: [employer]   Serial number: [id number]

## 2023-03-01 DIAGNOSIS — I48.91 ATRIAL FIBRILLATION, UNSPECIFIED TYPE (HCC): ICD-10-CM

## 2023-03-03 ENCOUNTER — TELEPHONE (OUTPATIENT)
Dept: CARDIOLOGY CLINIC | Age: 73
End: 2023-03-03

## 2023-03-03 NOTE — TELEPHONE ENCOUNTER
----- Message from Vera Barrett MD sent at 3/3/2023  1:42 PM EST -----  His Holter monitor showed several short runs of SVT (fast heart rate) no life-threatening arrhythmias were noted. Continue current dose of Toprol-XL 25 mg p.o. twice daily and follow-up with me as scheduled on 3/16/2023.

## 2023-03-16 ENCOUNTER — OFFICE VISIT (OUTPATIENT)
Dept: CARDIOLOGY CLINIC | Age: 73
End: 2023-03-16
Payer: MEDICARE

## 2023-03-16 VITALS
HEIGHT: 66 IN | WEIGHT: 178.5 LBS | RESPIRATION RATE: 16 BRPM | HEART RATE: 64 BPM | DIASTOLIC BLOOD PRESSURE: 64 MMHG | BODY MASS INDEX: 28.69 KG/M2 | SYSTOLIC BLOOD PRESSURE: 132 MMHG

## 2023-03-16 DIAGNOSIS — I48.91 ATRIAL FIBRILLATION, UNSPECIFIED TYPE (HCC): Primary | ICD-10-CM

## 2023-03-16 PROCEDURE — 1123F ACP DISCUSS/DSCN MKR DOCD: CPT | Performed by: INTERNAL MEDICINE

## 2023-03-16 PROCEDURE — 3017F COLORECTAL CA SCREEN DOC REV: CPT | Performed by: INTERNAL MEDICINE

## 2023-03-16 PROCEDURE — 4004F PT TOBACCO SCREEN RCVD TLK: CPT | Performed by: INTERNAL MEDICINE

## 2023-03-16 PROCEDURE — 93000 ELECTROCARDIOGRAM COMPLETE: CPT | Performed by: INTERNAL MEDICINE

## 2023-03-16 PROCEDURE — G8484 FLU IMMUNIZE NO ADMIN: HCPCS | Performed by: INTERNAL MEDICINE

## 2023-03-16 PROCEDURE — G8427 DOCREV CUR MEDS BY ELIG CLIN: HCPCS | Performed by: INTERNAL MEDICINE

## 2023-03-16 PROCEDURE — 99214 OFFICE O/P EST MOD 30 MIN: CPT | Performed by: INTERNAL MEDICINE

## 2023-03-16 PROCEDURE — G8417 CALC BMI ABV UP PARAM F/U: HCPCS | Performed by: INTERNAL MEDICINE

## 2023-03-16 NOTE — PROGRESS NOTES
OUTPATIENT CARDIOLOGY FOLLOW-UP    Name: Kerry Gandhi    Age: 81 y.o. Primary Care Physician: Kadie Ying DO    Date of Service: 3/16/2023    Chief Complaint:   Chief Complaint   Patient presents with    Atrial Fibrillation       Interim History:   Mr. Niki Addison is a 65-year-old  male with history of occasional tobacco use, hypertension, hyperlipidemia, family history of premature CAD, history of left heart cath underwent at the [facility] approximately 1520 years ago, no intervention was performed, type 2 diabetes on insulin with peripheral neuropathy, dementia, depression, anxiety, left toe amputation, status post back surgery, history of normal LV function based on echo done in 2018 with mild LVH. Patient presented to the hospital on 1/26/2023 with nausea and vomiting. He was diagnosed with DKA and AKA,  He was also diagnosed with Covid infection. He was seen as a consult for new onset atrial fibrillation. On reviewing rhythm strips and EKG it was determined that he did not have atrial fibrillation. He was advised to get 14 day monitor as an OP to document any episodes of AF. He had an echo  which showed borderline LV function and stage 1 diastolic dysfunction. His BP was poorly controlled and was started back on clonidine. Patient was discharged home on 1/31/23, since he was discharged home, no further ER visits or hospitalizations. He told me that he quit smoking in 2/23 after he came home from the hospital.     He is compliant with medications, as well as salt and fluid intake. He does not take any over-the-counter arthritis medications. No new cardiac complaints since last cardiology evaluation. He denies recent chest pain, SOB, palpitations, lightheadedness, dizziness, syncope, PND, or orthopnea. SR on EKG.     Review of Systems:   Cardiac: As per HPI  General: No fever, chills  Pulmonary: As per HPI  HEENT: No visual disturbances, difficult swallowing  GI: No nausea, vomiting  Endocrine: No thyroid disease or DM  Musculoskeletal: HALE x 4, no focal motor deficits  Skin: Intact, no rashes  Neuro/Psych: No headache or seizures    Past Medical History:  Past Medical History:   Diagnosis Date    Dementia ([facility] [address]     Depression     Diabetes mellitus ([facility]     Hypertension     Neuropathy        Past Surgical History:  Past Surgical History:   Procedure Laterality Date    BACK SURGERY      FOOT DEBRIDEMENT Left 3/25/2021    BONE BIOPSY AND CULTURE, GREAT TOE LEFT FOOT performed by Darylene Brod, DPM at [place] [id number] Left 3/28/2021    LEFT FOOT TOE AMPUTATION,  LEFT FOOT PARTIAL HALLUX AMPUTATION WITH DEBRIDEMENT OF NON VIABLE TISSUE performed by Darylene Brod, DPM at [facility]       Family History:  No family history on file.     Social History:  Social History     Socioeconomic History    Marital status: Legally      Spouse name: Not on file    Number of children: Not on file    Years of education: Not on file    Highest education level: Not on file   Occupational History    Not on file   Tobacco Use    Smoking status: Some Days     Packs/day: 0.25     Types: Cigarettes     Passive exposure: Current    Smokeless tobacco: Never   Substance and Sexual Activity    Alcohol use: No    Drug use: No    Sexual activity: Not on file   Other Topics Concern    Not on file   Social History Narrative    Not on file     Social Determinants of Health     Financial Resource Strain: Not on file   Food Insecurity: Not on file   Transportation Needs: Not on file   Physical Activity: Not on file   Stress: Not on file   Social Connections: Not on file   Intimate Partner Violence: Not on file   Housing Stability: Not on file       Allergies:  No Known Allergies    Current Medications:  Current Outpatient Medications   Medication Sig Dispense Refill    insulin glargine (LANTUS) 100 UNIT/ML injection vial Inject 20 Units into the skin nightly 10 mL 3    hydrALAZINE (APRESOLINE) 50 MG tablet Take 1 tablet by mouth every 8 hours 90 tablet 3    metoprolol succinate (TOPROL XL) 25 MG extended release tablet Take 1 tablet by mouth 2 times daily 30 tablet 3    pantoprazole (PROTONIX) 40 MG tablet Take 1 tablet by mouth 2 times daily (before meals) 30 tablet 3    insulin lispro (HUMALOG) 100 UNIT/ML injection vial Inject 3 Units into the skin 3 times daily (before meals) 10 mL 3    Lancets MISC 1 each by Does not apply route daily 300 each 1    glucose monitoring (FREESTYLE FREEDOM) kit 1 kit by Does not apply route daily 1 kit 0    INSULIN SYRINGE 1CC/29G (AIMSCO INSULIN SYR ULTRA THIN) 29G X 1/2\" 1 ML MISC 1 each by Does not apply route daily 100 each 3    blood glucose monitor strips Test 4 times a day & as needed for symptoms of irregular blood glucose. Dispense sufficient amount for indicated testing frequency plus additional to accommodate PRN testing needs. 120 strip 1    aspirin 81 MG EC tablet Take 81 mg by mouth every morning      sertraline (ZOLOFT) 50 MG tablet Take 50 mg by mouth every morning      cloNIDine (CATAPRES) 0.2 MG tablet Take 1 tablet by mouth 2 times daily 60 tablet 3    Ergocalciferol (VITAMIN D2) 2000 units TABS Take 2,000 Units by mouth Daily with lunch       hydrOXYzine (ATARAX) 25 MG tablet Take 25 mg by mouth nightly      magnesium oxide (MAG-OX) 400 (240 Mg) MG tablet Take 1 tablet by mouth 2 times daily 60 tablet 3    rosuvastatin (CRESTOR) 40 MG tablet Take 40 mg by mouth nightly       fenofibrate (TRICOR) 145 MG tablet Take 145 mg by mouth every morning       No current facility-administered medications for this visit.        Physical Exam:  /64   Pulse 64   Resp 16   Ht 5' 6\" (1.676 m)   Wt 178 lb 8 oz (81 kg)   BMI 28.81 kg/m²   Wt Readings from Last 3 Encounters:   03/16/23 178 lb 8 oz (81 kg)   01/31/23 170 lb 8 oz (77.3 kg)   06/08/22 177 lb (80.3 kg)     Appearance: Awake, alert and oriented x 3, no acute respiratory distress  Skin: Intact, no rash  Head: Normocephalic, atraumatic  Eyes: EOMI, no conjunctival erythema  ENMT: No pharyngeal erythema, MMM, no rhinorrhea  Neck: Supple, no elevated JVP, no carotid bruits  Lungs: Clear to auscultation bilaterally. No wheezes, rales, or rhonchi.   Cardiac: Regular rate and rhythm, +S1S2, no murmurs apparent  Abdomen: Soft, nontender, +bowel sounds  Extremities: Moves all extremities x 4, no lower extremity edema  Neurologic: No focal motor deficits apparent, normal mood and affect, alert and oriented x 3  Peripheral Pulses: Intact posterior tibial pulses bilaterally    Laboratory Tests:  Lab Results   Component Value Date    CREATININE 1.3 (H) 01/31/2023    BUN 24 (H) 01/31/2023     01/31/2023    K 3.8 01/31/2023     01/31/2023    CO2 21 (L) 01/31/2023     Lab Results   Component Value Date/Time    MG 1.7 01/27/2023 08:05 AM     Lab Results   Component Value Date    WBC 6.9 01/29/2023    HGB 11.2 (L) 01/29/2023    HCT 33.5 (L) 01/29/2023    MCV 89.6 01/29/2023     01/29/2023     Lab Results   Component Value Date    ALT 20 01/26/2023    AST 41 (H) 01/26/2023    ALKPHOS 85 01/26/2023    BILITOT 0.5 01/26/2023     Lab Results   Component Value Date    CKTOTAL 147 01/15/2018    CKMB 12.4 (H) 01/10/2018    TROPONINI 0.07 (H) 01/10/2018    TROPONINI 0.10 (H) 01/09/2018    TROPONINI 0.13 (H) 01/09/2018     Lab Results   Component Value Date    INR 1.0 03/28/2021    INR 1.0 03/25/2021    PROTIME 10.9 03/28/2021    PROTIME 10.8 03/25/2021     Lab Results   Component Value Date    TSH 0.607 01/27/2023     Lab Results   Component Value Date    LABA1C 8.3 (H) 01/10/2018     No results found for: EAG  Lab Results   Component Value Date    CHOL 155 01/10/2018     Lab Results   Component Value Date    TRIG 338 (H) 01/10/2018     Lab Results   Component Value Date    HDL 29 01/10/2018     Lab Results   Component Value Date    LDLCALC 58 01/10/2018     Lab Results   Component Value Date    LABVLDL 68 01/10/2018     No results found for: CHOLHDLRATIO    Cardiac Tests:  ECG: Ectopic atrial rhythm, IVCD, T wave changes suggestive of lateral wall ischemia, abnormal EKG.      TTE-1/10/2018:   No significant valve disease   Normal left ventricle size and systolic function.   Indeterminate diastolic function.   No wall motion abnormalities.   Ejection fraction is visually estimated at 65%.   Mild concentric left ventricular hypertrophy.    Echocardiogram - 1/26/2023:   Ejection fraction is visually estimated at 50-55%.  Overall ejection fraction is low normal . LV apex appears hypokinetic.  Mild concentric left ventricular hypertrophy.  Stage I diastolic dysfunction.  Normal right ventricular size and function. TAPSE 19 mm.  No hemodynamically significant aortic stenosis is present.  Unable to estimate PA systolic pressure.  No evidence for hemodynamically significant pericardial effusion.    Stress test:        Cardiac catheterization:     14 day Holter monitor: NSR, short runs of SVT, no atrial fibrillation, no VT/VF or pauses.     The ASCVD Risk score (Alexey DK, et al., 2019) failed to calculate for the following reasons:    Cannot find a previous HDL lab    Cannot find a previous total cholesterol lab        ASSESSMENT:  Short runs of SVT, no atrial fibrillation, now with ectopic atrial rhythm.   Hypertension well controlled,current /64  H/o Diabetic ketoacidosis, resolved.   Type II diabetes on insulin with a peripheral neuropathy  Tobacco use disorder, quit in 2/23  Family history of premature  Hyperlipidemia on statin  History of depression  History of dementia  PAD with left toe amputation  H/o Covid infection in 1/2023.       Plan:   Recent Holter monitor results reviewed, no evidence of atrial fibrillation,  had a short runs of SVT.  EKG done today was reviewed has atrial rhythm no A-fib.  We will continue metoprolol succinate 25 mg p.o. twice daily for rate control due to frequent ectopy and short  runs of SVT. Continue clonidine 0.2 mg p.o. twice daily and hydralazine 50 mg p.o. every 8 hours for hypertension  Continue aspirin 81 mg p.o. p.o. daily and rosuvastatin 40 mg p.o. daily along with fenofibrate 145 mg. Continue rest of the current medications per primary service  Patient is otherwise stable from the cardiology service  Patient was strongly encouraged to quit tobacco use  Follow-up with me in 6 months. The patient's current medication list, allergies, problem list and results of all previously ordered testing were reviewed at today's visit.   Vicenta Staples MD  [facility]) Cardiology

## 2023-03-16 NOTE — PATIENT INSTRUCTIONS
Recent Holter monitor results reviewed, no evidence of atrial fibrillation had a short runs of SVT. EKG done today was reviewed has atrial rhythm no A-fib. We will continue metoprolol succinate 25 mg p.o. twice daily for rate control due to frequent ectopy and short runs of SVT. Continue clonidine 0.2 mg p.o. twice daily and hydralazine 50 mg p.o. every 8 hours for hypertension  Continue aspirin 81 mg p.o. p.o. daily and rosuvastatin 40 mg p.o. daily along with fenofibrate 145 mg. Continue rest of the current medications per primary service  Patient is otherwise stable from the cardiology service  Patient was strongly encouraged to quit tobacco use  Follow-up with me in 6 months.

## 2023-09-05 ENCOUNTER — HOSPITAL ENCOUNTER (EMERGENCY)
Age: 73
Discharge: HOME | End: 2023-09-05
Attending: EMERGENCY MEDICINE
Payer: MEDICARE

## 2023-09-05 VITALS
SYSTOLIC BLOOD PRESSURE: 100 MMHG | HEART RATE: 72 BPM | DIASTOLIC BLOOD PRESSURE: 58 MMHG | HEIGHT: 66 IN | TEMPERATURE: 97.7 F | OXYGEN SATURATION: 97 % | BODY MASS INDEX: 27 KG/M2 | WEIGHT: 168 LBS | RESPIRATION RATE: 20 BRPM

## 2023-09-05 DIAGNOSIS — H92.01 EAR PAIN, RIGHT: Primary | ICD-10-CM

## 2023-09-05 DIAGNOSIS — H93.8X1 SENSATION OF FULLNESS IN RIGHT EAR: ICD-10-CM

## 2023-09-05 PROCEDURE — 99283 EMERGENCY DEPT VISIT LOW MDM: CPT

## 2023-09-05 RX ORDER — AMOXICILLIN 500 MG/1
500 CAPSULE ORAL 2 TIMES DAILY
Qty: 14 CAPSULE | Refills: 0 | Status: SHIPPED | OUTPATIENT
Start: 2023-09-05 | End: 2023-09-12

## 2023-09-05 RX ORDER — CIPROFLOXACIN AND DEXAMETHASONE 3; 1 MG/ML; MG/ML
4 SUSPENSION/ DROPS AURICULAR (OTIC) 2 TIMES DAILY
Qty: 7.5 ML | Refills: 0 | Status: SHIPPED | OUTPATIENT
Start: 2023-09-05 | End: 2023-09-12

## 2023-09-05 RX ORDER — AMOXICILLIN AND CLAVULANATE POTASSIUM 875; 125 MG/1; MG/1
1 TABLET, FILM COATED ORAL 2 TIMES DAILY
Qty: 14 TABLET | Refills: 0 | Status: SHIPPED | OUTPATIENT
Start: 2023-09-05 | End: 2023-09-05

## 2023-09-05 ASSESSMENT — PAIN DESCRIPTION - ONSET: ONSET: ON-GOING

## 2023-09-05 ASSESSMENT — PAIN DESCRIPTION - PAIN TYPE: TYPE: ACUTE PAIN

## 2023-09-05 ASSESSMENT — PAIN SCALES - GENERAL: PAINLEVEL_OUTOF10: 10

## 2023-09-05 ASSESSMENT — PAIN DESCRIPTION - LOCATION: LOCATION: EAR

## 2023-09-05 ASSESSMENT — PAIN DESCRIPTION - ORIENTATION: ORIENTATION: RIGHT

## 2023-09-05 ASSESSMENT — PAIN - FUNCTIONAL ASSESSMENT
PAIN_FUNCTIONAL_ASSESSMENT: PREVENTS OR INTERFERES SOME ACTIVE ACTIVITIES AND ADLS
PAIN_FUNCTIONAL_ASSESSMENT: 0-10

## 2023-09-05 ASSESSMENT — PAIN DESCRIPTION - FREQUENCY: FREQUENCY: CONTINUOUS

## 2023-09-05 ASSESSMENT — PAIN DESCRIPTION - DESCRIPTORS: DESCRIPTORS: ACHING;SHARP;SHOOTING;SORE

## 2023-09-05 NOTE — ED NOTES
Irrigated Rt ear with 300 ml warm tap water. Returned several small pieces of wax. Patient c/o discomfort with irrigation.       Brittany Marsh RN  09/05/23 [id number]

## 2023-09-11 ENCOUNTER — OFFICE VISIT (OUTPATIENT)
Dept: PRIMARY CARE CLINIC | Age: 73
End: 2023-09-11
Payer: MEDICARE

## 2023-09-11 VITALS — HEART RATE: 62 BPM | TEMPERATURE: 97.8 F | DIASTOLIC BLOOD PRESSURE: 64 MMHG | SYSTOLIC BLOOD PRESSURE: 136 MMHG

## 2023-09-11 DIAGNOSIS — H61.21 RIGHT EAR IMPACTED CERUMEN: Primary | ICD-10-CM

## 2023-09-11 DIAGNOSIS — H69.81 EUSTACHIAN TUBE DYSFUNCTION, RIGHT: ICD-10-CM

## 2023-09-11 PROCEDURE — G8417 CALC BMI ABV UP PARAM F/U: HCPCS | Performed by: NURSE PRACTITIONER

## 2023-09-11 PROCEDURE — 1123F ACP DISCUSS/DSCN MKR DOCD: CPT | Performed by: NURSE PRACTITIONER

## 2023-09-11 PROCEDURE — 69210 REMOVE IMPACTED EAR WAX UNI: CPT | Performed by: NURSE PRACTITIONER

## 2023-09-11 PROCEDURE — 99213 OFFICE O/P EST LOW 20 MIN: CPT | Performed by: NURSE PRACTITIONER

## 2023-09-11 PROCEDURE — 4004F PT TOBACCO SCREEN RCVD TLK: CPT | Performed by: NURSE PRACTITIONER

## 2023-09-11 PROCEDURE — 3017F COLORECTAL CA SCREEN DOC REV: CPT | Performed by: NURSE PRACTITIONER

## 2023-09-11 PROCEDURE — G8427 DOCREV CUR MEDS BY ELIG CLIN: HCPCS | Performed by: NURSE PRACTITIONER

## 2023-09-11 RX ORDER — CETIRIZINE HCL 1 MG/ML
10 SOLUTION, ORAL ORAL DAILY
Qty: 30 TABLET | Refills: 0 | Status: SHIPPED | COMMUNITY
Start: 2023-09-11

## 2023-09-11 RX ORDER — AZELASTINE 1 MG/ML
1 SPRAY, METERED NASAL 2 TIMES DAILY
Qty: 30 ML | Refills: 0 | Status: SHIPPED | OUTPATIENT
Start: 2023-09-11

## 2023-09-16 ENCOUNTER — HOSPITAL ENCOUNTER (EMERGENCY)
Age: 73
Discharge: HOME | End: 2023-09-16
Attending: STUDENT IN AN ORGANIZED HEALTH CARE EDUCATION/TRAINING PROGRAM
Payer: MEDICARE

## 2023-09-16 ENCOUNTER — APPOINTMENT (OUTPATIENT)
Dept: CT IMAGING | Age: 73
End: 2023-09-16
Payer: MEDICARE

## 2023-09-16 VITALS
BODY MASS INDEX: 27 KG/M2 | HEART RATE: 76 BPM | TEMPERATURE: 98.3 F | WEIGHT: 168 LBS | SYSTOLIC BLOOD PRESSURE: 124 MMHG | RESPIRATION RATE: 16 BRPM | DIASTOLIC BLOOD PRESSURE: 72 MMHG | HEIGHT: 66 IN | OXYGEN SATURATION: 99 %

## 2023-09-16 DIAGNOSIS — H92.01 RIGHT EAR PAIN: Primary | ICD-10-CM

## 2023-09-16 DIAGNOSIS — R79.89 ELEVATED SERUM CREATININE: ICD-10-CM

## 2023-09-16 LAB
ANION GAP SERPL CALCULATED.3IONS-SCNC: 8 MMOL/L (ref 7–16)
ANION GAP SERPL CALCULATED.3IONS-SCNC: 8 MMOL/L (ref 7–16)
BASOPHILS # BLD: 0.04 K/UL (ref 0–0.2)
BASOPHILS NFR BLD: 1 % (ref 0–2)
BUN SERPL-MCNC: 25 MG/DL (ref 6–23)
BUN SERPL-MCNC: 26 MG/DL (ref 6–23)
CALCIUM SERPL-MCNC: 8.5 MG/DL (ref 8.6–10.2)
CALCIUM SERPL-MCNC: 8.5 MG/DL (ref 8.6–10.2)
CHLORIDE SERPL-SCNC: 102 MMOL/L (ref 98–107)
CHLORIDE SERPL-SCNC: 104 MMOL/L (ref 98–107)
CO2 SERPL-SCNC: 24 MMOL/L (ref 22–29)
CO2 SERPL-SCNC: 25 MMOL/L (ref 22–29)
CREAT SERPL-MCNC: 1.9 MG/DL (ref 0.7–1.2)
CREAT SERPL-MCNC: 1.9 MG/DL (ref 0.7–1.2)
EOSINOPHIL # BLD: 0.37 K/UL (ref 0.05–0.5)
EOSINOPHILS RELATIVE PERCENT: 6 % (ref 0–6)
ERYTHROCYTE [DISTWIDTH] IN BLOOD BY AUTOMATED COUNT: 15.6 % (ref 11.5–15)
GFR SERPL CREATININE-BSD FRML MDRD: 37 ML/MIN/1.73M2
GFR SERPL CREATININE-BSD FRML MDRD: 37 ML/MIN/1.73M2
GLUCOSE SERPL-MCNC: 120 MG/DL (ref 74–99)
GLUCOSE SERPL-MCNC: 99 MG/DL (ref 74–99)
HCT VFR BLD AUTO: 29.7 % (ref 37–54)
HGB BLD-MCNC: 9.9 G/DL (ref 12.5–16.5)
IMM GRANULOCYTES # BLD AUTO: 0.08 K/UL (ref 0–0.58)
IMM GRANULOCYTES NFR BLD: 1 % (ref 0–5)
LYMPHOCYTES NFR BLD: 1.16 K/UL (ref 1.5–4)
LYMPHOCYTES RELATIVE PERCENT: 19 % (ref 20–42)
MCH RBC QN AUTO: 28.2 PG (ref 26–35)
MCHC RBC AUTO-ENTMCNC: 33.3 G/DL (ref 32–34.5)
MCV RBC AUTO: 84.6 FL (ref 80–99.9)
MONOCYTES NFR BLD: 0.54 K/UL (ref 0.1–0.95)
MONOCYTES NFR BLD: 9 % (ref 2–12)
NEUTROPHILS NFR BLD: 64 % (ref 43–80)
NEUTS SEG NFR BLD: 3.89 K/UL (ref 1.8–7.3)
PLATELET # BLD AUTO: 223 K/UL (ref 130–450)
PMV BLD AUTO: 9.3 FL (ref 7–12)
POTASSIUM SERPL-SCNC: 4 MMOL/L (ref 3.5–5)
POTASSIUM SERPL-SCNC: 4 MMOL/L (ref 3.5–5)
RBC # BLD AUTO: 3.51 M/UL (ref 3.8–5.8)
SODIUM SERPL-SCNC: 134 MMOL/L (ref 132–146)
SODIUM SERPL-SCNC: 137 MMOL/L (ref 132–146)
WBC OTHER # BLD: 6.1 K/UL (ref 4.5–11.5)

## 2023-09-16 PROCEDURE — 70480 CT ORBIT/EAR/FOSSA W/O DYE: CPT

## 2023-09-16 PROCEDURE — 99284 EMERGENCY DEPT VISIT MOD MDM: CPT

## 2023-09-16 PROCEDURE — 80048 BASIC METABOLIC PNL TOTAL CA: CPT

## 2023-09-16 PROCEDURE — 85025 COMPLETE CBC W/AUTO DIFF WBC: CPT

## 2023-09-16 PROCEDURE — 2580000003 HC RX 258: Performed by: STUDENT IN AN ORGANIZED HEALTH CARE EDUCATION/TRAINING PROGRAM

## 2023-09-16 RX ORDER — CIPROFLOXACIN AND DEXAMETHASONE 3; 1 MG/ML; MG/ML
4 SUSPENSION/ DROPS AURICULAR (OTIC) 2 TIMES DAILY
Qty: 1 EACH | Refills: 0 | Status: SHIPPED | OUTPATIENT
Start: 2023-09-16 | End: 2023-09-26

## 2023-09-16 RX ORDER — AMOXICILLIN AND CLAVULANATE POTASSIUM 875; 125 MG/1; MG/1
1 TABLET, FILM COATED ORAL 2 TIMES DAILY
Qty: 14 TABLET | Refills: 0 | Status: SHIPPED | OUTPATIENT
Start: 2023-09-16 | End: 2023-09-23

## 2023-09-16 RX ORDER — 0.9 % SODIUM CHLORIDE 0.9 %
1000 INTRAVENOUS SOLUTION INTRAVENOUS ONCE
Status: COMPLETED | OUTPATIENT
Start: 2023-09-16 | End: 2023-09-16

## 2023-09-16 RX ADMIN — SODIUM CHLORIDE 1000 ML: 9 INJECTION, SOLUTION INTRAVENOUS at 13:27

## 2023-09-16 ASSESSMENT — ENCOUNTER SYMPTOMS
ABDOMINAL PAIN: 0
EYE PAIN: 0
DIARRHEA: 0
NAUSEA: 0
EYE DISCHARGE: 0
SINUS PRESSURE: 0
WHEEZING: 0
SORE THROAT: 0
VOMITING: 0
BACK PAIN: 0
SHORTNESS OF BREATH: 0
COUGH: 0
EYE REDNESS: 0

## 2023-09-16 ASSESSMENT — PAIN - FUNCTIONAL ASSESSMENT: PAIN_FUNCTIONAL_ASSESSMENT: NONE - DENIES PAIN

## 2023-09-16 NOTE — ED NOTES
Right ear irrigated. Moderate amount removed. States still with difficulty hearing from ear. Physician aware.       Seema Sutton RN  09/16/23 [id number]

## 2023-09-16 NOTE — DISCHARGE INSTRUCTIONS
CT IAC POSTERIOR FOSSA WO CONTRAST   Final Result   Limited evaluation of the soft tissue without IV contrast.      Thickening of the right tympanic membrane, with mild debris in the right   middle ear cavity, likely related to sequelae of chronic inflammatory changes   and prior otitis media. Moderate right mastoid effusion, without erosive changes of the bony septa. The soft tissue surrounding the bilateral mastoid is clear without soft   tissue swelling, fluid collection or abscess.            Take p.o. antibiotics as prescribed  Follow-up with ENT  If symptoms worsen concern arises return for reevaluation

## 2023-09-16 NOTE — ED NOTES
Discharge instructions and prescriptions given. Patient states verbal understanding. Ambulatory to exit.        Greyson Perez RN  09/16/23 2024

## 2023-09-16 NOTE — ED PROVIDER NOTES
Department of Emergency Medicine   ED  Provider Note  Admit Date/RoomTime: 9/16/2023 10:00 AM  ED Room: 06/06              [facility]     Frank Dc is a 81 y.o. male with a PMHx significant for DM who presents for evaluation of right ear fullness and discomfort, beginning at arrival.  The complaint has been persistent, moderate in severity, and worsened by nothing. The patient states that he has been having some ear issues, fullness for about 1 month now. Notes discomfort to the right ear, states he was seen 2 weeks ago started on eardrops and oral antibiotics. Took the full course a week, still having discomfort, was seen at a walk-in clinic had a irrigated and then started on nasal spray. Notes that he is still continued to have fullness and discomfort and a hard time. Outside of the ear. Denies any fevers, chills, chest pain, shortness of breath, nausea, vomiting, diarrhea. .     Review of Systems   Constitutional:  Negative for chills and fever. HENT:  Positive for ear pain. Negative for sinus pressure and sore throat. Right-sided ear pain and fullness   Eyes:  Negative for pain, discharge and redness. Respiratory:  Negative for cough, shortness of breath and wheezing. Cardiovascular:  Negative for chest pain. Gastrointestinal:  Negative for abdominal pain, diarrhea, nausea and vomiting. Genitourinary:  Negative for dysuria and frequency. Musculoskeletal:  Negative for arthralgias and back pain. Skin:  Negative for rash and wound. Neurological:  Negative for weakness and headaches. Hematological:  Negative for adenopathy. All other systems reviewed and are negative. Physical Exam  Vitals and nursing note reviewed. Constitutional:       General: He is not in acute distress. Appearance: Normal appearance. He is well-developed. He is not ill-appearing. HENT:      Head: Normocephalic and atraumatic.       Right Ear: External ear normal.      Left Ear: Tympanic

## 2023-09-25 ENCOUNTER — TELEPHONE (OUTPATIENT)
Dept: ENT CLINIC | Age: 73
End: 2023-09-25

## 2023-09-25 NOTE — TELEPHONE ENCOUNTER
Patient called in states that he was seen at [facility] for fb in right ear. States they removed it but is not able to hear very well out of the right ear.   Offered patient an appt tomorrow on 9/26/23 with Santa Go  patient declined appt and states that he is going back to the walk in care in [place]

## 2024-09-30 ENCOUNTER — APPOINTMENT (OUTPATIENT)
Dept: GENERAL RADIOLOGY | Age: 74
DRG: 683 | End: 2024-09-30
Payer: MEDICARE

## 2024-09-30 ENCOUNTER — APPOINTMENT (OUTPATIENT)
Dept: ULTRASOUND IMAGING | Age: 74
DRG: 683 | End: 2024-09-30
Payer: MEDICARE

## 2024-09-30 ENCOUNTER — HOSPITAL ENCOUNTER (INPATIENT)
Age: 74
LOS: 14 days | Discharge: HOME HEALTH | DRG: 683 | End: 2024-10-14
Attending: STUDENT IN AN ORGANIZED HEALTH CARE EDUCATION/TRAINING PROGRAM | Admitting: GENERAL PRACTICE
Payer: MEDICARE

## 2024-09-30 DIAGNOSIS — E83.42 HYPOMAGNESEMIA: ICD-10-CM

## 2024-09-30 DIAGNOSIS — N17.9 ACUTE RENAL FAILURE SUPERIMPOSED ON CHRONIC KIDNEY DISEASE, UNSPECIFIED ACUTE RENAL FAILURE TYPE, UNSPECIFIED CKD STAGE (HCC): ICD-10-CM

## 2024-09-30 DIAGNOSIS — H60.501 ACUTE OTITIS EXTERNA OF RIGHT EAR, UNSPECIFIED TYPE: ICD-10-CM

## 2024-09-30 DIAGNOSIS — M79.662 PAIN IN LEFT LOWER LEG: ICD-10-CM

## 2024-09-30 DIAGNOSIS — N18.9 ACUTE RENAL FAILURE SUPERIMPOSED ON CHRONIC KIDNEY DISEASE, UNSPECIFIED ACUTE RENAL FAILURE TYPE, UNSPECIFIED CKD STAGE (HCC): ICD-10-CM

## 2024-09-30 DIAGNOSIS — I50.9 NEW ONSET OF CONGESTIVE HEART FAILURE (HCC): Primary | ICD-10-CM

## 2024-09-30 DIAGNOSIS — I10 HYPERTENSION, UNSPECIFIED TYPE: ICD-10-CM

## 2024-09-30 LAB
ANION GAP SERPL CALCULATED.3IONS-SCNC: 12 MMOL/L (ref 7–16)
BASOPHILS # BLD: 0.05 K/UL (ref 0–0.2)
BASOPHILS NFR BLD: 1 % (ref 0–2)
BNP SERPL-MCNC: ABNORMAL PG/ML (ref 0–125)
BUN SERPL-MCNC: 36 MG/DL (ref 6–23)
CALCIUM SERPL-MCNC: 7.4 MG/DL (ref 8.6–10.2)
CHLORIDE SERPL-SCNC: 110 MMOL/L (ref 98–107)
CO2 SERPL-SCNC: 19 MMOL/L (ref 22–29)
CREAT SERPL-MCNC: 3.4 MG/DL (ref 0.7–1.2)
D-DIMER QUANTITATIVE: 906 NG/ML DDU (ref 0–230)
EOSINOPHIL # BLD: 0.15 K/UL (ref 0.05–0.5)
EOSINOPHILS RELATIVE PERCENT: 2 % (ref 0–6)
ERYTHROCYTE [DISTWIDTH] IN BLOOD BY AUTOMATED COUNT: 15.9 % (ref 11.5–15)
GFR, ESTIMATED: 19 ML/MIN/1.73M2
GLUCOSE BLD-MCNC: 260 MG/DL (ref 74–99)
GLUCOSE SERPL-MCNC: 126 MG/DL (ref 74–99)
HCT VFR BLD AUTO: 31.9 % (ref 37–54)
HGB BLD-MCNC: 10.2 G/DL (ref 12.5–16.5)
IMM GRANULOCYTES # BLD AUTO: 0.04 K/UL (ref 0–0.58)
IMM GRANULOCYTES NFR BLD: 1 % (ref 0–5)
LYMPHOCYTES NFR BLD: 1.08 K/UL (ref 1.5–4)
LYMPHOCYTES RELATIVE PERCENT: 13 % (ref 20–42)
MAGNESIUM SERPL-MCNC: 1.3 MG/DL (ref 1.6–2.6)
MCH RBC QN AUTO: 29.3 PG (ref 26–35)
MCHC RBC AUTO-ENTMCNC: 32 G/DL (ref 32–34.5)
MCV RBC AUTO: 91.7 FL (ref 80–99.9)
MONOCYTES NFR BLD: 0.54 K/UL (ref 0.1–0.95)
MONOCYTES NFR BLD: 7 % (ref 2–12)
NEUTROPHILS NFR BLD: 77 % (ref 43–80)
NEUTS SEG NFR BLD: 6.36 K/UL (ref 1.8–7.3)
PLATELET # BLD AUTO: 221 K/UL (ref 130–450)
PMV BLD AUTO: 10.7 FL (ref 7–12)
POTASSIUM SERPL-SCNC: 3.8 MMOL/L (ref 3.5–5)
RBC # BLD AUTO: 3.48 M/UL (ref 3.8–5.8)
SODIUM SERPL-SCNC: 141 MMOL/L (ref 132–146)
TROPONIN I SERPL HS-MCNC: 167 NG/L (ref 0–11)
TROPONIN I SERPL HS-MCNC: 170 NG/L (ref 0–11)
TSH SERPL DL<=0.05 MIU/L-ACNC: 0.92 UIU/ML (ref 0.27–4.2)
WBC OTHER # BLD: 8.2 K/UL (ref 4.5–11.5)

## 2024-09-30 PROCEDURE — 83880 ASSAY OF NATRIURETIC PEPTIDE: CPT

## 2024-09-30 PROCEDURE — 2060000000 HC ICU INTERMEDIATE R&B

## 2024-09-30 PROCEDURE — 71046 X-RAY EXAM CHEST 2 VIEWS: CPT

## 2024-09-30 PROCEDURE — 93005 ELECTROCARDIOGRAM TRACING: CPT | Performed by: STUDENT IN AN ORGANIZED HEALTH CARE EDUCATION/TRAINING PROGRAM

## 2024-09-30 PROCEDURE — 93970 EXTREMITY STUDY: CPT

## 2024-09-30 PROCEDURE — 96365 THER/PROPH/DIAG IV INF INIT: CPT

## 2024-09-30 PROCEDURE — 80048 BASIC METABOLIC PNL TOTAL CA: CPT

## 2024-09-30 PROCEDURE — 82962 GLUCOSE BLOOD TEST: CPT

## 2024-09-30 PROCEDURE — 85379 FIBRIN DEGRADATION QUANT: CPT

## 2024-09-30 PROCEDURE — 85025 COMPLETE CBC W/AUTO DIFF WBC: CPT

## 2024-09-30 PROCEDURE — 84443 ASSAY THYROID STIM HORMONE: CPT

## 2024-09-30 PROCEDURE — 6370000000 HC RX 637 (ALT 250 FOR IP): Performed by: GENERAL PRACTICE

## 2024-09-30 PROCEDURE — 99285 EMERGENCY DEPT VISIT HI MDM: CPT

## 2024-09-30 PROCEDURE — 84484 ASSAY OF TROPONIN QUANT: CPT

## 2024-09-30 PROCEDURE — 83735 ASSAY OF MAGNESIUM: CPT

## 2024-09-30 PROCEDURE — 6360000002 HC RX W HCPCS

## 2024-09-30 PROCEDURE — 6370000000 HC RX 637 (ALT 250 FOR IP)

## 2024-09-30 RX ORDER — CHOLECALCIFEROL (VITAMIN D3) 50 MCG
2000 TABLET ORAL DAILY
Status: DISCONTINUED | OUTPATIENT
Start: 2024-10-01 | End: 2024-10-14 | Stop reason: HOSPADM

## 2024-09-30 RX ORDER — DIAZEPAM 5 MG/1
5 TABLET ORAL 2 TIMES DAILY
COMMUNITY

## 2024-09-30 RX ORDER — HYDROXYZINE PAMOATE 25 MG/1
25 CAPSULE ORAL NIGHTLY
Status: DISCONTINUED | OUTPATIENT
Start: 2024-09-30 | End: 2024-10-14 | Stop reason: HOSPADM

## 2024-09-30 RX ORDER — MAGNESIUM SULFATE IN WATER 40 MG/ML
2000 INJECTION, SOLUTION INTRAVENOUS ONCE
Status: COMPLETED | OUTPATIENT
Start: 2024-09-30 | End: 2024-09-30

## 2024-09-30 RX ORDER — CIPROFLOXACIN AND DEXAMETHASONE 3; 1 MG/ML; MG/ML
4 SUSPENSION/ DROPS AURICULAR (OTIC) ONCE
Status: COMPLETED | OUTPATIENT
Start: 2024-09-30 | End: 2024-09-30

## 2024-09-30 RX ORDER — LANOLIN ALCOHOL/MO/W.PET/CERES
400 CREAM (GRAM) TOPICAL 2 TIMES DAILY
Status: DISCONTINUED | OUTPATIENT
Start: 2024-09-30 | End: 2024-10-14 | Stop reason: HOSPADM

## 2024-09-30 RX ORDER — DEXTROSE MONOHYDRATE 100 MG/ML
INJECTION, SOLUTION INTRAVENOUS CONTINUOUS PRN
Status: DISCONTINUED | OUTPATIENT
Start: 2024-09-30 | End: 2024-10-14 | Stop reason: HOSPADM

## 2024-09-30 RX ORDER — INSULIN GLARGINE 100 [IU]/ML
30 INJECTION, SOLUTION SUBCUTANEOUS NIGHTLY
Status: DISCONTINUED | OUTPATIENT
Start: 2024-09-30 | End: 2024-10-14 | Stop reason: HOSPADM

## 2024-09-30 RX ORDER — INSULIN GLARGINE 100 [IU]/ML
30 INJECTION, SOLUTION SUBCUTANEOUS NIGHTLY
COMMUNITY
Start: 2024-07-05

## 2024-09-30 RX ORDER — ASPIRIN 81 MG/1
81 TABLET ORAL EVERY MORNING
Status: DISCONTINUED | OUTPATIENT
Start: 2024-10-01 | End: 2024-10-14 | Stop reason: HOSPADM

## 2024-09-30 RX ORDER — FUROSEMIDE 10 MG/ML
20 INJECTION INTRAMUSCULAR; INTRAVENOUS ONCE
Status: DISCONTINUED | OUTPATIENT
Start: 2024-09-30 | End: 2024-09-30

## 2024-09-30 RX ORDER — HYDRALAZINE HYDROCHLORIDE 50 MG/1
50 TABLET, FILM COATED ORAL EVERY 8 HOURS SCHEDULED
Status: DISCONTINUED | OUTPATIENT
Start: 2024-09-30 | End: 2024-10-14 | Stop reason: HOSPADM

## 2024-09-30 RX ORDER — ROSUVASTATIN CALCIUM 20 MG/1
40 TABLET, COATED ORAL NIGHTLY
Status: DISCONTINUED | OUTPATIENT
Start: 2024-09-30 | End: 2024-10-14 | Stop reason: HOSPADM

## 2024-09-30 RX ORDER — METOPROLOL SUCCINATE 25 MG/1
25 TABLET, EXTENDED RELEASE ORAL 2 TIMES DAILY
Status: DISCONTINUED | OUTPATIENT
Start: 2024-09-30 | End: 2024-10-14 | Stop reason: HOSPADM

## 2024-09-30 RX ORDER — CLONIDINE HYDROCHLORIDE 0.1 MG/1
0.2 TABLET ORAL ONCE
Status: COMPLETED | OUTPATIENT
Start: 2024-09-30 | End: 2024-09-30

## 2024-09-30 RX ORDER — HYDRALAZINE HYDROCHLORIDE 50 MG/1
50 TABLET, FILM COATED ORAL ONCE
Status: COMPLETED | OUTPATIENT
Start: 2024-09-30 | End: 2024-09-30

## 2024-09-30 RX ORDER — INSULIN LISPRO 100 [IU]/ML
3 INJECTION, SOLUTION INTRAVENOUS; SUBCUTANEOUS
COMMUNITY
Start: 2024-09-14

## 2024-09-30 RX ORDER — TOPIRAMATE 200 MG/1
TABLET, FILM COATED ORAL
COMMUNITY
Start: 2024-09-23 | End: 2024-09-30 | Stop reason: ALTCHOICE

## 2024-09-30 RX ORDER — GLUCAGON 1 MG/ML
1 KIT INJECTION PRN
Status: DISCONTINUED | OUTPATIENT
Start: 2024-09-30 | End: 2024-10-14 | Stop reason: HOSPADM

## 2024-09-30 RX ORDER — DIAZEPAM 5 MG/1
5 TABLET ORAL 2 TIMES DAILY
Status: DISCONTINUED | OUTPATIENT
Start: 2024-09-30 | End: 2024-10-14 | Stop reason: HOSPADM

## 2024-09-30 RX ORDER — FENOFIBRATE 160 MG/1
160 TABLET ORAL DAILY
Status: DISCONTINUED | OUTPATIENT
Start: 2024-10-01 | End: 2024-10-14 | Stop reason: HOSPADM

## 2024-09-30 RX ORDER — INSULIN LISPRO 100 [IU]/ML
0-4 INJECTION, SOLUTION INTRAVENOUS; SUBCUTANEOUS
Status: DISCONTINUED | OUTPATIENT
Start: 2024-10-01 | End: 2024-10-14 | Stop reason: HOSPADM

## 2024-09-30 RX ORDER — INSULIN LISPRO 100 [IU]/ML
0-4 INJECTION, SOLUTION INTRAVENOUS; SUBCUTANEOUS NIGHTLY
Status: DISCONTINUED | OUTPATIENT
Start: 2024-09-30 | End: 2024-10-14 | Stop reason: HOSPADM

## 2024-09-30 RX ORDER — CLONIDINE HYDROCHLORIDE 0.2 MG/1
0.2 TABLET ORAL 2 TIMES DAILY
Status: DISCONTINUED | OUTPATIENT
Start: 2024-09-30 | End: 2024-10-05

## 2024-09-30 RX ORDER — PANTOPRAZOLE SODIUM 40 MG/1
40 TABLET, DELAYED RELEASE ORAL
Status: DISCONTINUED | OUTPATIENT
Start: 2024-10-01 | End: 2024-10-14 | Stop reason: HOSPADM

## 2024-09-30 RX ADMIN — HYDROXYZINE PAMOATE 25 MG: 25 CAPSULE ORAL at 21:22

## 2024-09-30 RX ADMIN — DIAZEPAM 5 MG: 5 TABLET ORAL at 21:22

## 2024-09-30 RX ADMIN — HYDRALAZINE HYDROCHLORIDE 50 MG: 50 TABLET ORAL at 21:22

## 2024-09-30 RX ADMIN — HYDRALAZINE HYDROCHLORIDE 50 MG: 50 TABLET ORAL at 19:22

## 2024-09-30 RX ADMIN — INSULIN GLARGINE 30 UNITS: 100 INJECTION, SOLUTION SUBCUTANEOUS at 21:26

## 2024-09-30 RX ADMIN — CIPROFLOXACIN AND DEXAMETHASONE 4 DROP: 3; 1 SUSPENSION/ DROPS AURICULAR (OTIC) at 18:00

## 2024-09-30 RX ADMIN — METOPROLOL SUCCINATE 25 MG: 25 TABLET, EXTENDED RELEASE ORAL at 21:23

## 2024-09-30 RX ADMIN — ROSUVASTATIN CALCIUM 40 MG: 20 TABLET, FILM COATED ORAL at 21:22

## 2024-09-30 RX ADMIN — CLONIDINE HYDROCHLORIDE 0.2 MG: 0.1 TABLET ORAL at 19:22

## 2024-09-30 RX ADMIN — CLONIDINE HYDROCHLORIDE 0.2 MG: 0.2 TABLET ORAL at 21:22

## 2024-09-30 RX ADMIN — MAGNESIUM OXIDE 400 MG (241.3 MG MAGNESIUM) TABLET 400 MG: TABLET at 21:22

## 2024-09-30 RX ADMIN — MAGNESIUM SULFATE HEPTAHYDRATE 2000 MG: 40 INJECTION, SOLUTION INTRAVENOUS at 17:33

## 2024-09-30 ASSESSMENT — PAIN - FUNCTIONAL ASSESSMENT: PAIN_FUNCTIONAL_ASSESSMENT: NONE - DENIES PAIN

## 2024-09-30 NOTE — PROGRESS NOTES
Database initiated pharmacy and medications verified with the patient. He is A&O comes in from home alone. He uses a cane sometimes and is RA at baseline. His BLE are weak and he has fallen recently.

## 2024-09-30 NOTE — ED PROVIDER NOTES
[facility] MED SURG  EMERGENCY DEPARTMENT ENCOUNTER        Pt Name: Santosh Thomas  MRN: [id number]  Birthdate 1950  Date of evaluation: 9/30/2024  Provider: Ash Cruz MD  Attending Provider: Devaughn Reed DO  PCP: Devaughn Reed DO  Note Started: 2:05 PM EDT 9/30/24    CHIEF COMPLAINT       Chief Complaint   Patient presents with    Hypertension       HISTORY OF PRESENT ILLNESS: 1 or more Elements   History From: the patient        Santosh Thomas is a 73 y.o. male with a past medical history of diabetes, hypertension, dementia presenting with high blood pressure.  Patient states that he checks his blood pressure at home and it was elevated.  He  also reports that he has had increased weight gain.  He states that he has gained approximately 11 pounds over the last week.  He has noted increasing swelling and discomfort in his legs which she has never had in the past.  He denies any known history of heart disease or heart failure.  He saw his primary care physician who referred him to get outpatient imaging.  While getting a chest x-ray outpatient, patient had a possible syncopal event or near syncopal event where EMS was called.  Patient was brought to the emergency room for further evaluation.  Patient denies any dizziness or chest pain or shortness of breath currently.  He is more concerned about his blood pressure and swelling in his legs at this time    Nursing Notes were all reviewed and agreed with or any disagreements were addressed in the HPI.      REVIEW OF EXTERNAL NOTE :           PDMP Monitoring:    Last PDMP William as Reviewed:  Review User Review Instant Review Result            Urine Drug Screenings (1 yr)       Urine Drug Screen  Collected: 1/9/2018  1:45 PM (Final result)              Serum Drug Screen  Collected: 1/9/2018 11:00 AM (Final result)              Cannabinoid, Urine  Collected: 1/9/2018 11:00 AM (Final result)                  Medication Contract and Consent for Opioid  tablet Take 1 tablet by mouth every morning      cloNIDine (CATAPRES) 0.2 MG tablet Take 1 tablet by mouth 2 times daily  Qty: 60 tablet, Refills: 3      hydrOXYzine (ATARAX) 25 MG tablet Take 1 tablet by mouth nightly      magnesium oxide (MAG-OX) 400 (240 Mg) MG tablet Take 1 tablet by mouth 2 times daily  Qty: 60 tablet, Refills: 3      rosuvastatin (CRESTOR) 40 MG tablet Take 1 tablet by mouth nightly      fenofibrate (TRICOR) 145 MG tablet Take 1 tablet by mouth every morning      Lancets MISC 1 each by Does not apply route daily  Qty: 300 each, Refills: 1      glucose monitoring (FREESTYLE FREEDOM) kit 1 kit by Does not apply route daily  Qty: 1 kit, Refills: 0      INSULIN SYRINGE 1CC/29G (AIMSCO INSULIN SYR ULTRA THIN) 29G X 1/2\" 1 ML MISC 1 each by Does not apply route daily  Qty: 100 each, Refills: 3      blood glucose monitor strips Test 4 times a day & as needed for symptoms of irregular blood glucose. Dispense sufficient amount for indicated testing frequency plus additional to accommodate PRN testing needs.  Qty: 120 strip, Refills: 1    Comments: Brand per patient preference. May round up to next available package size.             ALLERGIES     Patient has no known allergies.    FAMILYHISTORY     History reviewed. No pertinent family history.     SOCIAL HISTORY       Social History     Tobacco Use    Smoking status: Some Days     Current packs/day: 0.25     Types: Cigarettes     Passive exposure: Current    Smokeless tobacco: Never   Vaping Use    Vaping status: Never Used   Substance Use Topics    Alcohol use: No    Drug use: Yes     Comment: gummies       SCREENINGS        Leighton Coma Scale  Eye Opening: Spontaneous  Best Verbal Response: Oriented  Best Motor Response: Obeys commands  Leighton Coma Scale Score: 15                CIWA Assessment  BP: 120/72  Pulse: 64           PHYSICAL EXAM  1 or more Elements     ED Triage Vitals   BP Systolic BP Percentile Diastolic BP Percentile Temp Temp src

## 2024-10-01 ENCOUNTER — APPOINTMENT (OUTPATIENT)
Age: 74
DRG: 683 | End: 2024-10-01
Attending: GENERAL PRACTICE
Payer: MEDICARE

## 2024-10-01 ENCOUNTER — APPOINTMENT (OUTPATIENT)
Dept: ULTRASOUND IMAGING | Age: 74
DRG: 683 | End: 2024-10-01
Payer: MEDICARE

## 2024-10-01 LAB
ALBUMIN SERPL-MCNC: 2.4 G/DL (ref 3.5–5.2)
ALP SERPL-CCNC: 84 U/L (ref 40–129)
ALT SERPL-CCNC: 16 U/L (ref 0–40)
ANION GAP SERPL CALCULATED.3IONS-SCNC: 6 MMOL/L (ref 7–16)
AST SERPL-CCNC: 17 U/L (ref 0–39)
BACTERIA URNS QL MICRO: ABNORMAL
BILIRUB SERPL-MCNC: <0.2 MG/DL (ref 0–1.2)
BILIRUB UR QL STRIP: NEGATIVE
BUN SERPL-MCNC: 36 MG/DL (ref 6–23)
CALCIUM SERPL-MCNC: 7.2 MG/DL (ref 8.6–10.2)
CASTS #/AREA URNS LPF: ABNORMAL /LPF
CASTS #/AREA URNS LPF: ABNORMAL /LPF
CHLORIDE SERPL-SCNC: 112 MMOL/L (ref 98–107)
CHOLEST SERPL-MCNC: 159 MG/DL
CLARITY UR: CLEAR
CO2 SERPL-SCNC: 22 MMOL/L (ref 22–29)
COLOR UR: YELLOW
CREAT SERPL-MCNC: 3.6 MG/DL (ref 0.7–1.2)
CREAT UR-MCNC: 114.1 MG/DL (ref 40–278)
CREAT UR-MCNC: 121 MG/DL (ref 40–278)
CREAT UR-MCNC: 123.9 MG/DL (ref 40–278)
ECHO AO ASC DIAM: 4.1 CM
ECHO AO ASCENDING AORTA INDEX: 2.1 CM/M2
ECHO AV AREA PEAK VELOCITY: 2.6 CM2
ECHO AV AREA VTI: 2.7 CM2
ECHO AV AREA/BSA PEAK VELOCITY: 1.3 CM2/M2
ECHO AV AREA/BSA VTI: 1.4 CM2/M2
ECHO AV CUSP MM: 2.4 CM
ECHO AV MEAN GRADIENT: 3 MMHG
ECHO AV MEAN VELOCITY: 0.8 M/S
ECHO AV PEAK GRADIENT: 8 MMHG
ECHO AV PEAK VELOCITY: 1.4 M/S
ECHO AV VELOCITY RATIO: 0.79
ECHO AV VTI: 24.5 CM
ECHO BSA: 1.99 M2
ECHO EST RA PRESSURE: 3 MMHG
ECHO LA DIAMETER INDEX: 2.15 CM/M2
ECHO LA DIAMETER: 4.2 CM
ECHO LA VOL A-L A2C: 96 ML (ref 18–58)
ECHO LA VOL A-L A4C: 87 ML (ref 18–58)
ECHO LA VOL MOD A2C: 90 ML (ref 18–58)
ECHO LA VOL MOD A4C: 82 ML (ref 18–58)
ECHO LA VOLUME AREA LENGTH: 92 ML
ECHO LA VOLUME INDEX A-L A2C: 49 ML/M2 (ref 16–34)
ECHO LA VOLUME INDEX A-L A4C: 45 ML/M2 (ref 16–34)
ECHO LA VOLUME INDEX AREA LENGTH: 47 ML/M2 (ref 16–34)
ECHO LA VOLUME INDEX MOD A2C: 46 ML/M2 (ref 16–34)
ECHO LA VOLUME INDEX MOD A4C: 42 ML/M2 (ref 16–34)
ECHO LV EDV A2C: 97 ML
ECHO LV EDV A4C: 113 ML
ECHO LV EDV BP: 105 ML (ref 67–155)
ECHO LV EDV INDEX A4C: 58 ML/M2
ECHO LV EDV INDEX BP: 54 ML/M2
ECHO LV EDV NDEX A2C: 50 ML/M2
ECHO LV EF PHYSICIAN: 40 %
ECHO LV EJECTION FRACTION A2C: 31 %
ECHO LV EJECTION FRACTION A4C: 37 %
ECHO LV EJECTION FRACTION BIPLANE: 34 % (ref 55–100)
ECHO LV ESV A2C: 67 ML
ECHO LV ESV A4C: 72 ML
ECHO LV ESV BP: 70 ML (ref 22–58)
ECHO LV ESV INDEX A2C: 34 ML/M2
ECHO LV ESV INDEX A4C: 37 ML/M2
ECHO LV ESV INDEX BP: 36 ML/M2
ECHO LV FRACTIONAL SHORTENING: 20 % (ref 28–44)
ECHO LV INTERNAL DIMENSION DIASTOLE INDEX: 2.51 CM/M2
ECHO LV INTERNAL DIMENSION DIASTOLIC: 4.9 CM (ref 4.2–5.9)
ECHO LV INTERNAL DIMENSION SYSTOLIC INDEX: 2 CM/M2
ECHO LV INTERNAL DIMENSION SYSTOLIC: 3.9 CM
ECHO LV ISOVOLUMETRIC RELAXATION TIME (IVRT): 90 MS
ECHO LV IVSD: 1.7 CM (ref 0.6–1)
ECHO LV IVSS: 1.7 CM
ECHO LV MASS 2D: 297.5 G (ref 88–224)
ECHO LV MASS INDEX 2D: 152.6 G/M2 (ref 49–115)
ECHO LV POSTERIOR WALL DIASTOLIC: 1.2 CM (ref 0.6–1)
ECHO LV POSTERIOR WALL SYSTOLIC: 1.7 CM
ECHO LV RELATIVE WALL THICKNESS RATIO: 0.49
ECHO LVOT AREA: 3.5 CM2
ECHO LVOT AV VTI INDEX: 0.8
ECHO LVOT DIAM: 2.1 CM
ECHO LVOT MEAN GRADIENT: 2 MMHG
ECHO LVOT PEAK GRADIENT: 4 MMHG
ECHO LVOT PEAK VELOCITY: 1.1 M/S
ECHO LVOT STROKE VOLUME INDEX: 34.6 ML/M2
ECHO LVOT SV: 67.5 ML
ECHO LVOT VTI: 19.5 CM
ECHO MV "A" WAVE DURATION: 86.5 MSEC
ECHO MV A VELOCITY: 0.3 M/S
ECHO MV AREA PHT: 3.5 CM2
ECHO MV AREA VTI: 2.6 CM2
ECHO MV E DECELERATION TIME (DT): 183.6 MS
ECHO MV E VELOCITY: 0.99 M/S
ECHO MV E/A RATIO: 3.3
ECHO MV LVOT VTI INDEX: 1.32
ECHO MV MAX VELOCITY: 1 M/S
ECHO MV MEAN GRADIENT: 2 MMHG
ECHO MV MEAN VELOCITY: 0.6 M/S
ECHO MV PEAK GRADIENT: 4 MMHG
ECHO MV PRESSURE HALF TIME (PHT): 62.8 MS
ECHO MV REGURGITANT PEAK GRADIENT: 85 MMHG
ECHO MV REGURGITANT PEAK VELOCITY: 4.6 M/S
ECHO MV VTI: 25.8 CM
ECHO PV MAX VELOCITY: 0.7 M/S
ECHO PV MEAN GRADIENT: 1 MMHG
ECHO PV MEAN VELOCITY: 0.5 M/S
ECHO PV PEAK GRADIENT: 2 MMHG
ECHO PV VTI: 15.3 CM
EKG ATRIAL RATE: 82 BPM
EKG P AXIS: 29 DEGREES
EKG P-R INTERVAL: 166 MS
EKG Q-T INTERVAL: 414 MS
EKG QRS DURATION: 126 MS
EKG QTC CALCULATION (BAZETT): 483 MS
EKG R AXIS: -82 DEGREES
EKG T AXIS: 76 DEGREES
EKG VENTRICULAR RATE: 82 BPM
GFR, ESTIMATED: 17 ML/MIN/1.73M2
GLUCOSE BLD-MCNC: 153 MG/DL (ref 74–99)
GLUCOSE BLD-MCNC: 172 MG/DL (ref 74–99)
GLUCOSE BLD-MCNC: 179 MG/DL (ref 74–99)
GLUCOSE BLD-MCNC: 191 MG/DL (ref 74–99)
GLUCOSE SERPL-MCNC: 187 MG/DL (ref 74–99)
GLUCOSE UR STRIP-MCNC: >=1000 MG/DL
HBA1C MFR BLD: 6.6 % (ref 4–5.6)
HDLC SERPL-MCNC: 41 MG/DL
HGB UR QL STRIP.AUTO: NEGATIVE
KETONES UR STRIP-MCNC: NEGATIVE MG/DL
LDLC SERPL CALC-MCNC: 95 MG/DL
LEUKOCYTE ESTERASE UR QL STRIP: NEGATIVE
MICROALBUMIN UR-MCNC: >4400 MG/L (ref 0–19)
MICROALBUMIN/CREAT UR-RTO: ABNORMAL MCG/MG CREAT (ref 0–30)
NITRITE UR QL STRIP: NEGATIVE
PH UR STRIP: 6 [PH] (ref 5–9)
POTASSIUM SERPL-SCNC: 3.9 MMOL/L (ref 3.5–5)
PROT SERPL-MCNC: 4.3 G/DL (ref 6.4–8.3)
PROT UR STRIP-MCNC: >=300 MG/DL
RBC #/AREA URNS HPF: ABNORMAL /HPF
SODIUM SERPL-SCNC: 140 MMOL/L (ref 132–146)
SODIUM UR-SCNC: 63 MMOL/L
SP GR UR STRIP: 1.02 (ref 1–1.03)
TOTAL PROTEIN, URINE: >600 MG/DL (ref 0–12)
TRIGL SERPL-MCNC: 117 MG/DL
TSH SERPL DL<=0.05 MIU/L-ACNC: 1 UIU/ML (ref 0.27–4.2)
URATE SERPL-MCNC: 6.9 MG/DL (ref 3.4–7)
URINE TOTAL PROTEIN CREATININE RATIO: ABNORMAL (ref 0–0.2)
UROBILINOGEN UR STRIP-ACNC: 0.2 EU/DL (ref 0–1)
UUN UR-MCNC: 461 MG/DL (ref 800–1666)
VLDLC SERPL CALC-MCNC: 23 MG/DL
WBC #/AREA URNS HPF: ABNORMAL /HPF

## 2024-10-01 PROCEDURE — 6370000000 HC RX 637 (ALT 250 FOR IP): Performed by: INTERNAL MEDICINE

## 2024-10-01 PROCEDURE — 84156 ASSAY OF PROTEIN URINE: CPT

## 2024-10-01 PROCEDURE — 84300 ASSAY OF URINE SODIUM: CPT

## 2024-10-01 PROCEDURE — 76770 US EXAM ABDO BACK WALL COMP: CPT

## 2024-10-01 PROCEDURE — 84540 ASSAY OF URINE/UREA-N: CPT

## 2024-10-01 PROCEDURE — 83036 HEMOGLOBIN GLYCOSYLATED A1C: CPT

## 2024-10-01 PROCEDURE — 84550 ASSAY OF BLOOD/URIC ACID: CPT

## 2024-10-01 PROCEDURE — 2060000000 HC ICU INTERMEDIATE R&B

## 2024-10-01 PROCEDURE — 81001 URINALYSIS AUTO W/SCOPE: CPT

## 2024-10-01 PROCEDURE — 6360000002 HC RX W HCPCS: Performed by: STUDENT IN AN ORGANIZED HEALTH CARE EDUCATION/TRAINING PROGRAM

## 2024-10-01 PROCEDURE — 36415 COLL VENOUS BLD VENIPUNCTURE: CPT

## 2024-10-01 PROCEDURE — 6370000000 HC RX 637 (ALT 250 FOR IP): Performed by: GENERAL PRACTICE

## 2024-10-01 PROCEDURE — 82962 GLUCOSE BLOOD TEST: CPT

## 2024-10-01 PROCEDURE — 80053 COMPREHEN METABOLIC PANEL: CPT

## 2024-10-01 PROCEDURE — 93010 ELECTROCARDIOGRAM REPORT: CPT | Performed by: INTERNAL MEDICINE

## 2024-10-01 PROCEDURE — 97165 OT EVAL LOW COMPLEX 30 MIN: CPT

## 2024-10-01 PROCEDURE — 97161 PT EVAL LOW COMPLEX 20 MIN: CPT

## 2024-10-01 PROCEDURE — 97530 THERAPEUTIC ACTIVITIES: CPT

## 2024-10-01 PROCEDURE — 93306 TTE W/DOPPLER COMPLETE: CPT

## 2024-10-01 PROCEDURE — 80061 LIPID PANEL: CPT

## 2024-10-01 PROCEDURE — 82043 UR ALBUMIN QUANTITATIVE: CPT

## 2024-10-01 PROCEDURE — 82570 ASSAY OF URINE CREATININE: CPT

## 2024-10-01 PROCEDURE — 84443 ASSAY THYROID STIM HORMONE: CPT

## 2024-10-01 PROCEDURE — 84166 PROTEIN E-PHORESIS/URINE/CSF: CPT

## 2024-10-01 RX ORDER — ACETAMINOPHEN 325 MG/1
650 TABLET ORAL EVERY 4 HOURS PRN
Status: DISCONTINUED | OUTPATIENT
Start: 2024-10-01 | End: 2024-10-14 | Stop reason: HOSPADM

## 2024-10-01 RX ORDER — BUMETANIDE 0.25 MG/ML
2 INJECTION, SOLUTION INTRAMUSCULAR; INTRAVENOUS DAILY
Status: DISCONTINUED | OUTPATIENT
Start: 2024-10-01 | End: 2024-10-03

## 2024-10-01 RX ADMIN — ROSUVASTATIN CALCIUM 40 MG: 20 TABLET, FILM COATED ORAL at 21:02

## 2024-10-01 RX ADMIN — SERTRALINE HYDROCHLORIDE 50 MG: 50 TABLET ORAL at 11:13

## 2024-10-01 RX ADMIN — DIAZEPAM 5 MG: 5 TABLET ORAL at 11:12

## 2024-10-01 RX ADMIN — MAGNESIUM OXIDE 400 MG (241.3 MG MAGNESIUM) TABLET 400 MG: TABLET at 21:01

## 2024-10-01 RX ADMIN — ACETAMINOPHEN 650 MG: 325 TABLET ORAL at 17:21

## 2024-10-01 RX ADMIN — BUMETANIDE 2 MG: 0.25 INJECTION INTRAMUSCULAR; INTRAVENOUS at 12:54

## 2024-10-01 RX ADMIN — PANTOPRAZOLE SODIUM 40 MG: 40 TABLET, DELAYED RELEASE ORAL at 05:31

## 2024-10-01 RX ADMIN — Medication 2000 UNITS: at 11:13

## 2024-10-01 RX ADMIN — METOPROLOL SUCCINATE 25 MG: 25 TABLET, EXTENDED RELEASE ORAL at 11:12

## 2024-10-01 RX ADMIN — INSULIN GLARGINE 30 UNITS: 100 INJECTION, SOLUTION SUBCUTANEOUS at 21:02

## 2024-10-01 RX ADMIN — DIAZEPAM 5 MG: 5 TABLET ORAL at 21:02

## 2024-10-01 RX ADMIN — HYDROXYZINE PAMOATE 25 MG: 25 CAPSULE ORAL at 21:02

## 2024-10-01 RX ADMIN — NITROGLYCERIN 1 INCH: 20 OINTMENT TOPICAL at 16:16

## 2024-10-01 RX ADMIN — HYDRALAZINE HYDROCHLORIDE 50 MG: 50 TABLET ORAL at 16:16

## 2024-10-01 RX ADMIN — METOPROLOL SUCCINATE 25 MG: 25 TABLET, EXTENDED RELEASE ORAL at 21:01

## 2024-10-01 RX ADMIN — PANTOPRAZOLE SODIUM 40 MG: 40 TABLET, DELAYED RELEASE ORAL at 16:16

## 2024-10-01 RX ADMIN — NITROGLYCERIN 1 INCH: 20 OINTMENT TOPICAL at 22:09

## 2024-10-01 RX ADMIN — CLONIDINE HYDROCHLORIDE 0.2 MG: 0.2 TABLET ORAL at 21:01

## 2024-10-01 RX ADMIN — ASPIRIN 81 MG: 81 TABLET, COATED ORAL at 11:13

## 2024-10-01 RX ADMIN — CLONIDINE HYDROCHLORIDE 0.2 MG: 0.2 TABLET ORAL at 11:11

## 2024-10-01 RX ADMIN — MAGNESIUM OXIDE 400 MG (241.3 MG MAGNESIUM) TABLET 400 MG: TABLET at 11:13

## 2024-10-01 RX ADMIN — HYDRALAZINE HYDROCHLORIDE 50 MG: 50 TABLET ORAL at 05:31

## 2024-10-01 RX ADMIN — HYDRALAZINE HYDROCHLORIDE 50 MG: 50 TABLET ORAL at 22:10

## 2024-10-01 ASSESSMENT — PAIN SCALES - GENERAL
PAINLEVEL_OUTOF10: 0
PAINLEVEL_OUTOF10: 5

## 2024-10-01 ASSESSMENT — PAIN DESCRIPTION - ORIENTATION: ORIENTATION: MID

## 2024-10-01 ASSESSMENT — PAIN DESCRIPTION - LOCATION: LOCATION: HEAD

## 2024-10-01 NOTE — PROGRESS NOTES
4 Eyes Skin Assessment     NAME:  Santosh Thomas  YOB: 1950  MEDICAL RECORD NUMBER:  [id number]    The patient is being assessed for  Admission    I agree that at least one RN has performed a thorough Head to Toe Skin Assessment on the patient. ALL assessment sites listed below have been assessed.      Areas assessed by both nurses:    Head, Face, Ears, Shoulders, Back, Chest, Arms, Elbows, Hands, Sacrum. Buttock, Coccyx, Ischium, Legs. Feet and Heels, and Under Medical Devices         Does the Patient have a Wound? No noted wound(s)       Diogenes Prevention initiated by RN: No  Wound Care Orders initiated by RN: No    Pressure Injury (Stage 3,4, Unstageable, DTI, NWPT, and Complex wounds) if present, place Wound referral order by RN under : No    New Ostomies, if present place, Ostomy referral order under : No     Nurse 1 eSignature: Electronically signed by Pedro Jung RN on 10/1/24 at 5:18 AM EDT    **SHARE this note so that the co-signing nurse can place an eSignature**    Nurse 2 eSignature: Electronically signed by Shiloh Ramirez RN on 10/1/24 at 5:19 AM EDT

## 2024-10-01 NOTE — ED NOTES
ED to Inpatient Handoff Report    Notified Ashley that electronic handoff available and patient ready for transport to room 610.    Safety Risks: Risk of falls    Patient in Restraints: no    Constant Observer or Patient : no    Telemetry Monitoring Ordered :Yes           Order to transfer to unit without monitor:YES    Last MEWS: 1 Time completed: 2005    Deterioration Index Score:   Predictive Model Details          25 (Normal)  Factor Value    Calculated 9/30/2024 20:07 47% Age 73 years old    Deterioration Index Model 28% Systolic 191     10% Respiratory rate 18     5% Pulse oximetry 100 %     4% BUN abnormal (36 mg/dL)     2% Sodium 141 mmol/L     2% Hematocrit abnormal (31.9 %)     1% Pulse 86     1% Potassium 3.8 mmol/L     1% WBC count 8.2 k/uL     0% Temperature 98.4 °F (36.9 °C)        Vitals:    09/30/24 1756 09/30/24 1909 09/30/24 1943 09/30/24 2005   BP:  (!) 217/100 (!) 202/96 (!) 191/96   Pulse: 78 82  86   Resp:  17  18   Temp:  98.4 °F (36.9 °C)  98.4 °F (36.9 °C)   TempSrc:  Oral  Oral   SpO2:  100%  100%   Weight:       Height:             Opportunity for questions and clarification was provided.

## 2024-10-01 NOTE — CONSULTS
CARDIOLOGY CONSULTATION    Patient Name:  Santosh Thomas    :  1950    Reason for Consultation:   Shortness of breath and lower extremity swelling.    History of Present Illness:   Santosh Thomas presents to [facility] following a several day history of increasing edema of his lower extremities and profound fatigue and rare episodes of chest discomfort.  He has no previous history of myocardial infarction.  He denies any palpitations nor lightheadedness presently.  He just feels extremely weak.    Past Medical History:   has a past medical history of Dementia ([facility]), Depression, Diabetes mellitus ([facility]), Hypertension, and Neuropathy.    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     Social History:   reports that he has been smoking cigarettes. He has been exposed to tobacco smoke. He has never used smokeless tobacco. He reports current drug use. He reports that he does not drink alcohol.     Family History:  family history is not on file.     Medications:  Prior to Admission medications    Medication Sig Start Date End Date Taking? Authorizing Provider   Cholecalciferol 50 MCG ( UT) TABS Take by mouth 24  Yes ProviderRonda MD   diazePAM (VALIUM) 5 MG tablet Take 1 tablet by mouth 2 times daily.   Yes ProviderRonda MD   LANTUS SOLOSTAR 100 UNIT/ML injection pen 30 Units nightly 24  Yes ProviderRonda MD   insulin lispro, 1 Unit Dial, (HUMALOG/ADMELOG) 100 UNIT/ML SOPN 3 Units 3 times daily (with meals) Plus sliding scale 24  Yes ProviderRonda MD   aspirin-acetaminophen-caffeine (EXCEDRIN MIGRAINE) 250-250-65 MG per tablet Take 1 tablet by mouth every 6 hours as needed for Headaches   Yes ProviderRonda MD   azelastine (ASTELIN) 0.1 % nasal spray 1 spray by Nasal route 2 times daily Use in each nostril as directed 23  Yes  Thus will empirically add nitrates as he thinks he is experienced some intermittent chest discomfort in the past.  Will ask nephrology to control his diuretics and for now we will withhold any neprilysin inhibitor or other cardiac medication that might further decrease his renal function.  Continue to closely monitor heart rhythm as well as electrolytes.    I have spent more than 55 minutes face to face with Santosh Thomas,and reviewing notes and laboratory data with greater than 50% of this time instructing and counseling the patient regarding my findings and recommendations and I have answered all questions as posed to me by Mr. Thomas. Thank you, Devaughn Reed DO for allowing me to consult in the care of this patient.    [place],  , FACP, FACC, [facility]    NOTE:  This report was transcribed using voice recognition software.  Every effort was made to ensure accuracy; however, inadvertent computerized transcription errors may be present.

## 2024-10-01 NOTE — PROGRESS NOTES
Occupational Therapy  OCCUPATIONAL THERAPY INITIAL EVALUATION  [facility]  [address], [place]    Date: 10/1/2024     Patient Name: Santosh Thomas  MRN: [id number]  : 1950  Room: [address] [place] [address]    Evaluating OT: Dee Dee Gonzalez, OTR/L - [id number]    Referring Provider: Devaughn Reed DO  Specific Provider Orders/Date: \"OT eval and treat\" - 10/1/2024    Diagnosis: Hypomagnesemia [E83.42]  New onset of congestive heart failure (HCC) [I50.9]  Acute renal failure superimposed on chronic kidney disease, unspecified acute renal failure type, unspecified CKD stage (HCC) [N17.9, N18.9]      Pertinent Medical History: dementia, depression, DM, HTN, neuropathy     Precautions: fall risk    Assessment of Current Deficits:    [x] Functional mobility   [x] ADLs  [x] Strength               [x] Cognition   [x] Functional transfers   [x] IADLs         [x] Safety Awareness   [x] Endurance   [] Fine Motor Coordination  [x] Balance      [] Vision/Perception   [x] Sensation    [] Gross Motor Coordination [] ROM          [] Delirium                  [] Motor Control     OT PLAN OF CARE   OT POC is based on physician orders, patient diagnosis, and results of clinical assessment.  Frequency/Duration 2-5 days/week for 2-4 weeks PRN   Specific OT Treatment Interventions to Include:   * Instruction/training on adapted ADL techniques and AE recommendations to increase functional independence within precautions       * Training on energy conservation strategies, correct breathing pattern and techniques to improve independence/tolerance for self-care routine  * Functional transfer/mobility training/DME recommendations for increased independence, safety, and fall prevention  * Patient/Family education to increase follow through with safety techniques and functional independence  * Recommendation of environmental modifications for increased safety with functional  Independent - with use of AE, as needed/appropriate   Bathing Mod A  Mod I / Independent - with use of AE/DME, as needed/appropriate   Toileting Min A  Mod I / Independent   Bed Mobility  Supine-to-Sit: SBA     Independent in order to maximize patient's independence/participation with ADLs, re-positioning, and other functional tasks.   Functional Transfers Sit-to-Stand: SBA   from EOB  Independent   Functional Mobility SBA (with walker) within patient's room and hallway.  Mod I / Independent with functional mobility (with device, as needed/appropriate) in order to maximize independence with ADLs/IADLs and other functional tasks.   Balance Sitting: Good (at EOB)  Standing: Fair to Fair+ (with walker)  Fair+ to Good- dynamic standing balance during completion of ADLs/IADLs and other functional tasks.   Activity Tolerance Fair+  Patient will demonstrate Good understanding and consistent implementation of energy conservation techniques and work simplification techniques into ADL/IADL routines.   Visual/  Perceptual WFL     N/A   B UE Strength, ROM B UE ROM: WFL  B UE Strength: 4/5 grossly  Patient will demonstrate 5/5 B UE strength in order to maximize independence with ADLs/IADLs and functional transfers.     Additional Long-Term Goal: Patient will increase functional independence to PLOF in order to allow patient to live in least restrictive environment.     Hearing: WFL grossly  Sensation: No c/o numbness/tingling in B UEs.  Tone: WFL  Edema: No    Comments: RN approved patient's participation in OOB activities. Upon arrival, patient supine in bed. At end of session, patient walking in hallway with PT present and all lines and tubes intact. Patient would benefit from continued skilled OT to increase safety and independence with completion of ADL/IADL tasks for functional independence and quality of life.     Patient education provided regardin) techniques to maximize safety with management/use of walker. Patient

## 2024-10-01 NOTE — CARE COORDINATION
Introduced my self and provided explanation of CM role to patient. Admitted for new onset CHF. Nephrology and Cardiology are following  ECHO results pending. Currently on IV bumex. He voices he resides alone in a 1st floor appt with steps down to apt. He completes his adl's with independence, he uses a cane to ambulate and has ww available. Patient is established with Dr. Reed and uses [facility]. Patient expressed concern of taking his medications properly. He stated he uses a weekly pill case and admittedly drops pill into wrong slot and would inquired about a nurse coming once or twice a month to ensure medication compliance, CM offered private pay aide list, patient wanted other options. CM called [facility] and they do pill packaging. CM advised patient to call Pharmacy and request pill packaging, he is agreeable and grateful. Discharge plan pending PT/OT shakila. Will continue to follow.  Brooke KUMARIN, RN  Case Management

## 2024-10-01 NOTE — PROGRESS NOTES
Physical Therapy  Facility/Department: [address] MED SURG  Physical Therapy Initial Assessment    Name: Santosh Thomas  : 1950  MRN: [id number]  Date of Service: 10/1/2024                Patient Diagnosis(es): The primary encounter diagnosis was New onset of congestive heart failure (HCC). Diagnoses of Acute renal failure superimposed on chronic kidney disease, unspecified acute renal failure type, unspecified CKD stage (HCC), Hypomagnesemia, Acute otitis externa of right ear, unspecified type, and Hypertension, unspecified type were also pertinent to this visit.  Past Medical History:  has a past medical history of Dementia (HCC), Depression, Diabetes mellitus (HCC), Hypertension, and Neuropathy.  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         Requires PT Follow-Up: Yes    Evaluating Therapist: Emily Currie, PT     Rec ww     Referring Provider:  Devaughn Reed DO     PT order : PT eval and treat     Room #: 610  DIAGNOSIS: The primary encounter diagnosis was New onset of congestive heart failure (HCC). Diagnoses of Acute renal failure superimposed on chronic kidney disease, unspecified acute renal failure type, unspecified CKD stage (HCC), Hypomagnesemia, Acute otitis externa of right ear, unspecified type, and Hypertension, unspecified type were also pertinent to this visit.  PRECAUTIONS: falls     Social:  Pt lives alone  in a  1  floor plan  apartment  steps and  1  rails to enter.  Prior to admission pt walked with  cane      Initial Evaluation  Date:  10/1/2024  Treatment      Short Term/ Long Term   Goals   Was pt agreeable to Eval/treatment?  Yes      Does pt have pain?  Discomfort from LE edema      Bed Mobility  Rolling:  NT   Supine to sit: min assist   Sit to supine:  min assist   Scooting:  SBA in sit   Independent    Transfers Sit to stand:  SBA   Stand to sit:  SBA   Stand  pivot:  NT    Independent    Ambulation     200  feet with  ww  with  SBA    200  feet with  ww  with  independent        Stair negotiation: ascended and descended NT    4  steps with  1  rail with  S/SBA    LE ROM  AAROM WFL      LE strength  3-/ 5 hips, 4-/ 5 distally      AM- PAC RAW score  17/ 24            Pt is alert and Oriented      Balance: SBA with gait, no LOB . Fall risk due to [x]Decreased strength, [x] Decreased balance, [] Decreased safety awareness, [] Decreased activity tolerance.  [] Other:     Edema: B LEs  Endurance:  WFl   Bed/Chair alarm:  yes      ASSESSMENT  Pt displays functional ability as noted in the objective portion of this evaluation.        Conditions Requiring Skilled Therapeutic Intervention:    [x]Decreased strength     []Decreased ROM  [x]Decreased functional mobility  [x]Decreased balance   [x]Decreased endurance   []Decreased posture  []Decreased sensation  []Decreased coordination   []Decreased vision  []Decreased safety awareness   [x]Increased pain         Comments:   Pt  in bed  upon arrival ; agreeable to PT. Mobility as above. Pt reports SOB with gait . Instructed in PLB. SpO2 97%, HR 73     Treatment:  Pt was instructed on the following :   -Bed mobility : including sequencing and technique. Pt needed assist with LE   -Transfers: hand placement, controlled movement with stand to sit  - Gait: proper use of ww. Rec use of ww at this time for safety and energy conservation            Pt educated on fall risk, safety with mobility        Patient response to education:   Pt verbalized understanding Pt demonstrated skill Pt requires further education in this area   x  With cues   x       Comments:  Pt left  in bed per pt request after session, with call light in reach.      Rehab potential is Good for reaching above PT goals.    Pt’s/ family goals   1.  Decreased edema     Patient and or family understand(s) diagnosis, prognosis, and plan of care. -  yes     PLAN  PT care will

## 2024-10-01 NOTE — PLAN OF CARE
Problem: ABCDS Injury Assessment  Goal: Absence of physical injury  Outcome: Progressing     Problem: Discharge Planning  Goal: Discharge to home or other facility with appropriate resources  Outcome: Progressing     Problem: Chronic Conditions and Co-morbidities  Goal: Patient's chronic conditions and co-morbidity symptoms are monitored and maintained or improved  Outcome: Progressing

## 2024-10-01 NOTE — H&P
[place] [address]                           HISTORY & PHYSICAL      PATIENT NAME: ILA RADFORD             : 1950  MED REC NO: [id number]                        ROOM: 10  ACCOUNT NO: [id number]                       ADMIT DATE: 2024  PROVIDER: Devaughn Reed DO      HISTORY OF PRESENT ILLNESS:  This is a 73-year-old white male who presented to the office yesterday with a history of chief complaint of shortness of breath, increased swelling in lower extremities, and about a 15-pound weight gain.  He states that this all came on after having a couple of cortisone shots in his knees, otherwise, he was doing okay.  He went to an Urgent Care with the symptoms and they told him that his blood pressure was elevated and really did not do too much more for him at that time.  He came in to see me.  His pressure systolic was about 190, diastolic was 100.  We are going to place him on some diuretics, but felt that it was prudent to get a chest x-ray first and set him up for a chest x-ray and I was called and said he had cardiomegaly and some increased perihilar vasculature consistent with some degree of congestive heart failure.  Blood pressure was elevated from the imaging center he was sent over to the emergency department for admission for acute congestive heart failure and hypertension.  On admission, his blood pressure was 217/100.  He was saturating at 100%.  His lung sounds were diminished in both bases.  He did have an S3 gallop.  He did have 3+ pitting edema.    PAST MEDICAL HISTORY:  Significant for diabetic ketoacidosis; rhabdomyolysis; diabetic foot infection; osteomyelitis of the left toe; chronic kidney disease; hypertension; depression; diabetes mellitus, on insulin; hyperlipidemia.    CURRENT MEDICATIONS:  Include aspirin 81 daily, vitamin D 2000 units daily, fenofibrate 160 daily, Zoloft 50 daily,

## 2024-10-01 NOTE — ACP (ADVANCE CARE PLANNING)
Advance Care Planning   Healthcare Decision Maker:    Primary Decision Maker: Lou Tejada CALL FIRST - Brother/Sister - [phone]    Secondary Decision Maker: Latonia Thomas - Renae - [phone]    Click here to complete Healthcare Decision Makers including selection of the Healthcare Decision Maker Relationship (ie \"Primary\").  Today we documented Decision Maker(s) consistent with Legal Next of Kin hierarchy.

## 2024-10-01 NOTE — CONSULTS
[facility]  Nephrology Consult Note  Patient's Name: Santosh Thomas  8:49 AM  10/1/2024    Nephrologist: seen twice inpatient by [facility] in 2018 with no subsequent outpatient follow up    Reason for Consult:  GENIA  Requesting Physician:  Devaughn Reed DO    Chief Complaint:  weakness    History Obtained From:  patient, chart    History of Present Illness:    Santosh Thomas is a 73 y.o. male with past medical history of longstanding type 2 diabetes now on insulin therapy, hypertension, diabetic complications including neuropathy and what sounds to be retinopathy as he follows Dr. Vazquez at [facility] and has required injections in the past, toe amputation of the left foot.  He also has history of hypertriglyceridemia on fenofibrate presently.  He also has some history of chronic kidney disease, likely stage IIIb based off recent labs that are available to review in the chart.  He has had multiple acute kidney injuries, our practice is seen him on 2 occurrences in 2018, once was felt to be related to rhabdomyolysis.  He did have recovery with IV fluids.  He had also had a history of NSAID use at that time.  He did not have any proteinuria at that time.  He was seen on a separate occasion in 2018 when serum creatinine peaked at 4.0 mg/dL on presentation though quickly improved to a baseline of 1.2 mg/dL.  We have not seen him since.  He has had a baseline serum creatinine of what appears to be 1.3 up to around 2.0 mg/dL over the past 1-1/2 years.  He has not seen nephrology.  Of concern, a urinalysis in January 2023 which showed a significant increase of proteinuria.    Patient presented to the hospital yesterday after being advised by his primary care physician to present.  The patient gives a history of having increased leg swelling, worsening of exertional dyspnea, weakness, fatigue that has worsened rather drastically over the past couple of weeks.  The patient relays a story of having gained about  results found for: \"FOLATE\"  IRON:  No results found for: \"IRON\"  Iron Saturation:  No components found for: \"PERCENTFE\"  TIBC:  No results found for: \"TIBC\"  FERRITIN:  No results found for: \"FERRITIN\"         Assessment/Plan:    1-GENIA on CKD Stage 3b vs CKD4  -Baseline creatinine appears 1.5-1.9 mg/dL with GFR in mid 30s as of 9/2023  -first UA January 2023 to show significant proteinuria  -Cr now 3.4 mg/dL -->3.6 mg/dL this AM  -UPCR and UACR both to significant/heavy burden of proteinuria to even quantify on spot collection, will obtain a 24-hour collection  -Urinalysis did not show any microscopic hematuria with 0-2 WBCs and RBCs per high-power field making less likely the spectrum of nephritic syndromes as cause of his acute renal failure  -His edema, history of hyperlipidemia, proteinuria and low albumin all suggestive of a potential underlying nephrotic syndrome, the most likely cause would be diabetic nephropathy given his other diabetic comorbidities and duration of having diabetes, though will pursue extensive serologic workup including hepatitis and HIV panels, KAYLYNN 2R antibody, SPEP and UPEP to check for monoclonal gammopathy's on the multiple myeloma spectrum, given component of CHF, need to rule out amyloid  -No acute indications for renal replacement therapy at this time, though depending on the results of the workup, may need to pursue kidney biopsy though if all serologies are negative, may lean in any direction of just empirically diagnosing him as diabetic nephropathy though the rapid onset of edema would be concerning, he has used extensive NSAIDs in the recent past and this may have some possibility of underlying minimal-change disease  -Avoid IV fluids, given the edema, start 2 mg of IV Bumex daily  -Ultrasound reviewed, no structural abnormalities or hydronephrosis noted  -Monitor renal function    2-BP/volume status  -Patient presented with what appeared to be hypertensive urgency, blood

## 2024-10-02 LAB
25(OH)D3 SERPL-MCNC: 13.3 NG/ML (ref 30–100)
ALBUMIN SERPL-MCNC: 2.6 G/DL (ref 3.5–5.2)
ALP SERPL-CCNC: 83 U/L (ref 40–129)
ALT SERPL-CCNC: 15 U/L (ref 0–40)
ANION GAP SERPL CALCULATED.3IONS-SCNC: 11 MMOL/L (ref 7–16)
AST SERPL-CCNC: 15 U/L (ref 0–39)
BASOPHILS # BLD: 0.04 K/UL (ref 0–0.2)
BASOPHILS NFR BLD: 1 % (ref 0–2)
BILIRUB SERPL-MCNC: 0.2 MG/DL (ref 0–1.2)
BUN SERPL-MCNC: 41 MG/DL (ref 6–23)
C3 SERPL-MCNC: 114 MG/DL (ref 90–180)
C4 SERPL-MCNC: 15 MG/DL (ref 10–40)
CALCIUM SERPL-MCNC: 7.7 MG/DL (ref 8.6–10.2)
CHLORIDE SERPL-SCNC: 109 MMOL/L (ref 98–107)
CO2 SERPL-SCNC: 18 MMOL/L (ref 22–29)
CREAT SERPL-MCNC: 4 MG/DL (ref 0.7–1.2)
EOSINOPHIL # BLD: 0.16 K/UL (ref 0.05–0.5)
EOSINOPHILS RELATIVE PERCENT: 2 % (ref 0–6)
ERYTHROCYTE [DISTWIDTH] IN BLOOD BY AUTOMATED COUNT: 16.2 % (ref 11.5–15)
GFR, ESTIMATED: 15 ML/MIN/1.73M2
GLUCOSE BLD-MCNC: 110 MG/DL (ref 74–99)
GLUCOSE BLD-MCNC: 122 MG/DL (ref 74–99)
GLUCOSE BLD-MCNC: 142 MG/DL (ref 74–99)
GLUCOSE BLD-MCNC: 196 MG/DL (ref 74–99)
GLUCOSE BLD-MCNC: 59 MG/DL (ref 74–99)
GLUCOSE SERPL-MCNC: 166 MG/DL (ref 74–99)
HAV IGM SERPL QL IA: NONREACTIVE
HBV CORE IGM SERPL QL IA: NONREACTIVE
HBV SURFACE AG SERPL QL IA: NONREACTIVE
HCT VFR BLD AUTO: 28.1 % (ref 37–54)
HCV AB SERPL QL IA: NONREACTIVE
HGB BLD-MCNC: 9 G/DL (ref 12.5–16.5)
HIV 1+2 AB+HIV1 P24 AG SERPL QL IA: NONREACTIVE
IMM GRANULOCYTES # BLD AUTO: <0.03 K/UL (ref 0–0.58)
IMM GRANULOCYTES NFR BLD: 0 % (ref 0–5)
LYMPHOCYTES NFR BLD: 0.77 K/UL (ref 1.5–4)
LYMPHOCYTES RELATIVE PERCENT: 11 % (ref 20–42)
MCH RBC QN AUTO: 29.4 PG (ref 26–35)
MCHC RBC AUTO-ENTMCNC: 32 G/DL (ref 32–34.5)
MCV RBC AUTO: 91.8 FL (ref 80–99.9)
MONOCYTES NFR BLD: 0.45 K/UL (ref 0.1–0.95)
MONOCYTES NFR BLD: 7 % (ref 2–12)
NEUTROPHILS NFR BLD: 79 % (ref 43–80)
NEUTS SEG NFR BLD: 5.35 K/UL (ref 1.8–7.3)
PLATELET # BLD AUTO: 202 K/UL (ref 130–450)
PMV BLD AUTO: 10.4 FL (ref 7–12)
POTASSIUM SERPL-SCNC: 4.1 MMOL/L (ref 3.5–5)
PROT SERPL-MCNC: 5.2 G/DL (ref 6.4–8.3)
PTH-INTACT SERPL-MCNC: 235.4 PG/ML (ref 15–65)
RBC # BLD AUTO: 3.06 M/UL (ref 3.8–5.8)
SODIUM SERPL-SCNC: 138 MMOL/L (ref 132–146)
URATE SERPL-MCNC: 7.3 MG/DL (ref 3.4–7)
WBC OTHER # BLD: 6.8 K/UL (ref 4.5–11.5)

## 2024-10-02 PROCEDURE — 86225 DNA ANTIBODY NATIVE: CPT

## 2024-10-02 PROCEDURE — 36415 COLL VENOUS BLD VENIPUNCTURE: CPT

## 2024-10-02 PROCEDURE — 80074 ACUTE HEPATITIS PANEL: CPT

## 2024-10-02 PROCEDURE — 6360000002 HC RX W HCPCS: Performed by: STUDENT IN AN ORGANIZED HEALTH CARE EDUCATION/TRAINING PROGRAM

## 2024-10-02 PROCEDURE — 82306 VITAMIN D 25 HYDROXY: CPT

## 2024-10-02 PROCEDURE — 6370000000 HC RX 637 (ALT 250 FOR IP): Performed by: GENERAL PRACTICE

## 2024-10-02 PROCEDURE — 84550 ASSAY OF BLOOD/URIC ACID: CPT

## 2024-10-02 PROCEDURE — 86160 COMPLEMENT ANTIGEN: CPT

## 2024-10-02 PROCEDURE — 2060000000 HC ICU INTERMEDIATE R&B

## 2024-10-02 PROCEDURE — 84155 ASSAY OF PROTEIN SERUM: CPT

## 2024-10-02 PROCEDURE — 80053 COMPREHEN METABOLIC PANEL: CPT

## 2024-10-02 PROCEDURE — 86255 FLUORESCENT ANTIBODY SCREEN: CPT

## 2024-10-02 PROCEDURE — 85025 COMPLETE CBC W/AUTO DIFF WBC: CPT

## 2024-10-02 PROCEDURE — 6370000000 HC RX 637 (ALT 250 FOR IP): Performed by: INTERNAL MEDICINE

## 2024-10-02 PROCEDURE — 97535 SELF CARE MNGMENT TRAINING: CPT

## 2024-10-02 PROCEDURE — 82962 GLUCOSE BLOOD TEST: CPT

## 2024-10-02 PROCEDURE — 86039 ANTINUCLEAR ANTIBODIES (ANA): CPT

## 2024-10-02 PROCEDURE — 87389 HIV-1 AG W/HIV-1&-2 AB AG IA: CPT

## 2024-10-02 PROCEDURE — 84165 PROTEIN E-PHORESIS SERUM: CPT

## 2024-10-02 PROCEDURE — 83970 ASSAY OF PARATHORMONE: CPT

## 2024-10-02 PROCEDURE — 86038 ANTINUCLEAR ANTIBODIES: CPT

## 2024-10-02 PROCEDURE — 6370000000 HC RX 637 (ALT 250 FOR IP): Performed by: STUDENT IN AN ORGANIZED HEALTH CARE EDUCATION/TRAINING PROGRAM

## 2024-10-02 PROCEDURE — 83521 IG LIGHT CHAINS FREE EACH: CPT

## 2024-10-02 RX ORDER — ALLOPURINOL 100 MG/1
100 TABLET ORAL DAILY
Status: DISCONTINUED | OUTPATIENT
Start: 2024-10-02 | End: 2024-10-14 | Stop reason: HOSPADM

## 2024-10-02 RX ORDER — CALCITRIOL 0.25 UG/1
0.25 CAPSULE, LIQUID FILLED ORAL
Status: DISCONTINUED | OUTPATIENT
Start: 2024-10-02 | End: 2024-10-14 | Stop reason: HOSPADM

## 2024-10-02 RX ADMIN — Medication 2000 UNITS: at 09:09

## 2024-10-02 RX ADMIN — HYDRALAZINE HYDROCHLORIDE 50 MG: 50 TABLET ORAL at 15:03

## 2024-10-02 RX ADMIN — NITROGLYCERIN 1 INCH: 20 OINTMENT TOPICAL at 15:04

## 2024-10-02 RX ADMIN — PANTOPRAZOLE SODIUM 40 MG: 40 TABLET, DELAYED RELEASE ORAL at 15:03

## 2024-10-02 RX ADMIN — METOPROLOL SUCCINATE 25 MG: 25 TABLET, EXTENDED RELEASE ORAL at 09:09

## 2024-10-02 RX ADMIN — BUMETANIDE 2 MG: 0.25 INJECTION INTRAMUSCULAR; INTRAVENOUS at 09:13

## 2024-10-02 RX ADMIN — INSULIN GLARGINE 30 UNITS: 100 INJECTION, SOLUTION SUBCUTANEOUS at 22:36

## 2024-10-02 RX ADMIN — METOPROLOL SUCCINATE 25 MG: 25 TABLET, EXTENDED RELEASE ORAL at 21:48

## 2024-10-02 RX ADMIN — ROSUVASTATIN CALCIUM 40 MG: 20 TABLET, FILM COATED ORAL at 21:48

## 2024-10-02 RX ADMIN — SERTRALINE HYDROCHLORIDE 50 MG: 50 TABLET ORAL at 09:09

## 2024-10-02 RX ADMIN — DIAZEPAM 5 MG: 5 TABLET ORAL at 21:48

## 2024-10-02 RX ADMIN — CLONIDINE HYDROCHLORIDE 0.2 MG: 0.2 TABLET ORAL at 21:48

## 2024-10-02 RX ADMIN — CLONIDINE HYDROCHLORIDE 0.2 MG: 0.2 TABLET ORAL at 09:09

## 2024-10-02 RX ADMIN — NITROGLYCERIN 1 INCH: 20 OINTMENT TOPICAL at 06:22

## 2024-10-02 RX ADMIN — HYDROXYZINE PAMOATE 25 MG: 25 CAPSULE ORAL at 21:48

## 2024-10-02 RX ADMIN — ALLOPURINOL 100 MG: 100 TABLET ORAL at 15:19

## 2024-10-02 RX ADMIN — Medication 16 G: at 06:22

## 2024-10-02 RX ADMIN — MAGNESIUM OXIDE 400 MG (241.3 MG MAGNESIUM) TABLET 400 MG: TABLET at 21:48

## 2024-10-02 RX ADMIN — HYDRALAZINE HYDROCHLORIDE 50 MG: 50 TABLET ORAL at 21:48

## 2024-10-02 RX ADMIN — PANTOPRAZOLE SODIUM 40 MG: 40 TABLET, DELAYED RELEASE ORAL at 06:22

## 2024-10-02 RX ADMIN — ASPIRIN 81 MG: 81 TABLET, COATED ORAL at 09:09

## 2024-10-02 RX ADMIN — CALCITRIOL 0.25 MCG: 0.25 CAPSULE ORAL at 21:48

## 2024-10-02 RX ADMIN — HYDRALAZINE HYDROCHLORIDE 50 MG: 50 TABLET ORAL at 06:22

## 2024-10-02 RX ADMIN — NITROGLYCERIN 1 INCH: 20 OINTMENT TOPICAL at 21:49

## 2024-10-02 RX ADMIN — DIAZEPAM 5 MG: 5 TABLET ORAL at 09:09

## 2024-10-02 RX ADMIN — MAGNESIUM OXIDE 400 MG (241.3 MG MAGNESIUM) TABLET 400 MG: TABLET at 09:09

## 2024-10-02 ASSESSMENT — PAIN SCALES - GENERAL: PAINLEVEL_OUTOF10: 0

## 2024-10-02 NOTE — PROGRESS NOTES
Patient seen doing better. Echo appreciated. Less edema noted. Renal following . Monitor ckd  lebron. Work up fo myeloma noted.

## 2024-10-02 NOTE — PROGRESS NOTES
[facility]  Nephrology Consult Note  Patient's Name: Santosh Thomas  2:55 PM  10/2/2024    Nephrologist: seen twice inpatient by [facility] in 2018 with no subsequent outpatient follow up    Reason for Consult:  GENIA  Requesting Physician:  Devaughn Reed DO    Chief Complaint:  weakness    History Obtained From:  patient, chart    History of Present Illness:    Santosh Thomas is a 73 y.o. male with past medical history of longstanding type 2 diabetes now on insulin therapy, hypertension, diabetic complications including neuropathy and what sounds to be retinopathy as [facility] and has required injections in the past, toe amputation of the left foot.  He also has history of hypertriglyce so with podiatry.  Ridemia good resolved on fenofibrate presently.  He also has some history of chronic kidney disease, likely stage IIIb based off recent labs that are available to review in the chart.  He has had multiple acute kidney injuries, our practice is seen him on 2 occurrences in 2018, once was felt to be related to rhabdomyolysis.  He did have recovery with IV fluids.  He had also had a history of NSAID use at that time.  He did not have any proteinuria at that time.  He was seen on a separate occasion in 2018 when serum creatinine peaked at 4.0 mg/dL on presentation though quickly improved to a baseline of 1.2 mg/dL.  We have not seen him since.  He has had a baseline serum creatinine of what appears to be 1.3 up to around 2.0 mg/dL over the past 1-1/2 years.  He has not seen nephrology.  Of concern, a urinalysis in January 2023 which showed a significant increase of proteinuria.    Patient presented to the hospital yesterday after being advised by his primary care physician to present.  The patient gives a history of having increased leg swelling, worsening of exertional dyspnea, weakness, fatigue that has worsened rather drastically over the past couple of weeks.  The patient  the last couple of years, 9.0 g/dL today; he has been anemic for the last 2 years  -Will check iron panel and ferritin  -Check B12 and folate  -Given proteinuria, await SPEP and UPEP with serum free light chains to evaluate for any monoclonal gammopathy    6-Nutrition  -Albumin low at 2.6 g/dL, may be reflective of underlying nephrotic syndrome or at least, nephrotic range proteinuria, monitor      Dax Crowder MD  2:55 PM  10/2/2024

## 2024-10-02 NOTE — CARE COORDINATION
Cardiology, Nephrology are following.  Currently on IV bumex. Patient contacted [facility] and set up pill packaging. AM-PAC 17 Patient declined C. Discharge plan will be home when medically stable. Will continue to follow.  Brooke KUMARIN, RN  Case Management

## 2024-10-02 NOTE — PROGRESS NOTES
PROGRESS NOTE       PATIENT PROBLEM LIST:  Patient Active Problem List   Diagnosis Code    Rhabdomyolysis M62.82    Delirium due to another medical condition F05    Diabetic foot infection ([facility]) E11.628, L08.9    Osteomyelitis of great toe of left foot M86.9    DKA, type 1, not at goal ([facility]) E10.10    Diabetic acidosis without coma ([facility]) E11.10    New onset of congestive heart failure ([facility]) I50.9       SUBJECTIVE:  Santosh Thomas states he is concerned that his kidneys are not functioning correctly but denies any shortness of breath or chest discomfort presently.    REVIEW OF SYSTEMS:  General ROS: negative for - fatigue, malaise,  weight gain or weight loss  Psychological ROS: negative for - anxiety , depression  Ophthalmic ROS: negative for - decreased vision or visual distortion.  ENT ROS: negative  Allergy and Immunology ROS: negative  Hematological and Lymphatic ROS: negative  Endocrine: no heat or cold intolerance and no polyphagia, polydipsia, or polyuria  Respiratory ROS: positive for - shortness of breath  Cardiovascular ROS: positive for - dyspnea on exertion and edema.  Gastrointestinal ROS: no abdominal pain, change in bowel habits, or black or bloody stools  Genito-Urinary ROS: no nocturia, dysuria, trouble voiding, frequency or hematuria  Musculoskeletal ROS: negative for- myalgias, arthralgias, or claudication  Neurological ROS: no TIA or stroke symptoms otherwise no significant change in symptoms or problems since yesterday as documented in previous progress notes.    SCHEDULED MEDICATIONS:   bumetanide  2 mg IntraVENous Daily    nitroglycerin  1 inch Topical 3 times per day    aspirin  81 mg Oral QAM    vitamin D  2,000 Units Oral Daily    cloNIDine  0.2 mg Oral BID    diazePAM  5 mg Oral BID    [Held by provider] fenofibrate  160 mg Oral Daily    hydrALAZINE  50 mg Oral 3 times per day    hydrOXYzine pamoate  25 mg Oral Nightly    magnesium oxide  400 mg Oral BID    metoprolol succinate  25 mg

## 2024-10-02 NOTE — PROGRESS NOTES
While rounding the patient is in the room but not available to complete visit. Silently prayers of the Alevism sarah were said for the patient and  remains available for support.

## 2024-10-02 NOTE — PLAN OF CARE
Problem: Chronic Conditions and Co-morbidities  Goal: Patient's chronic conditions and co-morbidity symptoms are monitored and maintained or improved  Outcome: Progressing  Flowsheets (Taken 10/2/2024 3813)  Care Plan - Patient's Chronic Conditions and Co-Morbidity Symptoms are Monitored and Maintained or Improved:   Monitor and assess patient's chronic conditions and comorbid symptoms for stability, deterioration, or improvement   Collaborate with multidisciplinary team to address chronic and comorbid conditions and prevent exacerbation or deterioration     Problem: Safety - Adult  Goal: Free from fall injury  Outcome: Progressing     Problem: Pain  Goal: Verbalizes/displays adequate comfort level or baseline comfort level  Outcome: Progressing

## 2024-10-03 ENCOUNTER — APPOINTMENT (OUTPATIENT)
Dept: GENERAL RADIOLOGY | Age: 74
DRG: 683 | End: 2024-10-03
Payer: MEDICARE

## 2024-10-03 LAB
ALBUMIN SERPL-MCNC: 2.6 G/DL (ref 3.5–5.2)
ALP SERPL-CCNC: 77 U/L (ref 40–129)
ALT SERPL-CCNC: 14 U/L (ref 0–40)
ANION GAP SERPL CALCULATED.3IONS-SCNC: 7 MMOL/L (ref 7–16)
ANTI DNA DOUBLE STRANDED: NEGATIVE
AST SERPL-CCNC: 12 U/L (ref 0–39)
BASOPHILS # BLD: 0.02 K/UL (ref 0–0.2)
BASOPHILS NFR BLD: 0 % (ref 0–2)
BILIRUB SERPL-MCNC: <0.2 MG/DL (ref 0–1.2)
BUN SERPL-MCNC: 44 MG/DL (ref 6–23)
CALCIUM SERPL-MCNC: 7.8 MG/DL (ref 8.6–10.2)
CHLORIDE SERPL-SCNC: 110 MMOL/L (ref 98–107)
CO2 SERPL-SCNC: 21 MMOL/L (ref 22–29)
CREAT SERPL-MCNC: 4.2 MG/DL (ref 0.7–1.2)
EOSINOPHIL # BLD: 0.15 K/UL (ref 0.05–0.5)
EOSINOPHILS RELATIVE PERCENT: 2 % (ref 0–6)
ERYTHROCYTE [DISTWIDTH] IN BLOOD BY AUTOMATED COUNT: 16.1 % (ref 11.5–15)
FERRITIN SERPL-MCNC: 36 NG/ML
GFR, ESTIMATED: 14 ML/MIN/1.73M2
GLUCOSE BLD-MCNC: 113 MG/DL (ref 74–99)
GLUCOSE BLD-MCNC: 174 MG/DL (ref 74–99)
GLUCOSE BLD-MCNC: 205 MG/DL (ref 74–99)
GLUCOSE BLD-MCNC: 83 MG/DL (ref 74–99)
GLUCOSE SERPL-MCNC: 92 MG/DL (ref 74–99)
HCT VFR BLD AUTO: 26.4 % (ref 37–54)
HGB BLD-MCNC: 8.2 G/DL (ref 12.5–16.5)
IMM GRANULOCYTES # BLD AUTO: 0.03 K/UL (ref 0–0.58)
IMM GRANULOCYTES NFR BLD: 1 % (ref 0–5)
IRON SATN MFR SERPL: 13 % (ref 20–55)
IRON SERPL-MCNC: 29 UG/DL (ref 59–158)
LYMPHOCYTES NFR BLD: 0.96 K/UL (ref 1.5–4)
LYMPHOCYTES RELATIVE PERCENT: 15 % (ref 20–42)
MCH RBC QN AUTO: 29.3 PG (ref 26–35)
MCHC RBC AUTO-ENTMCNC: 31.1 G/DL (ref 32–34.5)
MCV RBC AUTO: 94.3 FL (ref 80–99.9)
MONOCYTES NFR BLD: 0.47 K/UL (ref 0.1–0.95)
MONOCYTES NFR BLD: 7 % (ref 2–12)
NEUTROPHILS NFR BLD: 75 % (ref 43–80)
NEUTS SEG NFR BLD: 4.87 K/UL (ref 1.8–7.3)
PHOSPHATE SERPL-MCNC: 5.1 MG/DL (ref 2.5–4.5)
PLATELET # BLD AUTO: 177 K/UL (ref 130–450)
PMV BLD AUTO: 9.9 FL (ref 7–12)
POTASSIUM SERPL-SCNC: 4.2 MMOL/L (ref 3.5–5)
PROT SERPL-MCNC: 4.8 G/DL (ref 6.4–8.3)
RBC # BLD AUTO: 2.8 M/UL (ref 3.8–5.8)
SODIUM SERPL-SCNC: 138 MMOL/L (ref 132–146)
TIBC SERPL-MCNC: 227 UG/DL (ref 250–450)
WBC OTHER # BLD: 6.5 K/UL (ref 4.5–11.5)

## 2024-10-03 PROCEDURE — 85025 COMPLETE CBC W/AUTO DIFF WBC: CPT

## 2024-10-03 PROCEDURE — 6360000002 HC RX W HCPCS: Performed by: STUDENT IN AN ORGANIZED HEALTH CARE EDUCATION/TRAINING PROGRAM

## 2024-10-03 PROCEDURE — 84100 ASSAY OF PHOSPHORUS: CPT

## 2024-10-03 PROCEDURE — 82962 GLUCOSE BLOOD TEST: CPT

## 2024-10-03 PROCEDURE — 83550 IRON BINDING TEST: CPT

## 2024-10-03 PROCEDURE — 99222 1ST HOSP IP/OBS MODERATE 55: CPT

## 2024-10-03 PROCEDURE — 94640 AIRWAY INHALATION TREATMENT: CPT

## 2024-10-03 PROCEDURE — 6370000000 HC RX 637 (ALT 250 FOR IP): Performed by: GENERAL PRACTICE

## 2024-10-03 PROCEDURE — 2580000003 HC RX 258: Performed by: STUDENT IN AN ORGANIZED HEALTH CARE EDUCATION/TRAINING PROGRAM

## 2024-10-03 PROCEDURE — 83540 ASSAY OF IRON: CPT

## 2024-10-03 PROCEDURE — 71045 X-RAY EXAM CHEST 1 VIEW: CPT

## 2024-10-03 PROCEDURE — 84156 ASSAY OF PROTEIN URINE: CPT

## 2024-10-03 PROCEDURE — 6360000002 HC RX W HCPCS: Performed by: GENERAL PRACTICE

## 2024-10-03 PROCEDURE — 82728 ASSAY OF FERRITIN: CPT

## 2024-10-03 PROCEDURE — 6370000000 HC RX 637 (ALT 250 FOR IP): Performed by: STUDENT IN AN ORGANIZED HEALTH CARE EDUCATION/TRAINING PROGRAM

## 2024-10-03 PROCEDURE — 80053 COMPREHEN METABOLIC PANEL: CPT

## 2024-10-03 PROCEDURE — 2060000000 HC ICU INTERMEDIATE R&B

## 2024-10-03 PROCEDURE — 82570 ASSAY OF URINE CREATININE: CPT

## 2024-10-03 RX ORDER — BUMETANIDE 0.25 MG/ML
2 INJECTION, SOLUTION INTRAMUSCULAR; INTRAVENOUS 2 TIMES DAILY
Status: DISCONTINUED | OUTPATIENT
Start: 2024-10-03 | End: 2024-10-10

## 2024-10-03 RX ORDER — ALBUTEROL SULFATE 0.83 MG/ML
2.5 SOLUTION RESPIRATORY (INHALATION) EVERY 4 HOURS PRN
Status: DISCONTINUED | OUTPATIENT
Start: 2024-10-03 | End: 2024-10-14 | Stop reason: HOSPADM

## 2024-10-03 RX ORDER — ALBUTEROL SULFATE 0.83 MG/ML
2.5 SOLUTION RESPIRATORY (INHALATION) EVERY 6 HOURS PRN
Status: DISCONTINUED | OUTPATIENT
Start: 2024-10-03 | End: 2024-10-03

## 2024-10-03 RX ADMIN — DIAZEPAM 5 MG: 5 TABLET ORAL at 09:40

## 2024-10-03 RX ADMIN — ROSUVASTATIN CALCIUM 40 MG: 20 TABLET, FILM COATED ORAL at 20:27

## 2024-10-03 RX ADMIN — METOPROLOL SUCCINATE 25 MG: 25 TABLET, EXTENDED RELEASE ORAL at 20:27

## 2024-10-03 RX ADMIN — SODIUM CHLORIDE 25 MG: 9 INJECTION, SOLUTION INTRAVENOUS at 13:19

## 2024-10-03 RX ADMIN — ACETAMINOPHEN 650 MG: 325 TABLET ORAL at 13:21

## 2024-10-03 RX ADMIN — ALLOPURINOL 100 MG: 100 TABLET ORAL at 09:40

## 2024-10-03 RX ADMIN — BUMETANIDE 2 MG: 0.25 INJECTION INTRAMUSCULAR; INTRAVENOUS at 09:40

## 2024-10-03 RX ADMIN — ASPIRIN 81 MG: 81 TABLET, COATED ORAL at 09:40

## 2024-10-03 RX ADMIN — HYDRALAZINE HYDROCHLORIDE 50 MG: 50 TABLET ORAL at 22:47

## 2024-10-03 RX ADMIN — CLONIDINE HYDROCHLORIDE 0.2 MG: 0.2 TABLET ORAL at 20:27

## 2024-10-03 RX ADMIN — PANTOPRAZOLE SODIUM 40 MG: 40 TABLET, DELAYED RELEASE ORAL at 16:58

## 2024-10-03 RX ADMIN — MAGNESIUM OXIDE 400 MG (241.3 MG MAGNESIUM) TABLET 400 MG: TABLET at 20:27

## 2024-10-03 RX ADMIN — PANTOPRAZOLE SODIUM 40 MG: 40 TABLET, DELAYED RELEASE ORAL at 06:23

## 2024-10-03 RX ADMIN — INSULIN GLARGINE 30 UNITS: 100 INJECTION, SOLUTION SUBCUTANEOUS at 20:27

## 2024-10-03 RX ADMIN — SODIUM CHLORIDE 100 MG: 9 INJECTION, SOLUTION INTRAVENOUS at 14:48

## 2024-10-03 RX ADMIN — HYDROXYZINE PAMOATE 25 MG: 25 CAPSULE ORAL at 20:27

## 2024-10-03 RX ADMIN — HYDRALAZINE HYDROCHLORIDE 50 MG: 50 TABLET ORAL at 06:23

## 2024-10-03 RX ADMIN — CLONIDINE HYDROCHLORIDE 0.2 MG: 0.2 TABLET ORAL at 09:40

## 2024-10-03 RX ADMIN — NITROGLYCERIN 1 INCH: 20 OINTMENT TOPICAL at 22:47

## 2024-10-03 RX ADMIN — METOPROLOL SUCCINATE 25 MG: 25 TABLET, EXTENDED RELEASE ORAL at 09:40

## 2024-10-03 RX ADMIN — SERTRALINE HYDROCHLORIDE 50 MG: 50 TABLET ORAL at 09:40

## 2024-10-03 RX ADMIN — MAGNESIUM OXIDE 400 MG (241.3 MG MAGNESIUM) TABLET 400 MG: TABLET at 09:40

## 2024-10-03 RX ADMIN — DIAZEPAM 5 MG: 5 TABLET ORAL at 20:27

## 2024-10-03 RX ADMIN — ALBUTEROL SULFATE 2.5 MG: 2.5 SOLUTION RESPIRATORY (INHALATION) at 20:54

## 2024-10-03 RX ADMIN — BUMETANIDE 2 MG: 0.25 INJECTION INTRAMUSCULAR; INTRAVENOUS at 20:28

## 2024-10-03 RX ADMIN — NITROGLYCERIN 1 INCH: 20 OINTMENT TOPICAL at 06:32

## 2024-10-03 RX ADMIN — Medication 2000 UNITS: at 09:40

## 2024-10-03 RX ADMIN — NITROGLYCERIN 1 INCH: 20 OINTMENT TOPICAL at 16:58

## 2024-10-03 ASSESSMENT — PAIN DESCRIPTION - LOCATION: LOCATION: HEAD

## 2024-10-03 ASSESSMENT — PAIN DESCRIPTION - DESCRIPTORS: DESCRIPTORS: ACHING;DULL

## 2024-10-03 ASSESSMENT — PAIN SCALES - GENERAL
PAINLEVEL_OUTOF10: 3
PAINLEVEL_OUTOF10: 0

## 2024-10-03 ASSESSMENT — PAIN - FUNCTIONAL ASSESSMENT: PAIN_FUNCTIONAL_ASSESSMENT: ACTIVITIES ARE NOT PREVENTED

## 2024-10-03 NOTE — CARE COORDINATION
Per Palliative Care patient would like to completed new HCPOA and change from his son, Santosh Thomas Jr. to ex-wife Latonia Thomas and sister-in-law Lou Tejada. CM printed and provided POA forms. Currently on bumex gtt. AM-PAC 17 Patient declined HHC. Discharge plan will be home when medically stable. Will continue to follow.  Brooke KUMARIN, RN  Case Management

## 2024-10-03 NOTE — PLAN OF CARE
Problem: ABCDS Injury Assessment  Goal: Absence of physical injury  10/3/2024 1238 by Sarah Clark RN  Outcome: Progressing  10/3/2024 0029 by Lisa Pal RN  Outcome: Progressing     Problem: Discharge Planning  Goal: Discharge to home or other facility with appropriate resources  10/3/2024 1238 by Sarah Clark RN  Outcome: Progressing  Flowsheets (Taken 10/3/2024 0930)  Discharge to home or other facility with appropriate resources:   Identify barriers to discharge with patient and caregiver   Identify discharge learning needs (meds, wound care, etc)  10/3/2024 0029 by Lisa Pal, RN  Outcome: Progressing  Flowsheets (Taken 10/2/2024 2158 by Yodit Hutchison, RN)  Discharge to home or other facility with appropriate resources: Identify barriers to discharge with patient and caregiver     Problem: Chronic Conditions and Co-morbidities  Goal: Patient's chronic conditions and co-morbidity symptoms are monitored and maintained or improved  10/3/2024 1238 by Sarah Clark RN  Outcome: Progressing  Flowsheets (Taken 10/3/2024 0930)  Care Plan - Patient's Chronic Conditions and Co-Morbidity Symptoms are Monitored and Maintained or Improved:   Monitor and assess patient's chronic conditions and comorbid symptoms for stability, deterioration, or improvement   Collaborate with multidisciplinary team to address chronic and comorbid conditions and prevent exacerbation or deterioration  10/3/2024 0029 by Lisa Pal RN  Outcome: Progressing  Flowsheets (Taken 10/2/2024 2158 by Yodit Hutchison RN)  Care Plan - Patient's Chronic Conditions and Co-Morbidity Symptoms are Monitored and Maintained or Improved:   Monitor and assess patient's chronic conditions and comorbid symptoms for stability, deterioration, or improvement   Collaborate with multidisciplinary team to address chronic and comorbid conditions and prevent exacerbation or deterioration     Problem: Safety - Adult  Goal: Free from fall

## 2024-10-03 NOTE — PLAN OF CARE
Problem: ABCDS Injury Assessment  Goal: Absence of physical injury  Outcome: Progressing     Problem: Discharge Planning  Goal: Discharge to home or other facility with appropriate resources  Outcome: Progressing  Flowsheets (Taken 10/2/2024 2158 by Yodit Hutchison, RN)  Discharge to home or other facility with appropriate resources: Identify barriers to discharge with patient and caregiver     Problem: Chronic Conditions and Co-morbidities  Goal: Patient's chronic conditions and co-morbidity symptoms are monitored and maintained or improved  10/3/2024 0029 by Lisa Pal, RN  Outcome: Progressing  Flowsheets (Taken 10/2/2024 2158 by Yodit Hutchison, RN)  Care Plan - Patient's Chronic Conditions and Co-Morbidity Symptoms are Monitored and Maintained or Improved:   Monitor and assess patient's chronic conditions and comorbid symptoms for stability, deterioration, or improvement   Collaborate with multidisciplinary team to address chronic and comorbid conditions and prevent exacerbation or deterioration     Problem: Safety - Adult  Goal: Free from fall injury  10/3/2024 0029 by Lisa Pal, RN  Outcome: Progressing     Problem: Pain  Goal: Verbalizes/displays adequate comfort level or baseline comfort level  10/3/2024 0029 by Lisa Pal, RN  Outcome: Progressing

## 2024-10-03 NOTE — PROGRESS NOTES
Spiritual Health History and Assessment/Progress Note  [facility]    Palliative Care (Ca[employer]ism communion),  ,  ,      Name: Santosh Thomas MRN: [id number]    Age: 73 y.o.     Sex: male   Language: English   Hoahaoism: Congregation   New onset of congestive heart failure (HCC)     Date: 10/3/2024                           Spiritual Assessment began in [facility]        Referral/Consult From: Palliative Care   Encounter Overview/Reason: [facility]  Service Provided For: Patient and family together    Juhi, Belief, Meaning:   Patient identifies as spiritual, is connected with a juhi tradition or spiritual practice, and has beliefs or practices that help with coping during difficult times  Family/Friends identify as spiritual, are connected with a juhi tradition or spiritual practice, and have beliefs or practices that help with coping during difficult times      Importance and Influence:  Patient has spiritual/personal beliefs that influence decisions regarding their health  Family/Friends have spiritual/personal beliefs that influence decisions regarding the patient's health    Community:  Patient feels well-supported. Support system includes: Children and Extended family  Family/Friends feel well-supported. Support system includes: Spouse/Partner, Children, and Extended family    Assessment and Plan of Care:     Patient Interventions include: Facilitated expression of thoughts and feelings, Explored spiritual coping/struggle/distress, Engaged in theological reflection, and Affirmed coping skills/support systems  Family/Friends Interventions include: Facilitated expression of thoughts and feelings, Explored spiritual coping/struggle/distress, Engaged in theological reflection, and Affirmed coping skills/support systems    Patient Plan of Care: Spiritual Care available upon further referral  Family/Friends Plan of Care: No spiritual needs identified for

## 2024-10-03 NOTE — CONSULTS
Palliative Care Department  [phone]  Palliative Care Initial Consult  Provider Sosa Green, ANDRAE - CNP      PATIENT: Santosh Thomas  : 1950  MRN: [id number]  ADMISSION DATE: 2024  1:42 PM  Referring Provider: Devaughn Reed DO     Palliative Medicine was consulted on hospital day 3 for assistance with Goals of care    HPI:     Clinical Summary:Santosh Thomas is a 73 y.o. y/o male with a history of diabetes, hypertension, dementia who presented to [facility] on 2024 with high blood pressure, bilateral lower extremity edema, and 11 pound weight gain in past week, initially seen by PCP, send him for x-ray images, and had a near syncopal episode while trying to do x-ray, he arrived to ED via EMS.  Significant laboratory findings showed BUN 41> 42, creatinine 4. 0> 4.2, troponin 167, proBNP 34,195, albumin 2.6, hemoglobin 9.0 > 8.2, D-dimer 906.  Admitted for further medical management.  Cardiology and nephrology following.  Echocardiogram showed moderately reduced left ventricular systolic function.  EF 40%.  On IV Bumex.  Palliative medicine consulted to assist further with goals of care.    ASSESSMENT/PLAN:     Pertinent Hospital Diagnoses     Hypertension  GENIA on CKD  CHF      Palliative Care Encounter / Counseling Regarding Goals of Care  Please see detailed goals of care discussion as below  At this time, Santosh Thomas, Does have capacity for medical decision-making.  Capacity is time limited and situation/question specific  Outcome of goals of care meeting:  Continue with current medical treatment  CODE STATUS discussed and established DNR CCA, form completed in presence of chart  Patient will consider short course of HD and short-term ventilation support  He is not in good terms with his son, aware of current HCPOA in our system, he believe he had updated document, left message to ex sister-in-law to confirm  If documents are not available, he would like to complete new HCPOA  is to continue to pursue treatment, however he does not want to be resuscitated if his heart were to stop.  Limited code versus DNR CCA discussed, he tells me that he will consider short-term ventilation support.  He also informed, if he were to require hemodialysis treatment, he would consider HD for a short-term as well.  At discharge, his goal is to return home, where he states live dependently.  We will continue to follow.         Prognosis: Guarded    OBJECTIVE:     /71   Pulse 59   Temp 97.5 °F (36.4 °C) (Oral)   Resp 18   Ht 1.676 m (5' 6\")   Wt 88.3 kg (194 lb 9.6 oz)   SpO2 99%   BMI 31.41 kg/m²     Physical Examination:  Gen: elderly, awake, alert   Lungs: respirations easy and not labored,   Heart: regular rate and rhythm, distant heart tones,   Abdomen: normoactive bowel sounds, soft, non-tender  Extremities: no clubbing, cyanosis or edema, moving all extremities    Skin: warm, dry without rashes, lesions, bruising  Neuro: awake, alert, oriented x 3, follows commands, no gross neurologic deficit    Objective data reviewed: labs, images, records, medication use, vitals, and chart    Time/Communication  Greater than 50% of time spent, total 55 minutes in counseling and coordination of care at the bedside regarding goals of care.    Thank you for allowing Palliative Medicine to participate in the care of Santosh Thomas.    Note: This report was completed using computerLabNow voiced recognition software.  Every effort has been made to ensure accuracy; however, inadvertent computerized transcription errors may be present.

## 2024-10-03 NOTE — PROGRESS NOTES
Velasquez Wilcox DPM at [facility] OR       History reviewed. No pertinent family history.     reports that he has been smoking cigarettes. He has been exposed to tobacco smoke. He has never used smokeless tobacco. He reports current drug use. He reports that he does not drink alcohol.    Allergies:  Patient has no known allergies.        Current Facility-Administered Medications   Medication Dose Route Frequency Provider Last Rate Last Admin    ferric gluconate (FERRLECIT) 25 mg in sodium chloride 0.9 % 50 mL (test dose) IVPB  25 mg IntraVENous Once Dax Crowder MD 52 mL/hr at 10/03/24 1319 25 mg at 10/03/24 1319    Followed by    ferric gluconate (FERRLECIT) 100 mg in sodium chloride 0.9 % 100 mL IVPB  100 mg IntraVENous Once Dax Crowder MD        Followed by    [START ON 10/4/2024] ferric gluconate (FERRLECIT) 125 mg in sodium chloride 0.9 % 100 mL IVPB  125 mg IntraVENous Daily Dax Crowder MD        calcitRIOL (ROCALTROL) capsule 0.25 mcg  0.25 mcg Oral Once per day on Monday Wednesday Friday Dax Crowder MD   0.25 mcg at 10/02/24 2148    allopurinol (ZYLOPRIM) tablet 100 mg  100 mg Oral Daily Dax Crowder MD   100 mg at 10/03/24 0940    bumetanide (BUMEX) injection 2 mg  2 mg IntraVENous Daily Dax Crowder MD   2 mg at 10/03/24 0940    nitroglycerin (NITRO-BID) 2 % ointment 1 inch  1 inch Topical 3 times per day Rancho Boone DO   1 inch at 10/03/24 0632    acetaminophen (TYLENOL) tablet 650 mg  650 mg Oral Q4H PRN Devaughn Reed DO   650 mg at 10/03/24 1321    aspirin EC tablet 81 mg  81 mg Oral QAM Devaughn Reed DO   81 mg at 10/03/24 0940    vitamin D (CHOLECALCIFEROL) tablet 2,000 Units  2,000 Units Oral Daily Devaughn Reed DO   2,000 Units at 10/03/24 0940    cloNIDine (CATAPRES) tablet 0.2 mg  0.2 mg Oral BID Devaughn Reed DO   0.2 mg at 10/03/24 0940    diazePAM (VALIUM) tablet 5 mg  5 mg Oral BID Devaughn Reed DO   5 mg at 10/03/24 0940    [Held by provider] fenofibrate  tablet 160 mg  160 mg Oral Daily Devaughn Reed DO        hydrALAZINE (APRESOLINE) tablet 50 mg  50 mg Oral 3 times per day Devaughn Reed DO   50 mg at 10/03/24 0623    hydrOXYzine pamoate (VISTARIL) capsule 25 mg  25 mg Oral Nightly Devaughn Reed DO   25 mg at 10/02/24 2148    magnesium oxide (MAG-OX) tablet 400 mg  400 mg Oral BID Devaughn Reed DO   400 mg at 10/03/24 0940    metoprolol succinate (TOPROL XL) extended release tablet 25 mg  25 mg Oral BID Devaughn Reed DO   25 mg at 10/03/24 0940    pantoprazole (PROTONIX) tablet 40 mg  40 mg Oral BID AC Devaughn Reed DO   40 mg at 10/03/24 0623    rosuvastatin (CRESTOR) tablet 40 mg  40 mg Oral Nightly Devaughn Reed DO   40 mg at 10/02/24 2148    sertraline (ZOLOFT) tablet 50 mg  50 mg Oral QAM Devaughn Reed DO   50 mg at 10/03/24 0940    insulin glargine (LANTUS) injection vial 30 Units  30 Units SubCUTAneous Nightly Devaughn Reed DO   30 Units at 10/02/24 2236    glucose chewable tablet 16 g  4 tablet Oral PRN Devaughn Reed DO   16 g at 10/02/24 0622    dextrose bolus 10% 125 mL  125 mL IntraVENous PRN Devaughn Reed DO        Or    dextrose bolus 10% 250 mL  250 mL IntraVENous PRN Devaughn Reed DO        glucagon injection 1 mg  1 mg SubCUTAneous PRN Devaughn Reed DO        dextrose 10 % infusion   IntraVENous Continuous PRN Devaughn Reed DO        insulin lispro (HUMALOG,ADMELOG) injection vial 0-4 Units  0-4 Units SubCUTAneous TID WC Devaughn Reed DO        insulin lispro (HUMALOG,ADMELOG) injection vial 0-4 Units  0-4 Units SubCUTAneous Nightly Devaughn Reed DO               PHYSICAL EXAM:     Vital SignsBP (!) 112/53   Pulse 63   Temp 98.1 °F (36.7 °C) (Oral)   Resp 18   Ht 1.676 m (5' 6\")   Wt 88.3 kg (194 lb 9.6 oz)   SpO2 99%   BMI 31.41 kg/m²   VITALS:  BP (!) 112/53   Pulse 63   Temp 98.1 °F (36.7 °C) (Oral)   Resp 18   Ht 1.676 m (5' 6\")   Wt 88.3 kg (194 lb 9.6 oz)   SpO2 99%   BMI 31.41 kg/m²   24HR  MD Vel  1:40 PM  10/3/2024

## 2024-10-03 NOTE — PROGRESS NOTES
Physical Therapy  Facility/Department: [address] MED SURG    NAME: Santosh Thomas  : 1950  MRN: [id number]    Chart reviewed and PT treatment attempted this am.  Pt declined at this time.  Will check back at later time/date.     Violetta Dorsey, PT [id number]

## 2024-10-04 LAB
ALBUMIN SERPL-MCNC: 2.2 G/DL (ref 3.5–4.7)
ALBUMIN SERPL-MCNC: 2.9 G/DL (ref 3.5–5.2)
ALP SERPL-CCNC: 100 U/L (ref 40–129)
ALPHA1 GLOB SERPL ELPH-MCNC: 0.2 G/DL (ref 0.2–0.4)
ALPHA2 GLOB SERPL ELPH-MCNC: 0.6 G/DL (ref 0.5–1)
ALT SERPL-CCNC: 17 U/L (ref 0–40)
ANA SER QL IA: NEGATIVE
ANION GAP SERPL CALCULATED.3IONS-SCNC: 10 MMOL/L (ref 7–16)
AST SERPL-CCNC: 19 U/L (ref 0–39)
B-GLOBULIN SERPL ELPH-MCNC: 0.8 G/DL (ref 0.8–1.3)
BASOPHILS # BLD: 0.11 K/UL (ref 0–0.2)
BASOPHILS NFR BLD: 2 % (ref 0–2)
BILIRUB SERPL-MCNC: <0.2 MG/DL (ref 0–1.2)
BUN SERPL-MCNC: 47 MG/DL (ref 6–23)
CALCIUM SERPL-MCNC: 8.3 MG/DL (ref 8.6–10.2)
CHLORIDE SERPL-SCNC: 108 MMOL/L (ref 98–107)
CO2 SERPL-SCNC: 20 MMOL/L (ref 22–29)
CREAT SERPL-MCNC: 4.5 MG/DL (ref 0.7–1.2)
EOSINOPHIL # BLD: 0 K/UL (ref 0.05–0.5)
EOSINOPHILS RELATIVE PERCENT: 0 % (ref 0–6)
ERYTHROCYTE [DISTWIDTH] IN BLOOD BY AUTOMATED COUNT: 15.9 % (ref 11.5–15)
FOLATE SERPL-MCNC: 13.3 NG/ML (ref 4.8–24.2)
FREE KAPPA/LAMBDA RATIO: 0.95 (ref 0.22–1.74)
GAMMA GLOB SERPL ELPH-MCNC: 0.7 G/DL (ref 0.7–1.6)
GFR, ESTIMATED: 13 ML/MIN/1.73M2
GLUCOSE BLD-MCNC: 127 MG/DL (ref 74–99)
GLUCOSE BLD-MCNC: 53 MG/DL (ref 74–99)
GLUCOSE BLD-MCNC: 82 MG/DL (ref 74–99)
GLUCOSE BLD-MCNC: 82 MG/DL (ref 74–99)
GLUCOSE BLD-MCNC: 90 MG/DL (ref 74–99)
GLUCOSE SERPL-MCNC: 94 MG/DL (ref 74–99)
HCT VFR BLD AUTO: 29.2 % (ref 37–54)
HGB BLD-MCNC: 9.1 G/DL (ref 12.5–16.5)
KAPPA LC FREE SER-MCNC: 79 MG/L
LAMBDA LC FREE SERPL-MCNC: 82.9 MG/L (ref 4.2–27.7)
LYMPHOCYTES NFR BLD: 0.71 K/UL (ref 1.5–4)
LYMPHOCYTES RELATIVE PERCENT: 11 % (ref 20–42)
MCH RBC QN AUTO: 29.1 PG (ref 26–35)
MCHC RBC AUTO-ENTMCNC: 31.2 G/DL (ref 32–34.5)
MCV RBC AUTO: 93.3 FL (ref 80–99.9)
METAMYELOCYTES ABSOLUTE COUNT: 0.05 K/UL (ref 0–0.12)
METAMYELOCYTES: 1 % (ref 0–1)
MONOCYTES NFR BLD: 0.49 K/UL (ref 0.1–0.95)
MONOCYTES NFR BLD: 8 % (ref 2–12)
NEUTROPHILS NFR BLD: 78 % (ref 43–80)
NEUTS SEG NFR BLD: 4.93 K/UL (ref 1.8–7.3)
P E INTERPRETATION, U: NORMAL
PATHOLOGIST: ABNORMAL
PATHOLOGIST: NORMAL
PHOSPHATE SERPL-MCNC: 5.3 MG/DL (ref 2.5–4.5)
PLATELET # BLD AUTO: 180 K/UL (ref 130–450)
PMV BLD AUTO: 9.7 FL (ref 7–12)
POTASSIUM SERPL-SCNC: 4.5 MMOL/L (ref 3.5–5)
PROT PATTERN SERPL ELPH-IMP: ABNORMAL
PROT SERPL-MCNC: 4.5 G/DL (ref 6.4–8.3)
PROT SERPL-MCNC: 5.6 G/DL (ref 6.4–8.3)
RBC # BLD AUTO: 3.13 M/UL (ref 3.8–5.8)
RBC # BLD: ABNORMAL 10*6/UL
SODIUM SERPL-SCNC: 138 MMOL/L (ref 132–146)
SPECIMEN TYPE: NORMAL
VIT B12 SERPL-MCNC: 340 PG/ML (ref 211–946)
WBC OTHER # BLD: 6.3 K/UL (ref 4.5–11.5)

## 2024-10-04 PROCEDURE — 6370000000 HC RX 637 (ALT 250 FOR IP): Performed by: STUDENT IN AN ORGANIZED HEALTH CARE EDUCATION/TRAINING PROGRAM

## 2024-10-04 PROCEDURE — 2580000003 HC RX 258: Performed by: STUDENT IN AN ORGANIZED HEALTH CARE EDUCATION/TRAINING PROGRAM

## 2024-10-04 PROCEDURE — 85025 COMPLETE CBC W/AUTO DIFF WBC: CPT

## 2024-10-04 PROCEDURE — 84100 ASSAY OF PHOSPHORUS: CPT

## 2024-10-04 PROCEDURE — 82746 ASSAY OF FOLIC ACID SERUM: CPT

## 2024-10-04 PROCEDURE — 82962 GLUCOSE BLOOD TEST: CPT

## 2024-10-04 PROCEDURE — 2060000000 HC ICU INTERMEDIATE R&B

## 2024-10-04 PROCEDURE — 97535 SELF CARE MNGMENT TRAINING: CPT

## 2024-10-04 PROCEDURE — 80053 COMPREHEN METABOLIC PANEL: CPT

## 2024-10-04 PROCEDURE — 6370000000 HC RX 637 (ALT 250 FOR IP): Performed by: GENERAL PRACTICE

## 2024-10-04 PROCEDURE — 6360000002 HC RX W HCPCS: Performed by: GENERAL PRACTICE

## 2024-10-04 PROCEDURE — 82607 VITAMIN B-12: CPT

## 2024-10-04 PROCEDURE — 94640 AIRWAY INHALATION TREATMENT: CPT

## 2024-10-04 PROCEDURE — 6360000002 HC RX W HCPCS: Performed by: STUDENT IN AN ORGANIZED HEALTH CARE EDUCATION/TRAINING PROGRAM

## 2024-10-04 PROCEDURE — 6370000000 HC RX 637 (ALT 250 FOR IP): Performed by: INTERNAL MEDICINE

## 2024-10-04 RX ORDER — METOLAZONE 2.5 MG/1
2.5 TABLET ORAL ONCE
Status: COMPLETED | OUTPATIENT
Start: 2024-10-04 | End: 2024-10-04

## 2024-10-04 RX ORDER — AMLODIPINE BESYLATE 5 MG/1
5 TABLET ORAL DAILY
Status: DISCONTINUED | OUTPATIENT
Start: 2024-10-04 | End: 2024-10-05

## 2024-10-04 RX ADMIN — METOPROLOL SUCCINATE 25 MG: 25 TABLET, EXTENDED RELEASE ORAL at 22:05

## 2024-10-04 RX ADMIN — AMLODIPINE BESYLATE 5 MG: 5 TABLET ORAL at 10:05

## 2024-10-04 RX ADMIN — MAGNESIUM OXIDE 400 MG (241.3 MG MAGNESIUM) TABLET 400 MG: TABLET at 09:55

## 2024-10-04 RX ADMIN — CLONIDINE HYDROCHLORIDE 0.2 MG: 0.2 TABLET ORAL at 09:53

## 2024-10-04 RX ADMIN — PANTOPRAZOLE SODIUM 40 MG: 40 TABLET, DELAYED RELEASE ORAL at 05:59

## 2024-10-04 RX ADMIN — HYDRALAZINE HYDROCHLORIDE 50 MG: 50 TABLET ORAL at 05:59

## 2024-10-04 RX ADMIN — SODIUM CHLORIDE 125 MG: 900 INJECTION INTRAVENOUS at 13:39

## 2024-10-04 RX ADMIN — SERTRALINE HYDROCHLORIDE 50 MG: 50 TABLET ORAL at 09:55

## 2024-10-04 RX ADMIN — BUMETANIDE 2 MG: 0.25 INJECTION INTRAMUSCULAR; INTRAVENOUS at 22:05

## 2024-10-04 RX ADMIN — ASPIRIN 81 MG: 81 TABLET, COATED ORAL at 09:55

## 2024-10-04 RX ADMIN — NITROGLYCERIN 1 INCH: 20 OINTMENT TOPICAL at 15:11

## 2024-10-04 RX ADMIN — HYDRALAZINE HYDROCHLORIDE 50 MG: 50 TABLET ORAL at 22:05

## 2024-10-04 RX ADMIN — ALLOPURINOL 100 MG: 100 TABLET ORAL at 09:55

## 2024-10-04 RX ADMIN — ALBUTEROL SULFATE 2.5 MG: 2.5 SOLUTION RESPIRATORY (INHALATION) at 03:18

## 2024-10-04 RX ADMIN — SACUBITRIL AND VALSARTAN 0.5 TABLET: 24; 26 TABLET, FILM COATED ORAL at 13:18

## 2024-10-04 RX ADMIN — INSULIN GLARGINE 30 UNITS: 100 INJECTION, SOLUTION SUBCUTANEOUS at 22:06

## 2024-10-04 RX ADMIN — ROSUVASTATIN CALCIUM 40 MG: 20 TABLET, FILM COATED ORAL at 22:05

## 2024-10-04 RX ADMIN — NITROGLYCERIN 1 INCH: 20 OINTMENT TOPICAL at 05:59

## 2024-10-04 RX ADMIN — CALCITRIOL 0.25 MCG: 0.25 CAPSULE ORAL at 22:05

## 2024-10-04 RX ADMIN — HYDROXYZINE PAMOATE 25 MG: 25 CAPSULE ORAL at 22:05

## 2024-10-04 RX ADMIN — DIAZEPAM 5 MG: 5 TABLET ORAL at 09:55

## 2024-10-04 RX ADMIN — MAGNESIUM OXIDE 400 MG (241.3 MG MAGNESIUM) TABLET 400 MG: TABLET at 22:05

## 2024-10-04 RX ADMIN — NITROGLYCERIN 1 INCH: 20 OINTMENT TOPICAL at 22:06

## 2024-10-04 RX ADMIN — BUMETANIDE 2 MG: 0.25 INJECTION INTRAMUSCULAR; INTRAVENOUS at 10:36

## 2024-10-04 RX ADMIN — ACETAMINOPHEN 650 MG: 325 TABLET ORAL at 10:35

## 2024-10-04 RX ADMIN — HYDRALAZINE HYDROCHLORIDE 50 MG: 50 TABLET ORAL at 15:11

## 2024-10-04 RX ADMIN — METOLAZONE 2.5 MG: 2.5 TABLET ORAL at 14:11

## 2024-10-04 RX ADMIN — Medication 2000 UNITS: at 09:55

## 2024-10-04 RX ADMIN — DIAZEPAM 5 MG: 5 TABLET ORAL at 22:05

## 2024-10-04 RX ADMIN — METOPROLOL SUCCINATE 25 MG: 25 TABLET, EXTENDED RELEASE ORAL at 09:55

## 2024-10-04 RX ADMIN — PANTOPRAZOLE SODIUM 40 MG: 40 TABLET, DELAYED RELEASE ORAL at 15:11

## 2024-10-04 RX ADMIN — ACETAMINOPHEN 650 MG: 325 TABLET ORAL at 17:06

## 2024-10-04 RX ADMIN — CLONIDINE HYDROCHLORIDE 0.2 MG: 0.2 TABLET ORAL at 22:05

## 2024-10-04 ASSESSMENT — PAIN SCALES - GENERAL
PAINLEVEL_OUTOF10: 4
PAINLEVEL_OUTOF10: 2
PAINLEVEL_OUTOF10: 3
PAINLEVEL_OUTOF10: 0

## 2024-10-04 ASSESSMENT — PAIN DESCRIPTION - DESCRIPTORS
DESCRIPTORS: ACHING
DESCRIPTORS: ACHING

## 2024-10-04 ASSESSMENT — PAIN DESCRIPTION - LOCATION
LOCATION: HEAD
LOCATION: HEAD

## 2024-10-04 NOTE — PLAN OF CARE
Problem: ABCDS Injury Assessment  Goal: Absence of physical injury  10/3/2024 2158 by Jaimie Ku RN  Outcome: Progressing  10/3/2024 1238 by Sarah Clark RN  Outcome: Progressing     Problem: Discharge Planning  Goal: Discharge to home or other facility with appropriate resources  10/3/2024 2158 by Jaimie Ku RN  Outcome: Progressing  10/3/2024 1238 by Sarah Clark RN  Outcome: Progressing  Flowsheets (Taken 10/3/2024 0930)  Discharge to home or other facility with appropriate resources:   Identify barriers to discharge with patient and caregiver   Identify discharge learning needs (meds, wound care, etc)     Problem: Chronic Conditions and Co-morbidities  Goal: Patient's chronic conditions and co-morbidity symptoms are monitored and maintained or improved  10/3/2024 2158 by Jaimie Ku RN  Outcome: Progressing  10/3/2024 1238 by Sarah Clark RN  Outcome: Progressing  Flowsheets (Taken 10/3/2024 0930)  Care Plan - Patient's Chronic Conditions and Co-Morbidity Symptoms are Monitored and Maintained or Improved:   Monitor and assess patient's chronic conditions and comorbid symptoms for stability, deterioration, or improvement   Collaborate with multidisciplinary team to address chronic and comorbid conditions and prevent exacerbation or deterioration     Problem: Safety - Adult  Goal: Free from fall injury  Outcome: Progressing     Problem: Pain  Goal: Verbalizes/displays adequate comfort level or baseline comfort level  Outcome: Progressing

## 2024-10-04 NOTE — PROGRESS NOTES
Patient seen doing okay. Rising bun and creat with bid bumex. Still has edema present. Cxr no significant chf. Work up in progress for nephrotic syndrome. Vss afebrile, no white count elevation. Continue to monitor

## 2024-10-04 NOTE — PROGRESS NOTES
PROGRESS NOTE       PATIENT PROBLEM LIST:  Patient Active Problem List   Diagnosis Code    Rhabdomyolysis M62.82    Delirium due to another medical condition F05    Diabetic foot infection ([facility]) E11.628, L08.9    Osteomyelitis of great toe of left foot M86.9    DKA, type 1, not at goal ([facility]) E10.10    Diabetic acidosis without coma ([facility]) E11.10    New onset of congestive heart failure ([facility]) I50.9       SUBJECTIVE:  Santosh Thomas states He has now been placed on a secondary diuretic with the hope of losing more fluid in his lower extremities with fully realizes that he may require dialysis which she does not wish to undergo.  He remains in sinus rhythm presently.    REVIEW OF SYSTEMS:  General ROS: negative for - fatigue, malaise,  Psychological ROS: positive for - anxiety.  Ophthalmic ROS: negative for - decreased vision or visual distortion   ENT ROS: negative  Allergy and Immunology ROS: negative  Hematological and Lymphatic ROS: negative  Endocrine: no heat or cold intolerance and no polyphagia, polydipsia, or polyuria  Respiratory ROS: positive for - cough and shortness of breath  Cardiovascular ROS: positive for - edema and irregular heartbeat.  Gastrointestinal ROS: no abdominal pain, change in bowel habits, or black or bloody stools  Genito-Urinary ROS: no nocturia, dysuria, trouble voiding, frequency or hematuria  Musculoskeletal ROS: negative for- myalgias, arthralgias, or claudication  Neurological ROS: no TIA or stroke symptoms otherwise no significant change in symptoms or problems since yesterday as documented in previous progress notes.    SCHEDULED MEDICATIONS:   amLODIPine  5 mg Oral Daily    metOLazone  2.5 mg Oral Once    sacubitril-valsartan  0.5 tablet Oral Daily    ferric gluconate (FERRLECIT) 125 mg in sodium chloride 0.9 % 100 mL IVPB  125 mg IntraVENous Daily    bumetanide  2 mg IntraVENous BID    calcitRIOL  0.25 mcg Oral Once per day on Monday Wednesday Friday    allopurinol  100 mg Oral  Daily    nitroglycerin  1 inch Topical 3 times per day    aspirin  81 mg Oral QAM    vitamin D  2,000 Units Oral Daily    cloNIDine  0.2 mg Oral BID    diazePAM  5 mg Oral BID    [Held by provider] fenofibrate  160 mg Oral Daily    hydrALAZINE  50 mg Oral 3 times per day    hydrOXYzine pamoate  25 mg Oral Nightly    magnesium oxide  400 mg Oral BID    metoprolol succinate  25 mg Oral BID    pantoprazole  40 mg Oral BID AC    rosuvastatin  40 mg Oral Nightly    sertraline  50 mg Oral QAM    insulin glargine  30 Units SubCUTAneous Nightly    insulin lispro  0-4 Units SubCUTAneous TID WC    insulin lispro  0-4 Units SubCUTAneous Nightly       VITAL SIGNS:                                                                                                                          BP (!) 162/75   Pulse 86   Temp 98.9 °F (37.2 °C) (Oral)   Resp 18   Ht 1.676 m (5' 6\")   Wt 89.7 kg (197 lb 12.8 oz)   SpO2 95%   BMI 31.93 kg/m²   Patient Vitals for the past 96 hrs (Last 3 readings):   Weight   10/04/24 0332 89.7 kg (197 lb 12.8 oz)   10/03/24 0342 88.3 kg (194 lb 9.6 oz)   09/30/24 1352 85.3 kg (188 lb)     OBJECTIVE:    HEENT: PERRL, EOM  Intact; sclera non-icteric, conjunctiva pink. Carotids are brisk in upstroke with normal contour. No carotid bruits. Normal jugular venous pulsation at 45°. No palpable cervical nor supraclavicular nodes.Thyroid not palpable. Trachea midline.  Chest: Even excursion  Lungs: CTA B, no expiratory wheezes or rhonchi, no decreased tactile fremitus without inspiratory rales.  Heart: Regular  rhythm; S1 > S2, no gallop or murmur. No clicks, rub, palpable thrills   or heaves. PMI nondisplaced, 5th intercostal space MCL.   Abdomen: Soft, nontender, nondistended,  moderately protuberant, no masses or organomegaly.  Bowel sounds active.  Extremities: Without clubbing, cyanosis 2+ bilateral pretibial edema. Pulses present 3+ upper extermities bilaterally; barely palpable DP  bilaterally and barely  decreased left ventricular systolic function.    I have spent more than 25 minutes face to face with Santosh Thomas and reviewing notes and laboratory data, with greater than 50% of this time instructing and counseling the patient  face to face regarding my findings and recommendations and I have answered all questions as posed to me by Mr. Thomas.    Rancho Boone, DO FACP,FACC,[facility] [place], [place]      NOTE:  This report was transcribed using voice recognition software.  Every effort was made to ensure accuracy; however, inadvertent computerized transcription errors may be present

## 2024-10-04 NOTE — PROGRESS NOTES
Occupational Therapy  OT BEDSIDE TREATMENT NOTE      Date:10/4/2024  Patient Name: Santosh Thomas  MRN: [id number]  : 1950  Room: [address] [place] [address]        Evaluating OT: Dee Dee Gonzalez, OTR/SARAH - [id number]     Referring Provider: Devaughn Reed DO  Specific Provider Orders/Date: \"OT eval and treat\" - 10/1/2024     Diagnosis: Hypomagnesemia [E83.42]  New onset of congestive heart failure (HCC) [I50.9]  Acute renal failure superimposed on chronic kidney disease, unspecified acute renal failure type, unspecified CKD stage (HCC) [N17.9, N18.9]      Pertinent Medical History: dementia, depression, DM, HTN, neuropathy      Precautions: fall risk     Assessment of Current Deficits:    [x] Functional mobility             [x] ADLs          [x] Strength                  [x] Cognition   [x] Functional transfers           [x] IADLs         [x] Safety Awareness   [x] Endurance   [] Fine Motor Coordination    [x] Balance      [] Vision/Perception   [x] Sensation    [] Gross Motor Coordination [] ROM          [] Delirium                  [] Motor Control      OT PLAN OF CARE   OT POC is based on physician orders, patient diagnosis, and results of clinical assessment.  Frequency/Duration 2-5 days/week for 2-4 weeks PRN   Specific OT Treatment Interventions to Include:   * Instruction/training on adapted ADL techniques and AE recommendations to increase functional independence within precautions       * Training on energy conservation strategies, correct breathing pattern and techniques to improve independence/tolerance for self-care routine  * Functional transfer/mobility training/DME recommendations for increased independence, safety, and fall prevention  * Patient/Family education to increase follow through with safety techniques and functional independence  * Recommendation of environmental modifications for increased safety with functional transfers/mobility and ADLs  * Cognitive retraining/development of therapeutic  this session.   Mod I / Independent   Bed Mobility  Supine-to-Sit: SBA      Independent in order to maximize patient's independence/participation with ADLs, re-positioning, and other functional tasks.   Functional Transfers Sit-to-Stand: SBA   from EOB   CGA to stand from bed surface.   CGA from chair.     Independent   Functional Mobility SBA (with walker) within patient's room and hallway. CGA using w/w initially however pt fatigues quickly and min A to return to the chair.      Mod I / Independent with functional mobility (with device, as needed/appropriate) in order to maximize independence with ADLs/IADLs and other functional tasks.   Balance Sitting: Good (at EOB)  Standing: Fair to Fair+ (with walker)   Fair+ to Good- dynamic standing balance during completion of ADLs/IADLs and other functional tasks.   Activity Tolerance Fair+  poor+ stand tolerance.  Pt with SOB and fatigue with limited mobility.  O2 saturation 94%.  Patient will demonstrate Good understanding and consistent implementation of energy conservation techniques and work simplification techniques into ADL/IADL routines.   Visual/  Perceptual WFL      N/A   B UE Strength, ROM B UE ROM: WFL  B UE Strength: 4/5 grossly Instructed with AROM while seated in the chair.  Good demonstration noted.   Patient will demonstrate 5/5 B UE strength in order to maximize independence with ADLs/IADLs and functional transfers.     Comments: Pt sitting in the chair.  Visitor present and encouraged pt to participate in therapy. Agreeed to therapy.  Pt reports he is feeling frustrated and having increased difficulty to complete functional activity.  He does have edema and increased assistance with LB ADL.  SOB and fatigue with mobility today.  Returned to chair.   Pt remained seated in the chair with alarm activated.     Education/treatment:  ADL retraining with facilitation of movement to increase self care skills. Therapeutic activity to address balance and  endurance for ADL and transfers.  Pt education of walker safety, transfer safety, energy conservation.       Pt has made  progress towards set goals.       Time In: 120  Time Out: 130     Min Units   Therapeutic Ex [id number]     Therapeutic Activities 04227 5    ADL/Self Care 94309 5 1   Orthotic Management [id number]     Neuro Re-Ed [id number]     Non-Billable Time     TOTAL TIMED TREATMENT 10 1         Johanny HERBERT/L [id number]

## 2024-10-04 NOTE — CARE COORDINATION
Nephrology and Palliative are following. Plan for nephrotic syndrome workup. Cr 4.5  Serologies pending, awaiting 24 hr urine collection. IV bumex increased to bid. AM-PAC 17 Patient declined HHC. Discharge plan will be home when medically stable. Will continue to follow.  Brooke KUMARIN, RN  Case Management

## 2024-10-04 NOTE — ACP (ADVANCE CARE PLANNING)
Advance Care Planning     Palliative Team Advance Care Planning (ACP) Conversation    Date of Conversation: 10/04/24    Individuals present for the conversation: Legal healthcare agent named below     ACP documents on file prior to discussion:  -Other HCPOA completed in 2018 on file, it appoints his son Santosh jon as agent but pt states he has changed this to his Latonia and Lou.    Previously completed document/s not on file:  new HCPOA document was completed previously but it is not available for review. This writer requested a copy of the document for patient's chart.     Healthcare Decision Maker:    Primary Decision Maker: Lou Tejada CALL FIRST - Brother/Sister - [phone]    Secondary Decision Maker: Latonia Thomas - Ex-Spouse - [phone]     Conversation Summary: LSW spoke with pts ex wife Latonia who confirms pt did a new HCPOA appointing herself and Lou as agents. Latonia will provide a copy for chart.     Resuscitation Status:   Code Status: DNR-CCA     Documentation Completed:  -Patient/surrogate was asked to submit existing ACP documents for our records    I spent 16 minutes with the patient and/or surrogate decision maker discussing the patient's wishes and goals.      CRISTI Lozada

## 2024-10-04 NOTE — PLAN OF CARE
Problem: Chronic Conditions and Co-morbidities  Goal: Patient's chronic conditions and co-morbidity symptoms are monitored and maintained or improved  Outcome: Progressing  Flowsheets (Taken 10/4/2024 7102)  Care Plan - Patient's Chronic Conditions and Co-Morbidity Symptoms are Monitored and Maintained or Improved:   Monitor and assess patient's chronic conditions and comorbid symptoms for stability, deterioration, or improvement   Collaborate with multidisciplinary team to address chronic and comorbid conditions and prevent exacerbation or deterioration     Problem: Safety - Adult  Goal: Free from fall injury  Outcome: Progressing     Problem: Pain  Goal: Verbalizes/displays adequate comfort level or baseline comfort level  Outcome: Progressing

## 2024-10-04 NOTE — PROGRESS NOTES
[facility]  Nephrology Progress Note  Patient's Name: Santosh Thomas  12:12 PM  10/4/2024    Nephrologist: seen twice inpatient by [facility] in 2018 with no subsequent outpatient follow up    Reason for Consult:  GENIA  Requesting Physician:  Devaughn Reed DO    Chief Complaint:  weakness    History Obtained From:  patient, chart    History of Present Illness:    Santosh Thomas is a 73 y.o. male with past medical history of longstanding type 2 diabetes now on insulin therapy, hypertension, diabetic complications including neuropathy and what sounds to be retinopathy as he follows Dr. Vazquez at [facility] and has required injections in the past, toe amputation of the left foot.  He also has history of hypertriglyce so with podiatry.  Ridemia good resolved on fenofibrate presently.  He also has some history of chronic kidney disease, likely stage IIIb based off recent labs that are available to review in the chart.  He has had multiple acute kidney injuries, our practice is seen him on 2 occurrences in 2018, once was felt to be related to rhabdomyolysis.  He did have recovery with IV fluids.  He had also had a history of NSAID use at that time.  He did not have any proteinuria at that time.  He was seen on a separate occasion in 2018 when serum creatinine peaked at 4.0 mg/dL on presentation though quickly improved to a baseline of 1.2 mg/dL.  We have not seen him since.  He has had a baseline serum creatinine of what appears to be 1.3 up to around 2.0 mg/dL over the past 1-1/2 years.  He has not seen nephrology.  Of concern, a urinalysis in January 2023 which showed a significant increase of proteinuria.    Patient presented to the hospital yesterday after being advised by his primary care physician to present.  The patient gives a history of having increased leg swelling, worsening of exertional dyspnea, weakness, fatigue that has worsened rather drastically over the past couple of weeks.  The patient  (L) 10/03/2024    CALCIUM 7.7 (L) 10/02/2024    CAION 1.22 01/15/2018    CAION 1.10 (L) 01/14/2018    PHOS 5.3 (H) 10/04/2024    PHOS 5.1 (H) 10/03/2024    PHOS 2.8 01/27/2023    MG 1.3 (L) 09/30/2024    MG 1.7 01/27/2023    MG 1.8 01/27/2023       BMP:   Recent Labs     10/02/24  0838 10/03/24  0347 10/04/24  0331    138 138   K 4.1 4.2 4.5   * 110* 108*   CO2 18* 21* 20*   BUN 41* 44* 47*   CREATININE 4.0* 4.2* 4.5*   GLUCOSE 166* 92 94             Labs:  CBC with Differential:    Lab Results   Component Value Date/Time    WBC 6.3 10/04/2024 03:31 AM    RBC 3.13 10/04/2024 03:31 AM    HGB 9.1 10/04/2024 03:31 AM    HCT 29.2 10/04/2024 03:31 AM     10/04/2024 03:31 AM    MCV 93.3 10/04/2024 03:31 AM    MCH 29.1 10/04/2024 03:31 AM    MCHC 31.2 10/04/2024 03:31 AM    RDW 15.9 10/04/2024 03:31 AM    METASPCT 1 10/04/2024 03:31 AM    LYMPHOPCT 11 10/04/2024 03:31 AM    MONOPCT 8 10/04/2024 03:31 AM    EOSPCT 0 10/04/2024 03:31 AM    BASOPCT 2 10/04/2024 03:31 AM    MONOSABS 0.49 10/04/2024 03:31 AM    LYMPHSABS 0.71 10/04/2024 03:31 AM    EOSABS 0.00 10/04/2024 03:31 AM    BASOSABS 0.11 10/04/2024 03:31 AM     BMP:    Lab Results   Component Value Date/Time     10/04/2024 03:31 AM    K 4.5 10/04/2024 03:31 AM    K 3.5 01/26/2023 12:33 PM     10/04/2024 03:31 AM    CO2 20 10/04/2024 03:31 AM    BUN 47 10/04/2024 03:31 AM    CREATININE 4.5 10/04/2024 03:31 AM    CALCIUM 8.3 10/04/2024 03:31 AM    GFRAA >60 03/29/2021 12:20 PM    LABGLOM 13 10/04/2024 03:31 AM    LABGLOM 37 09/16/2023 01:52 PM    GLUCOSE 94 10/04/2024 03:31 AM     Albumin:  No results found for: \"LABALBU\"  Ionized Calcium:  No components found for: \"IONCA\"  Magnesium:    Lab Results   Component Value Date/Time    MG 1.3 09/30/2024 03:34 PM     Phosphorus:    Lab Results   Component Value Date/Time    PHOS 5.3 10/04/2024 03:31 AM     LDH:  No results found for: \"LDH\"  Uric Acid:    Lab Results   Component Value Date/Time

## 2024-10-05 LAB
ALBUMIN SERPL-MCNC: 2.7 G/DL (ref 3.5–5.2)
ALP SERPL-CCNC: 85 U/L (ref 40–129)
ALT SERPL-CCNC: 14 U/L (ref 0–40)
ANION GAP SERPL CALCULATED.3IONS-SCNC: 11 MMOL/L (ref 7–16)
AST SERPL-CCNC: 15 U/L (ref 0–39)
BILIRUB SERPL-MCNC: 0.2 MG/DL (ref 0–1.2)
BUN SERPL-MCNC: 50 MG/DL (ref 6–23)
CALCIUM SERPL-MCNC: 8.2 MG/DL (ref 8.6–10.2)
CHLORIDE SERPL-SCNC: 105 MMOL/L (ref 98–107)
CO2 SERPL-SCNC: 20 MMOL/L (ref 22–29)
COLLECT DURATION TIME SPEC: 24 H
CREAT SERPL-MCNC: 4.7 MG/DL (ref 0.7–1.2)
CREATINE 24H UR-MRATE: 753 MG/24 H (ref 980–2200)
GFR, ESTIMATED: 13 ML/MIN/1.73M2
GLUCOSE BLD-MCNC: 107 MG/DL (ref 74–99)
GLUCOSE BLD-MCNC: 112 MG/DL (ref 74–99)
GLUCOSE BLD-MCNC: 84 MG/DL (ref 74–99)
GLUCOSE BLD-MCNC: 88 MG/DL (ref 74–99)
GLUCOSE SERPL-MCNC: 37 MG/DL (ref 74–99)
HOURS COLLECTED: 24 H
POTASSIUM SERPL-SCNC: 3.9 MMOL/L (ref 3.5–5)
PROT SERPL-MCNC: 5.3 G/DL (ref 6.4–8.3)
PROTEIN 24 HOUR URINE: 2987 MG/24 H (ref 0–150)
SODIUM SERPL-SCNC: 136 MMOL/L (ref 132–146)
SPECIMEN VOL UR: 900 ML
VOLUME: 900 ML

## 2024-10-05 PROCEDURE — 6370000000 HC RX 637 (ALT 250 FOR IP): Performed by: STUDENT IN AN ORGANIZED HEALTH CARE EDUCATION/TRAINING PROGRAM

## 2024-10-05 PROCEDURE — 2060000000 HC ICU INTERMEDIATE R&B

## 2024-10-05 PROCEDURE — 80053 COMPREHEN METABOLIC PANEL: CPT

## 2024-10-05 PROCEDURE — 82962 GLUCOSE BLOOD TEST: CPT

## 2024-10-05 PROCEDURE — 6360000002 HC RX W HCPCS: Performed by: STUDENT IN AN ORGANIZED HEALTH CARE EDUCATION/TRAINING PROGRAM

## 2024-10-05 PROCEDURE — 6370000000 HC RX 637 (ALT 250 FOR IP): Performed by: INTERNAL MEDICINE

## 2024-10-05 PROCEDURE — 2580000003 HC RX 258: Performed by: STUDENT IN AN ORGANIZED HEALTH CARE EDUCATION/TRAINING PROGRAM

## 2024-10-05 PROCEDURE — 6370000000 HC RX 637 (ALT 250 FOR IP): Performed by: GENERAL PRACTICE

## 2024-10-05 RX ORDER — CLONIDINE HYDROCHLORIDE 0.1 MG/1
0.1 TABLET ORAL ONCE
Status: COMPLETED | OUTPATIENT
Start: 2024-10-05 | End: 2024-10-05

## 2024-10-05 RX ADMIN — METOPROLOL SUCCINATE 25 MG: 25 TABLET, EXTENDED RELEASE ORAL at 09:35

## 2024-10-05 RX ADMIN — NITROGLYCERIN 1 INCH: 20 OINTMENT TOPICAL at 20:54

## 2024-10-05 RX ADMIN — METOPROLOL SUCCINATE 25 MG: 25 TABLET, EXTENDED RELEASE ORAL at 20:44

## 2024-10-05 RX ADMIN — INSULIN GLARGINE 30 UNITS: 100 INJECTION, SOLUTION SUBCUTANEOUS at 20:51

## 2024-10-05 RX ADMIN — HYDROXYZINE PAMOATE 25 MG: 25 CAPSULE ORAL at 20:44

## 2024-10-05 RX ADMIN — SODIUM CHLORIDE 125 MG: 900 INJECTION INTRAVENOUS at 09:40

## 2024-10-05 RX ADMIN — AMLODIPINE BESYLATE 5 MG: 5 TABLET ORAL at 09:34

## 2024-10-05 RX ADMIN — NITROGLYCERIN 1 INCH: 20 OINTMENT TOPICAL at 15:07

## 2024-10-05 RX ADMIN — Medication 16 G: at 06:16

## 2024-10-05 RX ADMIN — MAGNESIUM OXIDE 400 MG (241.3 MG MAGNESIUM) TABLET 400 MG: TABLET at 09:33

## 2024-10-05 RX ADMIN — CLONIDINE HYDROCHLORIDE 0.1 MG: 0.1 TABLET ORAL at 15:07

## 2024-10-05 RX ADMIN — SACUBITRIL AND VALSARTAN 0.5 TABLET: 24; 26 TABLET, FILM COATED ORAL at 09:34

## 2024-10-05 RX ADMIN — ASPIRIN 81 MG: 81 TABLET, COATED ORAL at 09:33

## 2024-10-05 RX ADMIN — ALLOPURINOL 100 MG: 100 TABLET ORAL at 09:34

## 2024-10-05 RX ADMIN — NITROGLYCERIN 1 INCH: 20 OINTMENT TOPICAL at 07:08

## 2024-10-05 RX ADMIN — BUMETANIDE 2 MG: 0.25 INJECTION INTRAMUSCULAR; INTRAVENOUS at 09:35

## 2024-10-05 RX ADMIN — CLONIDINE HYDROCHLORIDE 0.2 MG: 0.2 TABLET ORAL at 09:34

## 2024-10-05 RX ADMIN — PANTOPRAZOLE SODIUM 40 MG: 40 TABLET, DELAYED RELEASE ORAL at 07:08

## 2024-10-05 RX ADMIN — SERTRALINE HYDROCHLORIDE 50 MG: 50 TABLET ORAL at 09:34

## 2024-10-05 RX ADMIN — ROSUVASTATIN CALCIUM 40 MG: 20 TABLET, FILM COATED ORAL at 20:44

## 2024-10-05 RX ADMIN — DIAZEPAM 5 MG: 5 TABLET ORAL at 20:49

## 2024-10-05 RX ADMIN — Medication 2000 UNITS: at 09:34

## 2024-10-05 RX ADMIN — DIAZEPAM 5 MG: 5 TABLET ORAL at 09:33

## 2024-10-05 RX ADMIN — PANTOPRAZOLE SODIUM 40 MG: 40 TABLET, DELAYED RELEASE ORAL at 16:34

## 2024-10-05 RX ADMIN — BUMETANIDE 2 MG: 0.25 INJECTION INTRAMUSCULAR; INTRAVENOUS at 20:45

## 2024-10-05 RX ADMIN — HYDRALAZINE HYDROCHLORIDE 50 MG: 50 TABLET ORAL at 07:08

## 2024-10-05 RX ADMIN — MAGNESIUM OXIDE 400 MG (241.3 MG MAGNESIUM) TABLET 400 MG: TABLET at 20:43

## 2024-10-05 ASSESSMENT — PAIN SCALES - GENERAL
PAINLEVEL_OUTOF10: 1
PAINLEVEL_OUTOF10: 0

## 2024-10-05 NOTE — PROGRESS NOTES
went to see patient in response to request by family member. Patient was sleeping.   prayed a silent prayer for the patient and left devotional material and information about  services.  Chaplains will remain available to offer spiritual and emotional support as needed.

## 2024-10-05 NOTE — PLAN OF CARE
Problem: ABCDS Injury Assessment  Goal: Absence of physical injury  Outcome: Progressing     Problem: Discharge Planning  Goal: Discharge to home or other facility with appropriate resources  Outcome: Progressing  Flowsheets (Taken 10/4/2024 0930 by Sarah Clark RN)  Discharge to home or other facility with appropriate resources:   Identify barriers to discharge with patient and caregiver   Identify discharge learning needs (meds, wound care, etc)     Problem: Chronic Conditions and Co-morbidities  Goal: Patient's chronic conditions and co-morbidity symptoms are monitored and maintained or improved  10/4/2024 2009 by Carl Tatum RN  Outcome: Progressing  10/4/2024 1559 by Sarah Clark RN  Outcome: Progressing  Flowsheets (Taken 10/4/2024 0930)  Care Plan - Patient's Chronic Conditions and Co-Morbidity Symptoms are Monitored and Maintained or Improved:   Monitor and assess patient's chronic conditions and comorbid symptoms for stability, deterioration, or improvement   Collaborate with multidisciplinary team to address chronic and comorbid conditions and prevent exacerbation or deterioration     Problem: Safety - Adult  Goal: Free from fall injury  10/4/2024 2009 by Carl Tatum RN  Outcome: Progressing  10/4/2024 1559 by Sarah Clark RN  Outcome: Progressing     Problem: Pain  Goal: Verbalizes/displays adequate comfort level or baseline comfort level  10/4/2024 2009 by Carl Tatum RN  Outcome: Progressing  10/4/2024 1559 by Sarah Clark RN  Outcome: Progressing     Problem: Skin/Tissue Integrity  Goal: Absence of new skin breakdown  Description: 1.  Monitor for areas of redness and/or skin breakdown  2.  Assess vascular access sites hourly  3.  Every 4-6 hours minimum:  Change oxygen saturation probe site  4.  Every 4-6 hours:  If on nasal continuous positive airway pressure, respiratory therapy assess nares and determine need for appliance change or resting  Medication sent to the pharmacy would recommend recheck in 2-3 months  Medication can cause constipation- so if this occurs consider stool softner

## 2024-10-05 NOTE — PROGRESS NOTES
PROGRESS NOTE       PATIENT PROBLEM LIST:  Patient Active Problem List   Diagnosis Code    Rhabdomyolysis M62.82    Delirium due to another medical condition F05    Diabetic foot infection ([facility]) E11.628, L08.9    Osteomyelitis of great toe of left foot M86.9    DKA, type 1, not at goal ([facility]) E10.10    Diabetic acidosis without coma ([facility]) E11.10    New onset of congestive heart failure ([facility]) I50.9       SUBJECTIVE:  Santosh Thomas notes that he is having discomfort in his sacrococcygeal area sitting in certain positions.  He denies any shortness of breath chest discomfort but did experience profound hypoglycemia and systolic blood pressure now decreased.  REVIEW OF SYSTEMS:  General ROS: negative for - fatigue, malaise,  Psychological ROS: positive for - anxiety.  Ophthalmic ROS: negative for - decreased vision or visual distortion   ENT ROS: negative  Allergy and Immunology ROS: negative  Hematological and Lymphatic ROS: negative  Endocrine: no heat or cold intolerance and no polyphagia, polydipsia, or polyuria  Respiratory ROS: positive for - cough and shortness of breath  Cardiovascular ROS: positive for - edema and irregular heartbeat.  Gastrointestinal ROS: no abdominal pain, change in bowel habits, or black or bloody stools  Genito-Urinary ROS: no nocturia, dysuria, trouble voiding, frequency or hematuria  Musculoskeletal ROS: negative for- myalgias, arthralgias, or claudication  Neurological ROS: no TIA or stroke symptoms otherwise no significant change in symptoms or problems since yesterday as documented in previous progress notes.    SCHEDULED MEDICATIONS:   amLODIPine  5 mg Oral Daily    sacubitril-valsartan  0.5 tablet Oral Daily    ferric gluconate (FERRLECIT) 125 mg in sodium chloride 0.9 % 100 mL IVPB  125 mg IntraVENous Daily    bumetanide  2 mg IntraVENous BID    calcitRIOL  0.25 mcg Oral Once per day on Monday Wednesday Friday    allopurinol  100 mg Oral Daily    nitroglycerin  1 inch Topical 3  times per day    aspirin  81 mg Oral QAM    vitamin D  2,000 Units Oral Daily    cloNIDine  0.2 mg Oral BID    diazePAM  5 mg Oral BID    [Held by provider] fenofibrate  160 mg Oral Daily    hydrALAZINE  50 mg Oral 3 times per day    hydrOXYzine pamoate  25 mg Oral Nightly    magnesium oxide  400 mg Oral BID    metoprolol succinate  25 mg Oral BID    pantoprazole  40 mg Oral BID AC    rosuvastatin  40 mg Oral Nightly    sertraline  50 mg Oral QAM    insulin glargine  30 Units SubCUTAneous Nightly    insulin lispro  0-4 Units SubCUTAneous TID WC    insulin lispro  0-4 Units SubCUTAneous Nightly       VITAL SIGNS:                                                                                                                          BP (!) 98/56   Pulse 53   Temp 97.6 °F (36.4 °C) (Oral)   Resp 17   Ht 1.676 m (5' 6\")   Wt 89.7 kg (197 lb 12.8 oz)   SpO2 97%   BMI 31.93 kg/m²   Patient Vitals for the past 96 hrs (Last 3 readings):   Weight   10/04/24 0332 89.7 kg (197 lb 12.8 oz)   10/03/24 0342 88.3 kg (194 lb 9.6 oz)     OBJECTIVE:    HEENT: PERRL, EOM  Intact; sclera non-icteric, conjunctiva pink. Carotids are brisk in upstroke with normal contour. No carotid bruits. Normal jugular venous pulsation at 45°. No palpable cervical nor supraclavicular nodes.Thyroid not palpable. Trachea midline.  Chest: Even excursion  Lungs: CTA B, no expiratory wheezes or rhonchi, no decreased tactile fremitus without inspiratory rales.  Heart: Regular  rhythm; S1 > S2, no gallop or murmur. No clicks, rub, palpable thrills   or heaves. PMI nondisplaced, 5th intercostal space MCL.   Abdomen: Soft, nontender, nondistended,  moderately protuberant, no masses or organomegaly.  Bowel sounds active.  Extremities: Without clubbing, cyanosis 2+ bilateral pretibial edema. Pulses present 3+ upper extermities bilaterally; barely palpable DP  bilaterally and barely palpable PT bilaterally.     Data:   Scheduled Meds: Reviewed  Continuous

## 2024-10-05 NOTE — PROGRESS NOTES
UPEP with serum free light chains     6-Nutrition  -Albumin low at 2.9 g/dL  may be reflective of underlying nephrotic syndrome     7. HRrEF  On iv bumex q 12/metolazone  Follow uo  Uo not being recorded  Started entresto  On bb and hydralazine as well, nitro          Irwin Antoine MD  12:01 PM  10/5/2024

## 2024-10-06 LAB
ALBUMIN SERPL-MCNC: 2.4 G/DL (ref 3.5–5.2)
ALP SERPL-CCNC: 82 U/L (ref 40–129)
ALT SERPL-CCNC: 11 U/L (ref 0–40)
ANION GAP SERPL CALCULATED.3IONS-SCNC: 9 MMOL/L (ref 7–16)
AST SERPL-CCNC: 15 U/L (ref 0–39)
BILIRUB SERPL-MCNC: <0.2 MG/DL (ref 0–1.2)
BUN SERPL-MCNC: 52 MG/DL (ref 6–23)
CALCIUM SERPL-MCNC: 7.8 MG/DL (ref 8.6–10.2)
CHLORIDE SERPL-SCNC: 106 MMOL/L (ref 98–107)
CO2 SERPL-SCNC: 21 MMOL/L (ref 22–29)
CREAT SERPL-MCNC: 4.9 MG/DL (ref 0.7–1.2)
GFR, ESTIMATED: 12 ML/MIN/1.73M2
GLUCOSE BLD-MCNC: 108 MG/DL (ref 74–99)
GLUCOSE BLD-MCNC: 150 MG/DL (ref 74–99)
GLUCOSE BLD-MCNC: 159 MG/DL (ref 74–99)
GLUCOSE BLD-MCNC: 203 MG/DL (ref 74–99)
GLUCOSE BLD-MCNC: 309 MG/DL (ref 74–99)
GLUCOSE BLD-MCNC: 47 MG/DL (ref 74–99)
GLUCOSE SERPL-MCNC: 42 MG/DL (ref 74–99)
PLA2R IGG SERPL QL IF: NORMAL
POTASSIUM SERPL-SCNC: 4.3 MMOL/L (ref 3.5–5)
PROT SERPL-MCNC: 4.7 G/DL (ref 6.4–8.3)
SODIUM SERPL-SCNC: 136 MMOL/L (ref 132–146)

## 2024-10-06 PROCEDURE — 2060000000 HC ICU INTERMEDIATE R&B

## 2024-10-06 PROCEDURE — 6370000000 HC RX 637 (ALT 250 FOR IP): Performed by: INTERNAL MEDICINE

## 2024-10-06 PROCEDURE — 2580000003 HC RX 258: Performed by: GENERAL PRACTICE

## 2024-10-06 PROCEDURE — 6360000002 HC RX W HCPCS: Performed by: STUDENT IN AN ORGANIZED HEALTH CARE EDUCATION/TRAINING PROGRAM

## 2024-10-06 PROCEDURE — 80053 COMPREHEN METABOLIC PANEL: CPT

## 2024-10-06 PROCEDURE — 6370000000 HC RX 637 (ALT 250 FOR IP): Performed by: GENERAL PRACTICE

## 2024-10-06 PROCEDURE — 82962 GLUCOSE BLOOD TEST: CPT

## 2024-10-06 PROCEDURE — 6370000000 HC RX 637 (ALT 250 FOR IP): Performed by: STUDENT IN AN ORGANIZED HEALTH CARE EDUCATION/TRAINING PROGRAM

## 2024-10-06 PROCEDURE — 2580000003 HC RX 258: Performed by: STUDENT IN AN ORGANIZED HEALTH CARE EDUCATION/TRAINING PROGRAM

## 2024-10-06 RX ADMIN — DIAZEPAM 5 MG: 5 TABLET ORAL at 21:41

## 2024-10-06 RX ADMIN — SACUBITRIL AND VALSARTAN 0.5 TABLET: 24; 26 TABLET, FILM COATED ORAL at 09:22

## 2024-10-06 RX ADMIN — NITROGLYCERIN 1 INCH: 20 OINTMENT TOPICAL at 06:14

## 2024-10-06 RX ADMIN — Medication 2000 UNITS: at 09:22

## 2024-10-06 RX ADMIN — MAGNESIUM OXIDE 400 MG (241.3 MG MAGNESIUM) TABLET 400 MG: TABLET at 21:42

## 2024-10-06 RX ADMIN — PANTOPRAZOLE SODIUM 40 MG: 40 TABLET, DELAYED RELEASE ORAL at 14:23

## 2024-10-06 RX ADMIN — SODIUM CHLORIDE 125 MG: 900 INJECTION INTRAVENOUS at 09:41

## 2024-10-06 RX ADMIN — DEXTROSE MONOHYDRATE 125 ML: 100 INJECTION, SOLUTION INTRAVENOUS at 04:17

## 2024-10-06 RX ADMIN — HYDRALAZINE HYDROCHLORIDE 50 MG: 50 TABLET ORAL at 21:45

## 2024-10-06 RX ADMIN — DIAZEPAM 5 MG: 5 TABLET ORAL at 09:23

## 2024-10-06 RX ADMIN — INSULIN LISPRO 1 UNITS: 100 INJECTION, SOLUTION INTRAVENOUS; SUBCUTANEOUS at 06:14

## 2024-10-06 RX ADMIN — NITROGLYCERIN 1 INCH: 20 OINTMENT TOPICAL at 14:23

## 2024-10-06 RX ADMIN — ASPIRIN 81 MG: 81 TABLET, COATED ORAL at 09:21

## 2024-10-06 RX ADMIN — BUMETANIDE 2 MG: 0.25 INJECTION INTRAMUSCULAR; INTRAVENOUS at 21:46

## 2024-10-06 RX ADMIN — BUMETANIDE 2 MG: 0.25 INJECTION INTRAMUSCULAR; INTRAVENOUS at 09:23

## 2024-10-06 RX ADMIN — SERTRALINE HYDROCHLORIDE 50 MG: 50 TABLET ORAL at 09:22

## 2024-10-06 RX ADMIN — METOPROLOL SUCCINATE 25 MG: 25 TABLET, EXTENDED RELEASE ORAL at 21:45

## 2024-10-06 RX ADMIN — ACETAMINOPHEN 650 MG: 325 TABLET ORAL at 21:41

## 2024-10-06 RX ADMIN — NITROGLYCERIN 1 INCH: 20 OINTMENT TOPICAL at 21:59

## 2024-10-06 RX ADMIN — HYDROXYZINE PAMOATE 25 MG: 25 CAPSULE ORAL at 21:41

## 2024-10-06 RX ADMIN — ALLOPURINOL 100 MG: 100 TABLET ORAL at 09:21

## 2024-10-06 RX ADMIN — METOPROLOL SUCCINATE 25 MG: 25 TABLET, EXTENDED RELEASE ORAL at 09:22

## 2024-10-06 RX ADMIN — MAGNESIUM OXIDE 400 MG (241.3 MG MAGNESIUM) TABLET 400 MG: TABLET at 09:22

## 2024-10-06 RX ADMIN — INSULIN GLARGINE 30 UNITS: 100 INJECTION, SOLUTION SUBCUTANEOUS at 21:53

## 2024-10-06 RX ADMIN — INSULIN LISPRO 3 UNITS: 100 INJECTION, SOLUTION INTRAVENOUS; SUBCUTANEOUS at 17:17

## 2024-10-06 RX ADMIN — ROSUVASTATIN CALCIUM 40 MG: 20 TABLET, FILM COATED ORAL at 21:42

## 2024-10-06 RX ADMIN — HYDRALAZINE HYDROCHLORIDE 50 MG: 50 TABLET ORAL at 06:10

## 2024-10-06 RX ADMIN — PANTOPRAZOLE SODIUM 40 MG: 40 TABLET, DELAYED RELEASE ORAL at 06:10

## 2024-10-06 RX ADMIN — HYDRALAZINE HYDROCHLORIDE 50 MG: 50 TABLET ORAL at 14:22

## 2024-10-06 ASSESSMENT — PAIN DESCRIPTION - LOCATION: LOCATION: LEG

## 2024-10-06 ASSESSMENT — PAIN SCALES - GENERAL
PAINLEVEL_OUTOF10: 0
PAINLEVEL_OUTOF10: 5

## 2024-10-06 ASSESSMENT — PAIN SCALES - WONG BAKER: WONGBAKER_NUMERICALRESPONSE: NO HURT

## 2024-10-06 ASSESSMENT — PAIN DESCRIPTION - ORIENTATION: ORIENTATION: RIGHT;LEFT;LOWER

## 2024-10-06 ASSESSMENT — PAIN DESCRIPTION - DESCRIPTORS: DESCRIPTORS: ACHING;DISCOMFORT;SORE

## 2024-10-06 ASSESSMENT — PAIN - FUNCTIONAL ASSESSMENT: PAIN_FUNCTIONAL_ASSESSMENT: ACTIVITIES ARE NOT PREVENTED

## 2024-10-06 NOTE — PROGRESS NOTES
PROGRESS NOTE       PATIENT PROBLEM LIST:  Patient Active Problem List   Diagnosis Code    Rhabdomyolysis M62.82    Delirium due to another medical condition F05    Diabetic foot infection ([facility]) E11.628, L08.9    Osteomyelitis of great toe of left foot M86.9    DKA, type 1, not at goal ([facility]) E10.10    Diabetic acidosis without coma ([facility]) E11.10    New onset of congestive heart failure ([facility]) I50.9       SUBJECTIVE:  Santosh Thomas notes that he is feeling about the same but denies any shortness of breath whatsoever and is attempting to exercise his lower extremities as he does not wish to be transferred to a nursing care facility.  Likewise his serum creatinine has increased as well to 4.9 mg/dL.  REVIEW OF SYSTEMS:  General ROS: positive for - fatigue.  Psychological ROS: positive for - anxiety.  Ophthalmic ROS: positive for - decreased vision and utilizes corrective lenses for visual acuity.    ENT ROS: negative  Allergy and Immunology ROS: negative  Hematological and Lymphatic ROS: negative  Endocrine: no heat or cold intolerance and no polyphagia, polydipsia, or polyuria  Respiratory ROS: positive for - cough and shortness of breath-improved  Cardiovascular ROS: positive for - edema and irregular heartbeat.  Gastrointestinal ROS: no abdominal pain, change in bowel habits, or black or bloody stools  Genito-Urinary ROS: no nocturia, dysuria, trouble voiding, frequency or hematuria  Musculoskeletal ROS: negative for- myalgias, arthralgias, or claudication  Neurological ROS: no TIA or stroke symptoms otherwise no significant change in symptoms or problems since yesterday as documented in previous progress notes.    SCHEDULED MEDICATIONS:   sacubitril-valsartan  0.5 tablet Oral Daily    ferric gluconate (FERRLECIT) 125 mg in sodium chloride 0.9 % 100 mL IVPB  125 mg IntraVENous Daily    bumetanide  2 mg IntraVENous BID    calcitRIOL  0.25 mcg Oral Once per day on Monday Wednesday Friday    allopurinol  100 mg Oral    Data:   Scheduled Meds: Reviewed  Continuous Infusions:    dextrose         Intake/Output Summary (Last 24 hours) at 10/6/2024 1317  Last data filed at 10/6/2024 1210  Gross per 24 hour   Intake --   Output 1295 ml   Net -1295 ml     CBC:   Recent Labs     10/04/24  0331   WBC 6.3   HGB 9.1*   HCT 29.2*        BMP:  Recent Labs     10/04/24  0331 10/05/24  0440 10/06/24  0302    136 136   K 4.5 3.9 4.3   * 105 106   CO2 20* 20* 21*   BUN 47* 50* 52*   CREATININE 4.5* 4.7* 4.9*   LABGLOM 13* 13* 12*     ABGs: No results found for: \"PH\", \"PO2\", \"PCO2\"  INR: No results for input(s): \"INR\" in the last 72 hours.  PRO-BNP:   Lab Results   Component Value Date    PROBNP 34,195 (H) 09/30/2024    PROBNP 302 (H) 09/07/2019      TSH:   Lab Results   Component Value Date    TSH 1.00 10/01/2024      Cardiac Injury Profile: No results for input(s): \"TROPHS\" in the last 72 hours.   Lipid Profile:   Lab Results   Component Value Date/Time    TRIG 117 10/01/2024 05:14 AM    HDL 41 10/01/2024 05:14 AM    CHOL 159 10/01/2024 05:14 AM      Hemoglobin A1C: No components found for: \"HGBA1C\"      RAD:   XR CHEST PORTABLE    Result Date: 10/3/2024  There is right middle lobe opacity concerning for pneumonia and or atelectasis.         EKG: See Report  Echo: See Report      IMPRESSIONS:  Patient Active Problem List   Diagnosis Code    Rhabdomyolysis M62.82    Delirium due to another medical condition F05    Diabetic foot infection ([facility]) E11.628, L08.9    Osteomyelitis of great toe of left foot M86.9    DKA, type 1, not at goal ([facility]) E10.10    Diabetic acidosis without coma ([facility]) E11.10    New onset of congestive heart failure ([facility]) I50.9       RECOMMENDATIONS:  Continue present medical regimen but unfortunately renal function continues to deteriorate however blood pressure seems stable with adjustment of blood pressure medicines.  I have spent more than 25 minutes face to face with Santosh Thomas and reviewing notes

## 2024-10-06 NOTE — PROGRESS NOTES
Assessed pt's BS was 47 at 0412. Pt Alert. Give prn med and offered cookies and juices. Will recheck BS after 15 min.

## 2024-10-06 NOTE — PLAN OF CARE
Problem: ABCDS Injury Assessment  Goal: Absence of physical injury  Outcome: Progressing     Problem: Chronic Conditions and Co-morbidities  Goal: Patient's chronic conditions and co-morbidity symptoms are monitored and maintained or improved  Outcome: Progressing     Problem: Safety - Adult  Goal: Free from fall injury  Outcome: Progressing     Problem: Pain  Goal: Verbalizes/displays adequate comfort level or baseline comfort level  Outcome: Progressing

## 2024-10-06 NOTE — PROGRESS NOTES
PHOS 5.3 10/04/2024 03:31 AM     LDH:  No results found for: \"LDH\"  Uric Acid:    Lab Results   Component Value Date/Time    URICACID 7.3 10/02/2024 06:20 AM     Troponin:    Lab Results   Component Value Date/Time    TROPONINI 0.07 01/10/2018 03:15 AM     U/A:    Lab Results   Component Value Date/Time    COLORU Yellow 10/01/2024 03:05 AM    PROTEINU >=300 10/01/2024 03:05 AM    PHUR 6.0 10/01/2024 03:05 AM    PHUR 6.0 01/26/2023 09:23 AM    WBCUA 0 TO 5 10/01/2024 03:05 AM    RBCUA 0 TO 2 10/01/2024 03:05 AM    BACTERIA 1+ 10/01/2024 03:05 AM    CLARITYU Clear 01/26/2023 09:23 AM    LEUKOCYTESUR NEGATIVE 10/01/2024 03:05 AM    UROBILINOGEN 0.2 10/01/2024 03:05 AM    BILIRUBINUR NEGATIVE 10/01/2024 03:05 AM    BLOODU LARGE 01/26/2023 09:23 AM    GLUCOSEU >=1000 10/01/2024 03:05 AM     HgBA1c:    Lab Results   Component Value Date/Time    LABA1C 6.6 10/01/2024 05:14 AM     Microalbumen/Creatinine ratio:  No components found for: \"RUCREAT\"  FLP:    Lab Results   Component Value Date/Time    TRIG 117 10/01/2024 05:14 AM    HDL 41 10/01/2024 05:14 AM     TSH:    Lab Results   Component Value Date/Time    TSH 1.00 10/01/2024 05:14 AM     VITAMIN B12: No components found for: \"B12\"  FOLATE:    Lab Results   Component Value Date/Time    FOLATE 13.3 10/04/2024 03:31 AM     IRON:    Lab Results   Component Value Date/Time    IRON 29 10/03/2024 03:47 AM     Iron Saturation:  No components found for: \"PERCENTFE\"  TIBC:    Lab Results   Component Value Date/Time    TIBC 227 10/03/2024 03:47 AM     FERRITIN:    Lab Results   Component Value Date/Time    FERRITIN 36 10/03/2024 03:47 AM            Assessment/Plan:    1-GENIA on CKD Stage 3b vs CKD4  -Baseline creatinine appears 1.5-1.9 mg/dL with GFR in mid 30s as of 9/2023  -first UA January 2023 to show significant proteinuria  -Cr continuing to rise 3.4 mg/dL -->3.6 -->4.0--> 4.2-->4.5 >4.7>4.9    -UPCR and UACR both to significant/heavy burden of proteinuria to even quantify

## 2024-10-06 NOTE — PROGRESS NOTES
Patient seen renal function continues to rise with edema and low albumin . Likely nephrotic syndrome. Continues with diuresis. May need dialysis. Continue with current care

## 2024-10-07 LAB
ALBUMIN SERPL-MCNC: 2.5 G/DL (ref 3.5–5.2)
ALP SERPL-CCNC: 107 U/L (ref 40–129)
ALT SERPL-CCNC: 11 U/L (ref 0–40)
ANION GAP SERPL CALCULATED.3IONS-SCNC: 8 MMOL/L (ref 7–16)
AST SERPL-CCNC: 19 U/L (ref 0–39)
BILIRUB SERPL-MCNC: <0.2 MG/DL (ref 0–1.2)
BUN SERPL-MCNC: 55 MG/DL (ref 6–23)
CALCIUM SERPL-MCNC: 7.3 MG/DL (ref 8.6–10.2)
CHLORIDE SERPL-SCNC: 104 MMOL/L (ref 98–107)
CO2 SERPL-SCNC: 22 MMOL/L (ref 22–29)
CREAT SERPL-MCNC: 5 MG/DL (ref 0.7–1.2)
GFR, ESTIMATED: 12 ML/MIN/1.73M2
GLUCOSE BLD-MCNC: 175 MG/DL (ref 74–99)
GLUCOSE BLD-MCNC: 194 MG/DL (ref 74–99)
GLUCOSE BLD-MCNC: 238 MG/DL (ref 74–99)
GLUCOSE BLD-MCNC: 82 MG/DL (ref 74–99)
GLUCOSE SERPL-MCNC: 148 MG/DL (ref 74–99)
POTASSIUM SERPL-SCNC: 4.6 MMOL/L (ref 3.5–5)
PROT SERPL-MCNC: 4.9 G/DL (ref 6.4–8.3)
SODIUM SERPL-SCNC: 134 MMOL/L (ref 132–146)

## 2024-10-07 PROCEDURE — 6370000000 HC RX 637 (ALT 250 FOR IP): Performed by: STUDENT IN AN ORGANIZED HEALTH CARE EDUCATION/TRAINING PROGRAM

## 2024-10-07 PROCEDURE — 80053 COMPREHEN METABOLIC PANEL: CPT

## 2024-10-07 PROCEDURE — 82962 GLUCOSE BLOOD TEST: CPT

## 2024-10-07 PROCEDURE — 6370000000 HC RX 637 (ALT 250 FOR IP): Performed by: GENERAL PRACTICE

## 2024-10-07 PROCEDURE — 2060000000 HC ICU INTERMEDIATE R&B

## 2024-10-07 PROCEDURE — 6360000002 HC RX W HCPCS: Performed by: STUDENT IN AN ORGANIZED HEALTH CARE EDUCATION/TRAINING PROGRAM

## 2024-10-07 PROCEDURE — 6370000000 HC RX 637 (ALT 250 FOR IP): Performed by: INTERNAL MEDICINE

## 2024-10-07 PROCEDURE — 2580000003 HC RX 258: Performed by: STUDENT IN AN ORGANIZED HEALTH CARE EDUCATION/TRAINING PROGRAM

## 2024-10-07 RX ADMIN — DIAZEPAM 5 MG: 5 TABLET ORAL at 12:11

## 2024-10-07 RX ADMIN — INSULIN GLARGINE 30 UNITS: 100 INJECTION, SOLUTION SUBCUTANEOUS at 20:49

## 2024-10-07 RX ADMIN — MAGNESIUM OXIDE 400 MG (241.3 MG MAGNESIUM) TABLET 400 MG: TABLET at 12:08

## 2024-10-07 RX ADMIN — BUMETANIDE 2 MG: 0.25 INJECTION INTRAMUSCULAR; INTRAVENOUS at 13:15

## 2024-10-07 RX ADMIN — NITROGLYCERIN 1 INCH: 20 OINTMENT TOPICAL at 15:55

## 2024-10-07 RX ADMIN — NITROGLYCERIN 1 INCH: 20 OINTMENT TOPICAL at 20:45

## 2024-10-07 RX ADMIN — CALCITRIOL 0.25 MCG: 0.25 CAPSULE ORAL at 20:49

## 2024-10-07 RX ADMIN — HYDRALAZINE HYDROCHLORIDE 50 MG: 50 TABLET ORAL at 20:43

## 2024-10-07 RX ADMIN — SACUBITRIL AND VALSARTAN 0.5 TABLET: 24; 26 TABLET, FILM COATED ORAL at 12:13

## 2024-10-07 RX ADMIN — SERTRALINE HYDROCHLORIDE 50 MG: 50 TABLET ORAL at 12:08

## 2024-10-07 RX ADMIN — HYDRALAZINE HYDROCHLORIDE 50 MG: 50 TABLET ORAL at 12:09

## 2024-10-07 RX ADMIN — PANTOPRAZOLE SODIUM 40 MG: 40 TABLET, DELAYED RELEASE ORAL at 06:01

## 2024-10-07 RX ADMIN — Medication 2000 UNITS: at 12:08

## 2024-10-07 RX ADMIN — MAGNESIUM OXIDE 400 MG (241.3 MG MAGNESIUM) TABLET 400 MG: TABLET at 20:43

## 2024-10-07 RX ADMIN — BUMETANIDE 2 MG: 0.25 INJECTION INTRAMUSCULAR; INTRAVENOUS at 20:41

## 2024-10-07 RX ADMIN — DIAZEPAM 5 MG: 5 TABLET ORAL at 20:43

## 2024-10-07 RX ADMIN — ASPIRIN 81 MG: 81 TABLET, COATED ORAL at 12:08

## 2024-10-07 RX ADMIN — METOPROLOL SUCCINATE 25 MG: 25 TABLET, EXTENDED RELEASE ORAL at 20:43

## 2024-10-07 RX ADMIN — ROSUVASTATIN CALCIUM 40 MG: 20 TABLET, FILM COATED ORAL at 20:43

## 2024-10-07 RX ADMIN — SODIUM CHLORIDE 125 MG: 900 INJECTION INTRAVENOUS at 12:19

## 2024-10-07 RX ADMIN — PANTOPRAZOLE SODIUM 40 MG: 40 TABLET, DELAYED RELEASE ORAL at 15:57

## 2024-10-07 RX ADMIN — HYDROXYZINE PAMOATE 25 MG: 25 CAPSULE ORAL at 20:43

## 2024-10-07 RX ADMIN — HYDRALAZINE HYDROCHLORIDE 50 MG: 50 TABLET ORAL at 06:04

## 2024-10-07 RX ADMIN — METOPROLOL SUCCINATE 25 MG: 25 TABLET, EXTENDED RELEASE ORAL at 12:09

## 2024-10-07 RX ADMIN — ALLOPURINOL 100 MG: 100 TABLET ORAL at 12:08

## 2024-10-07 RX ADMIN — NITROGLYCERIN 1 INCH: 20 OINTMENT TOPICAL at 06:01

## 2024-10-07 NOTE — PROGRESS NOTES
Spiritual Health History and Assessment/Progress Note  [facility]     Encounter, Rituals, Rites and Sacraments,  ,  ,      Name: Santosh Thomas MRN: [id number]    Age: 73 y.o.     Sex: male   Language: English   Sikhism: Rastafari   New onset of congestive heart failure (HCC)     Date: 10/7/2024                           Spiritual Assessment began in [address] MED SURG        Referral/Consult From: Rounding   Encounter Overview/Reason:  Encounter, Rituals, Rites and Sacraments  Service Provided For: Patient    Juhi, Belief, Meaning:   Patient is connected with a juhi tradition or spiritual practice  Family/Friends No family/friends present      Importance and Influence:  Patient has no beliefs influential to healthcare decision-making identified during this visit  Family/Friends No family/friends present    Community:  Patient feels well-supported. Support system includes: Other: Ex-wife, Son  Family/Friends No family/friends present    Assessment and Plan of Care:     Patient Interventions include: Provided sacramental/Denominational ritual  Family/Friends Interventions include: No family/friends present    Patient Plan of Care: Spiritual Care available upon further referral  Family/Friends Plan of Care: Spiritual Care available upon further referral    Electronically signed by Chaplain Lizette on 10/7/2024 at 4:29 PM

## 2024-10-07 NOTE — PLAN OF CARE

## 2024-10-07 NOTE — PROGRESS NOTES
Santosh asked RN to call his brother, Elvin.  Elvin was not listed as an emergency contact.  Santosh gave permission to share information with him and he has been added to contact list.  Spoke with elvin with update on patient status and careplan.

## 2024-10-07 NOTE — PROGRESS NOTES
PROGRESS NOTE       PATIENT PROBLEM LIST:  Patient Active Problem List   Diagnosis Code    Rhabdomyolysis M62.82    Delirium due to another medical condition F05    Diabetic foot infection ([facility]) E11.628, L08.9    Osteomyelitis of great toe of left foot M86.9    DKA, type 1, not at goal ([facility]) E10.10    Diabetic acidosis without coma ([facility]) E11.10    New onset of congestive heart failure ([facility]) I50.9       SUBJECTIVE:  Santosh Thomas feels somewhat better and denies any shortness of breath nor perceived side effects of his cardiac medical regimen.  His serum creatinine remains elevated however.  REVIEW OF SYSTEMS:  General ROS: positive for - fatigue.-Improving somewhat  Psychological ROS: positive for - anxiety.  Ophthalmic ROS: positive for - decreased vision and utilizes corrective lenses for visual acuity.    ENT ROS: negative  Allergy and Immunology ROS: negative  Hematological and Lymphatic ROS: negative  Endocrine: no heat or cold intolerance and no polyphagia, polydipsia, or polyuria  Respiratory ROS: positive for - cough and shortness of breath-improved  Cardiovascular ROS: positive for - edema and irregular heartbeat.  Gastrointestinal ROS: no abdominal pain, change in bowel habits, or black or bloody stools  Genito-Urinary ROS: no nocturia, dysuria, trouble voiding, frequency or hematuria  Musculoskeletal ROS: negative for- myalgias, arthralgias, or claudication  Neurological ROS: no TIA or stroke symptoms otherwise no significant change in symptoms or problems since yesterday as documented in previous progress notes.    SCHEDULED MEDICATIONS:   sacubitril-valsartan  0.5 tablet Oral Daily    ferric gluconate (FERRLECIT) 125 mg in sodium chloride 0.9 % 100 mL IVPB  125 mg IntraVENous Daily    bumetanide  2 mg IntraVENous BID    calcitRIOL  0.25 mcg Oral Once per day on Monday Wednesday Friday    allopurinol  100 mg Oral Daily    nitroglycerin  1 inch Topical 3 times per day    aspirin  81 mg Oral QAM     vitamin D  2,000 Units Oral Daily    diazePAM  5 mg Oral BID    [Held by provider] fenofibrate  160 mg Oral Daily    hydrALAZINE  50 mg Oral 3 times per day    hydrOXYzine pamoate  25 mg Oral Nightly    magnesium oxide  400 mg Oral BID    metoprolol succinate  25 mg Oral BID    pantoprazole  40 mg Oral BID AC    rosuvastatin  40 mg Oral Nightly    sertraline  50 mg Oral QAM    insulin glargine  30 Units SubCUTAneous Nightly    insulin lispro  0-4 Units SubCUTAneous TID WC    insulin lispro  0-4 Units SubCUTAneous Nightly       VITAL SIGNS:                                                                                                                          BP (!) 147/65   Pulse 63   Temp 98.3 °F (36.8 °C)   Resp 24   Ht 1.676 m (5' 6\")   Wt 89.7 kg (197 lb 12.8 oz)   SpO2 100%   BMI 31.93 kg/m²   Patient Vitals for the past 96 hrs (Last 3 readings):   Weight   10/04/24 0332 89.7 kg (197 lb 12.8 oz)     OBJECTIVE:    HEENT: PERRL, EOM  Intact; sclera non-icteric, conjunctiva pink. Carotids are brisk in upstroke with normal contour. No carotid bruits. Normal jugular venous pulsation at 45°. No palpable cervical nor supraclavicular nodes.Thyroid not palpable. Trachea midline.  Chest: Even excursion  Lungs: Grossly clear to auscultation bilaterally, no expiratory wheezes or rhonchi, no decreased tactile fremitus without inspiratory rales.  Heart: Regular  rhythm; S1 > S2, no gallop or murmur. No clicks, rub, palpable thrills   or heaves. PMI nondisplaced, 5th intercostal space MCL.   Abdomen: Soft, nontender, nondistended,  moderately protuberant, no masses or organomegaly.  Bowel sounds active.  Extremities: Without clubbing, cyanosis 2+ bilateral pretibial edema. Pulses present 3+ upper extermities bilaterally; barely palpable DP  bilaterally and barely palpable PT bilaterally.     Data:   Scheduled Meds: Reviewed  Continuous Infusions:    dextrose         Intake/Output Summary (Last 24 hours) at 10/7/2024

## 2024-10-07 NOTE — CONSULTS
Comprehensive Nutrition Assessment    Type and Reason for Visit:  Initial, RD Nutrition Re-Screen/LOS, Consult (Diogenes)    Nutrition Recommendations/Plan:   Continue current diet  Will add Ensure Compact daily, monitor tolerance     Malnutrition Assessment:  Malnutrition Status:  At risk for malnutrition (Comment) (10/07/24 4345)    Context:  Acute Illness     Findings of the 6 clinical characteristics of malnutrition:  Energy Intake:  75% or less of estimated energy requirements for 7 or more days  Weight Loss:  Unable to assess (d/t fluid shifts)     Body Fat Loss:  Unable to assess (pt receiving spiritual care when rounding)     Muscle Mass Loss:  Unable to assess    Fluid Accumulation:  Unable to assess (multifactorial)     Strength:  Not Performed    Nutrition Assessment:    Pt admits w/ new onset CHF and GENIA on CKD, likely nephrotic syndrome- will likely start dialysis this week per renal. Hx DM. Intakes appear variable, will add ONS daily.    Nutrition Related Findings:    A&O X4, -3.9L, +2-3 edema, abd soft/rounded, +BS, constipation, BUN/Cr 55/5, GFR 12, 10/4 Phos 5.3   Wound Type: None       Current Nutrition Intake & Therapies:    Average Meal Intake: 1-25%, 51-75%  Average Supplements Intake: None Ordered  ADULT DIET; Regular; 3 carb choices (45 gm/meal); Low Sodium (2 gm)    Anthropometric Measures:  Height: 167.6 cm (5' 6\")  Ideal Body Weight (IBW): 142 lbs (65 kg)    Admission Body Weight: 88.3 kg (194 lb 9.6 oz) (10/3 bed)  Current Body Weight: 89.7 kg (197 lb 12.8 oz) (10/4), 139.3 % IBW. Weight Source: Bed Scale  Current BMI (kg/m2): 31.9  Usual Body Weight: 79.4 kg (175 lb) (9/3/24 actual per [facility], 7/2024 170#)  % Weight Change (Calculated): 13                    BMI Categories: Obese Class 1 (BMI 30.0-34.9)    Estimated Daily Nutrient Needs:  Energy Requirements Based On: Formula  Weight Used for Energy Requirements: Usual  Energy (kcal/day):   Weight Used for Protein  Requirements: Ideal  Protein (g/day): 65-75 (1-1.2 as tolerated w/ GENIA on CKD, not on dialysis)  Method Used for Fluid Requirements: Standard Renal  Fluid (ml/day): per nephrology management    Nutrition Diagnosis:   Inadequate oral intake related to renal dysfunction as evidenced by intake 0-25%, intake 51-75%    Nutrition Interventions:   Food and/or Nutrient Delivery: Continue Current Diet, Start Oral Nutrition Supplement  Nutrition Education/Counseling: Education needed (prior to discharge pending medical outcomes)  Coordination of Nutrition Care: Continue to monitor while inpatient       Goals:     Goals: PO intake 75% or greater, by next RD assessment       Nutrition Monitoring and Evaluation:      Food/Nutrient Intake Outcomes: Food and Nutrient Intake, Supplement Intake  Physical Signs/Symptoms Outcomes: Biochemical Data, GI Status, Constipation, Fluid Status or Edema, Nutrition Focused Physical Findings, Skin, Weight    Discharge Planning:    Too soon to determine     Miranda D'Amico, RD, LD  Contact: [phone]

## 2024-10-07 NOTE — PROGRESS NOTES
Patient seen doing about the same at this point. Continues on diuretics, swelling and edema improved. No sob. Renal function worse gfr 12. May need dialysis soon. Patient reluctant to start. Vss afebrile. H/h stable on iron. Urine output sparse. Continue with current management

## 2024-10-07 NOTE — PROGRESS NOTES
Occupational Therapy  Patient treatment attempted this PM.  Patient speaking with spiritual care, will continue OT POC as able and appropriate.    Trice OSCAR/SARAH [id number]

## 2024-10-08 LAB
ALBUMIN SERPL-MCNC: 2.5 G/DL (ref 3.5–5.2)
ALP SERPL-CCNC: 100 U/L (ref 40–129)
ALT SERPL-CCNC: 15 U/L (ref 0–40)
ANION GAP SERPL CALCULATED.3IONS-SCNC: 11 MMOL/L (ref 7–16)
AST SERPL-CCNC: 16 U/L (ref 0–39)
BILIRUB SERPL-MCNC: 0.2 MG/DL (ref 0–1.2)
BUN SERPL-MCNC: 53 MG/DL (ref 6–23)
CALCIUM SERPL-MCNC: 7.7 MG/DL (ref 8.6–10.2)
CHLORIDE SERPL-SCNC: 105 MMOL/L (ref 98–107)
CO2 SERPL-SCNC: 22 MMOL/L (ref 22–29)
CREAT SERPL-MCNC: 4.9 MG/DL (ref 0.7–1.2)
ERYTHROCYTE [DISTWIDTH] IN BLOOD BY AUTOMATED COUNT: 15.7 % (ref 11.5–15)
GFR, ESTIMATED: 12 ML/MIN/1.73M2
GLUCOSE BLD-MCNC: 116 MG/DL (ref 74–99)
GLUCOSE BLD-MCNC: 89 MG/DL (ref 74–99)
GLUCOSE BLD-MCNC: 89 MG/DL (ref 74–99)
GLUCOSE SERPL-MCNC: 87 MG/DL (ref 74–99)
HCT VFR BLD AUTO: 28.9 % (ref 37–54)
HGB BLD-MCNC: 9.1 G/DL (ref 12.5–16.5)
MAGNESIUM SERPL-MCNC: 1.7 MG/DL (ref 1.6–2.6)
MCH RBC QN AUTO: 28.8 PG (ref 26–35)
MCHC RBC AUTO-ENTMCNC: 31.5 G/DL (ref 32–34.5)
MCV RBC AUTO: 91.5 FL (ref 80–99.9)
PHOSPHATE SERPL-MCNC: 5.2 MG/DL (ref 2.5–4.5)
PLATELET # BLD AUTO: 191 K/UL (ref 130–450)
PMV BLD AUTO: 10.5 FL (ref 7–12)
POTASSIUM SERPL-SCNC: 4.4 MMOL/L (ref 3.5–5)
PROT SERPL-MCNC: 5 G/DL (ref 6.4–8.3)
RBC # BLD AUTO: 3.16 M/UL (ref 3.8–5.8)
SODIUM SERPL-SCNC: 138 MMOL/L (ref 132–146)
WBC OTHER # BLD: 5.7 K/UL (ref 4.5–11.5)

## 2024-10-08 PROCEDURE — 6360000002 HC RX W HCPCS: Performed by: STUDENT IN AN ORGANIZED HEALTH CARE EDUCATION/TRAINING PROGRAM

## 2024-10-08 PROCEDURE — 80053 COMPREHEN METABOLIC PANEL: CPT

## 2024-10-08 PROCEDURE — 83735 ASSAY OF MAGNESIUM: CPT

## 2024-10-08 PROCEDURE — 6370000000 HC RX 637 (ALT 250 FOR IP): Performed by: GENERAL PRACTICE

## 2024-10-08 PROCEDURE — 0JH63XZ INSERTION OF TUNNELED VASCULAR ACCESS DEVICE INTO CHEST SUBCUTANEOUS TISSUE AND FASCIA, PERCUTANEOUS APPROACH: ICD-10-PCS | Performed by: RADIOLOGY

## 2024-10-08 PROCEDURE — 6370000000 HC RX 637 (ALT 250 FOR IP): Performed by: STUDENT IN AN ORGANIZED HEALTH CARE EDUCATION/TRAINING PROGRAM

## 2024-10-08 PROCEDURE — 6370000000 HC RX 637 (ALT 250 FOR IP): Performed by: INTERNAL MEDICINE

## 2024-10-08 PROCEDURE — 85027 COMPLETE CBC AUTOMATED: CPT

## 2024-10-08 PROCEDURE — 84100 ASSAY OF PHOSPHORUS: CPT

## 2024-10-08 PROCEDURE — 2580000003 HC RX 258: Performed by: STUDENT IN AN ORGANIZED HEALTH CARE EDUCATION/TRAINING PROGRAM

## 2024-10-08 PROCEDURE — 2060000000 HC ICU INTERMEDIATE R&B

## 2024-10-08 PROCEDURE — 97530 THERAPEUTIC ACTIVITIES: CPT

## 2024-10-08 PROCEDURE — 97535 SELF CARE MNGMENT TRAINING: CPT

## 2024-10-08 PROCEDURE — 82962 GLUCOSE BLOOD TEST: CPT

## 2024-10-08 PROCEDURE — 02HV33Z INSERTION OF INFUSION DEVICE INTO SUPERIOR VENA CAVA, PERCUTANEOUS APPROACH: ICD-10-PCS | Performed by: RADIOLOGY

## 2024-10-08 RX ADMIN — HYDRALAZINE HYDROCHLORIDE 50 MG: 50 TABLET ORAL at 20:57

## 2024-10-08 RX ADMIN — SERTRALINE HYDROCHLORIDE 50 MG: 50 TABLET ORAL at 09:47

## 2024-10-08 RX ADMIN — HYDRALAZINE HYDROCHLORIDE 50 MG: 50 TABLET ORAL at 06:06

## 2024-10-08 RX ADMIN — NITROGLYCERIN 1 INCH: 20 OINTMENT TOPICAL at 20:58

## 2024-10-08 RX ADMIN — SACUBITRIL AND VALSARTAN 1 TABLET: 24; 26 TABLET, FILM COATED ORAL at 20:56

## 2024-10-08 RX ADMIN — MAGNESIUM OXIDE 400 MG (241.3 MG MAGNESIUM) TABLET 400 MG: TABLET at 09:42

## 2024-10-08 RX ADMIN — HYDROXYZINE PAMOATE 25 MG: 25 CAPSULE ORAL at 20:57

## 2024-10-08 RX ADMIN — NITROGLYCERIN 1 INCH: 20 OINTMENT TOPICAL at 06:06

## 2024-10-08 RX ADMIN — ROSUVASTATIN CALCIUM 40 MG: 20 TABLET, FILM COATED ORAL at 20:56

## 2024-10-08 RX ADMIN — MAGNESIUM OXIDE 400 MG (241.3 MG MAGNESIUM) TABLET 400 MG: TABLET at 20:57

## 2024-10-08 RX ADMIN — ALLOPURINOL 100 MG: 100 TABLET ORAL at 09:47

## 2024-10-08 RX ADMIN — NITROGLYCERIN 1 INCH: 20 OINTMENT TOPICAL at 14:37

## 2024-10-08 RX ADMIN — HYDRALAZINE HYDROCHLORIDE 50 MG: 50 TABLET ORAL at 14:37

## 2024-10-08 RX ADMIN — SODIUM CHLORIDE 125 MG: 900 INJECTION INTRAVENOUS at 09:42

## 2024-10-08 RX ADMIN — DIAZEPAM 5 MG: 5 TABLET ORAL at 20:57

## 2024-10-08 RX ADMIN — PANTOPRAZOLE SODIUM 40 MG: 40 TABLET, DELAYED RELEASE ORAL at 06:06

## 2024-10-08 RX ADMIN — INSULIN GLARGINE 30 UNITS: 100 INJECTION, SOLUTION SUBCUTANEOUS at 20:54

## 2024-10-08 RX ADMIN — ASPIRIN 81 MG: 81 TABLET, COATED ORAL at 09:47

## 2024-10-08 RX ADMIN — PANTOPRAZOLE SODIUM 40 MG: 40 TABLET, DELAYED RELEASE ORAL at 14:37

## 2024-10-08 RX ADMIN — METOPROLOL SUCCINATE 25 MG: 25 TABLET, EXTENDED RELEASE ORAL at 20:57

## 2024-10-08 RX ADMIN — DIAZEPAM 5 MG: 5 TABLET ORAL at 09:42

## 2024-10-08 RX ADMIN — SACUBITRIL AND VALSARTAN 0.5 TABLET: 24; 26 TABLET, FILM COATED ORAL at 09:47

## 2024-10-08 RX ADMIN — Medication 2000 UNITS: at 09:46

## 2024-10-08 NOTE — PROGRESS NOTES
PROGRESS NOTE       PATIENT PROBLEM LIST:  Patient Active Problem List   Diagnosis Code    Rhabdomyolysis M62.82    Delirium due to another medical condition F05    Diabetic foot infection ([facility]) E11.628, L08.9    Osteomyelitis of great toe of left foot M86.9    DKA, type 1, not at goal ([facility]) E10.10    Diabetic acidosis without coma ([facility]) E11.10    New onset of congestive heart failure ([facility]) I50.9       SUBJECTIVE:  Santosh Thomas states that he is comfortable and denies any shortness of breath, palpitations, lightheadedness nor chest discomfort presently.  His serum creatinine has decreased minimally to 4.9 mg/dL.   REVIEW OF SYSTEMS:  General ROS: positive for - fatigue.-Improving somewhat  Psychological ROS: positive for - anxiety.  Ophthalmic ROS: positive for - decreased vision and utilizes corrective lenses for visual acuity.    ENT ROS: negative  Allergy and Immunology ROS: negative  Hematological and Lymphatic ROS: negative  Endocrine: no heat or cold intolerance and no polyphagia, polydipsia, or polyuria  Respiratory ROS: positive for - cough and shortness of breath-improved  Cardiovascular ROS: positive for - edema and irregular heartbeat.  Gastrointestinal ROS: no abdominal pain, change in bowel habits, or black or bloody stools  Genito-Urinary ROS: no nocturia, dysuria, trouble voiding, frequency or hematuria  Musculoskeletal ROS: negative for- myalgias, arthralgias, or claudication  Neurological ROS: no TIA or stroke symptoms otherwise no significant change in symptoms or problems since yesterday as documented in previous progress notes.    SCHEDULED MEDICATIONS:   sacubitril-valsartan  0.5 tablet Oral Daily    ferric gluconate (FERRLECIT) 125 mg in sodium chloride 0.9 % 100 mL IVPB  125 mg IntraVENous Daily    [Held by provider] bumetanide  2 mg IntraVENous BID    calcitRIOL  0.25 mcg Oral Once per day on Monday Wednesday Friday    allopurinol  100 mg Oral Daily    nitroglycerin  1 inch Topical 3  times per day    aspirin  81 mg Oral QAM    vitamin D  2,000 Units Oral Daily    diazePAM  5 mg Oral BID    [Held by provider] fenofibrate  160 mg Oral Daily    hydrALAZINE  50 mg Oral 3 times per day    hydrOXYzine pamoate  25 mg Oral Nightly    magnesium oxide  400 mg Oral BID    metoprolol succinate  25 mg Oral BID    pantoprazole  40 mg Oral BID AC    rosuvastatin  40 mg Oral Nightly    sertraline  50 mg Oral QAM    insulin glargine  30 Units SubCUTAneous Nightly    insulin lispro  0-4 Units SubCUTAneous TID WC    insulin lispro  0-4 Units SubCUTAneous Nightly       VITAL SIGNS:                                                                                                                          BP (!) 162/76   Pulse 63   Temp 97.7 °F (36.5 °C) (Axillary)   Resp 16   Ht 1.676 m (5' 6\")   Wt 89.7 kg (197 lb 12.8 oz)   SpO2 96%   BMI 31.93 kg/m²   No data found.    OBJECTIVE:    HEENT: PERRL, EOM  Intact; sclera non-icteric, conjunctiva pink. Carotids are brisk in upstroke with normal contour. No carotid bruits. Normal jugular venous pulsation at 45°. No palpable cervical nor supraclavicular nodes.Thyroid not palpable. Trachea midline.  Chest: Even excursion  Lungs: Grossly clear to auscultation bilaterally, no expiratory wheezes or rhonchi, no decreased tactile fremitus without inspiratory rales.  Heart: Regular  rhythm; S1 > S2, no gallop or murmur. No clicks, rub, palpable thrills   or heaves. PMI nondisplaced, 5th intercostal space MCL.   Abdomen: Soft, nontender, nondistended,  moderately protuberant, no masses or organomegaly.  Bowel sounds active.  Extremities: Without clubbing, cyanosis 2+ bilateral pretibial edema. Pulses present 3+ upper extermities bilaterally; barely palpable DP  bilaterally and barely palpable PT bilaterally.     Data:   Scheduled Meds: Reviewed  Continuous Infusions:    dextrose         Intake/Output Summary (Last 24 hours) at 10/8/2024 1305  Last data filed at 10/8/2024

## 2024-10-08 NOTE — CARE COORDINATION
Palliative, Cardiology, Nephrology are following.  Patients discharge plan is home when medically stable, he declined HHC. Per Nephrology patient is agreeable to IHD. Bumex on hold. Cr. 4.9 BUN 53, poss kidney biopsy. Will continue to follow.  Brooke KUMARIN, RN  Case Management

## 2024-10-08 NOTE — PROGRESS NOTES
[facility]  Nephrology Progress Note  Patient's Name: Santosh Thomas  9:47 PM  10/7/2024    Nephrologist: seen twice inpatient by [facility] in 2018 with no subsequent outpatient follow up    Reason for Consult:  GENIA  Requesting Physician:  Devaughn Reed DO    Chief Complaint:  weakness    History Obtained From:  patient, chart    History of Present Illness from the 10/6/24 note by Dr. Antoine:    Santosh Thomas is a 73 y.o. male with past medical history of longstanding type 2 diabetes now on insulin therapy, hypertension, diabetic complications including neuropathy and what sounds to be retinopathy as he follows Dr. Vazquez at [facility] and has required injections in the past, toe amputation of the left foot.  He also has history of hypertriglyce so with podiatry.  Ridemia good resolved on fenofibrate presently.  He also has some history of chronic kidney disease, likely stage IIIb based off recent labs that are available to review in the chart.  He has had multiple acute kidney injuries, our practice is seen him on 2 occurrences in 2018, once was felt to be related to rhabdomyolysis.  He did have recovery with IV fluids.  He had also had a history of NSAID use at that time.  He did not have any proteinuria at that time.  He was seen on a separate occasion in 2018 when serum creatinine peaked at 4.0 mg/dL on presentation though quickly improved to a baseline of 1.2 mg/dL.  We have not seen him since.  He has had a baseline serum creatinine of what appears to be 1.3 up to around 2.0 mg/dL over the past 1-1/2 years.  He has not seen nephrology.  Of concern, a urinalysis in January 2023 which showed a significant increase of proteinuria.    Patient presented to the hospital yesterday after being advised by his primary care physician to present.  The patient gives a history of having increased leg swelling, worsening of exertional dyspnea, weakness, fatigue that has worsened rather drastically over the past  distended.  Extremeties: 3+ pitting bilateral lower extremity edema extending up to the mid thigh.      DATA:    XR CHEST PORTABLE   Final Result   There is right middle lobe opacity concerning for pneumonia and or   atelectasis.         US RETROPERITONEAL COMPLETE   Final Result   1.  Mild bilateral renal cortical thinning.  There appears to be a small   amount of right perinephric fluid.  No hydronephrosis seen.      2.  Normal appearance of the imaged bladder.  Left ureteric jet was not able   to be demonstrated.  Right ureteric jet is present.         XR CHEST (2 VW)   Final Result   No acute process.         Vascular duplex lower extremity venous bilateral   Final Result   No evidence of DVT in either lower extremity.               Labs:    CBC:   No results for input(s): \"WBC\", \"HGB\", \"PLT\" in the last 72 hours.       Lab Results   Component Value Date    IRON 29 (L) 10/03/2024    TIBC 227 (L) 10/03/2024    FERRITIN 36 10/03/2024       Lab Results   Component Value Date    CALCIUM 7.3 (L) 10/07/2024    CALCIUM 7.8 (L) 10/06/2024    CALCIUM 8.2 (L) 10/05/2024    CAION 1.22 01/15/2018    CAION 1.10 (L) 01/14/2018    PHOS 5.3 (H) 10/04/2024    PHOS 5.1 (H) 10/03/2024    PHOS 2.8 01/27/2023    MG 1.3 (L) 09/30/2024    MG 1.7 01/27/2023    MG 1.8 01/27/2023       BMP:   Recent Labs     10/05/24  0440 10/06/24  0302 10/07/24  0230    136 134   K 3.9 4.3 4.6    106 104   CO2 20* 21* 22   BUN 50* 52* 55*   CREATININE 4.7* 4.9* 5.0*   GLUCOSE 37* 42* 148*             Labs:  CBC with Differential:    Lab Results   Component Value Date/Time    WBC 6.3 10/04/2024 03:31 AM    RBC 3.13 10/04/2024 03:31 AM    HGB 9.1 10/04/2024 03:31 AM    HCT 29.2 10/04/2024 03:31 AM     10/04/2024 03:31 AM    MCV 93.3 10/04/2024 03:31 AM    MCH 29.1 10/04/2024 03:31 AM    MCHC 31.2 10/04/2024 03:31 AM    RDW 15.9 10/04/2024 03:31 AM    METASPCT 1 10/04/2024 03:31 AM    LYMPHOPCT 11 10/04/2024 03:31 AM    MONOPCT 8     HDL 41 10/01/2024 05:14 AM     TSH:    Lab Results   Component Value Date/Time    TSH 1.00 10/01/2024 05:14 AM     VITAMIN B12: No components found for: \"B12\"  FOLATE:    Lab Results   Component Value Date/Time    FOLATE 13.3 10/04/2024 03:31 AM     IRON:    Lab Results   Component Value Date/Time    IRON 29 10/03/2024 03:47 AM     Iron Saturation:  No components found for: \"PERCENTFE\"  TIBC:    Lab Results   Component Value Date/Time    TIBC 227 10/03/2024 03:47 AM     FERRITIN:    Lab Results   Component Value Date/Time    FERRITIN 36 10/03/2024 03:47 AM            Assessment/Plan:    1-GENIA on CKD Stage 3b   -Baseline creatinine appears 1.5-1.9 mg/dL with GFR in mid 30s as of 9/2023  -first UA January 2023 to show significant proteinuria  -Cr up from 3.5 to 5.0mg/dl  -UPCR and UACR both to significant/heavy burden of proteinuria to even quantify on spot collection, 24 urine protein 2987mg  -Urinalysis did not show any microscopic hematuria with 0-2 WBCs and RBCs per high-power field  -His edema, history of hyperlipidemia, proteinuria and low albumin all suggestive of an underlying nephrotic syndrome the most likely cause would be diabetic nephropathy given his other diabetic comorbidities and duration of having diabetes  -serologies pending, so far C3 and C4 as well as hepatitis and HIV are negative and the SFLC panel, the ratio is normal, light chains are elevated as they are cleared by the kidneys  PLAN:  -Hold twice daily Bumex 10/8  -follow labs  -will consider biopsy pending results of workup   -Follow I/O's  -discussed w/patient and Dr. Boone, will continue  Entresto given HFrEF  -Discussed IHD with pt and he is agreeable if necessary    2-htn with ckd 1-4  -Patient presented with hypertensive urgency, blood pressure of 217/100 mmHg on arrival  -He blood pressure near goal <130/80(ACC/AHA Target)  PLAN:  -Continue  low-dose Entresto   -On Hydralazine 50mg tid,Metoprolol succinate 25mg bid,Nitro 2%

## 2024-10-08 NOTE — PROGRESS NOTES
Physical Therapy  Facility/Department: [address] MED SURG  Physical Therapy Treatment Note    Name: Santosh Thomas  : 1950  MRN: [id number]  Date of Service: 10/8/2024                Patient Diagnosis(es): The primary encounter diagnosis was New onset of congestive heart failure (HCC). Diagnoses of Acute renal failure superimposed on chronic kidney disease, unspecified acute renal failure type, unspecified CKD stage (HCC), Hypomagnesemia, Acute otitis externa of right ear, unspecified type, and Hypertension, unspecified type were also pertinent to this visit.  Past Medical History:  has a past medical history of Dementia (HCC), Depression, Diabetes mellitus (HCC), Hypertension, and Neuropathy.  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              Evaluating Therapist: Emily Currie, PT     Rec ww     Referring Provider:  Devaughn Reed DO     PT order : PT eval and treat     Room #: 610  DIAGNOSIS: The primary encounter diagnosis was New onset of congestive heart failure (HCC). Diagnoses of Acute renal failure superimposed on chronic kidney disease, unspecified acute renal failure type, unspecified CKD stage (HCC), Hypomagnesemia, Acute otitis externa of right ear, unspecified type, and Hypertension, unspecified type were also pertinent to this visit.  PRECAUTIONS: falls     Social:  Pt lives alone  in a  1  floor plan  apartment  steps and  1  rails to enter.  Prior to admission pt walked with  cane      Initial Evaluation  Date:  10/1/2024  Treatment  10/8/2024    Short Term/ Long Term   Goals   Was pt agreeable to Eval/treatment?  Yes  yes    Does pt have pain?  Discomfort from LE edema  Buttocks soreness    Bed Mobility  Rolling:  NT   Supine to sit: min assist   Sit to supine:  min assist   Scooting:  SBA in sit  NT Independent    Transfers Sit to stand:  SBA   Stand to sit:  SBA   Stand  pivot:  NT  Sit to stand; Mod A low toilet  Stand to sit; SBA  Independent    Ambulation     200  feet with  ww  with  SBA  210 feet with WW SBA  200  feet with  ww  with  independent        Stair negotiation: ascended and descended NT  NT  4  steps with  1  rail with  S/SBA    LE ROM  AAROM WFL      LE strength  3-/ 5 hips, 4-/ 5 distally      AM- PAC RAW score  17/ 24 16/24      Pt is alert, following instruction, states hard to get off the toilet  Balance: fair dynamic with WW    Pt performed therapeutic exercise of the following: NT    Patient education/treatment  Pt was educated on UE usage transfer safety, gait mechanics for upright posture/staying within WW base of support    Patient response to education:   Pt verbalized understanding Pt demonstrated skill Pt requires further education in this area   yes With prompt transfers yes     ASSESSMENT:   Comments: Nurse ok with Rx. Pt found in the bathroom, stood SBA at the sink for hygiene care. Gait slow, and consistent, no loss of balance/shortness of breath/dizziness noted. Pt fatigued after activity.    Pt was left in a bedside chair with call light in reach    Time in 1355   Time out 1415   Total Treatment Time 20 minutes   CPT codes:     Therapeutic activities 63757 20 minutes   Therapeutic exercises 72581 0 minutes       Pt is making consistent progress toward established Physical Therapy goals per gait, noted increased difficulty with transfers today.  Continue with physical therapy current plan of care.    Korey Orozco PTA   License Number: [id number]

## 2024-10-08 NOTE — PROGRESS NOTES
Chart reviewed.  Palliative medicine  was able to speak with patient ex-wife Viviana, who confirmed, she has updated HCPOA documents, she is going to provide copies when available.  Latonia confirmed, patient's statement, Sister Lou is patient's POA, and Latonia is the alternate the POA.  Goals of care and CODE STATUS were discussed with the patient, he had expressed to want to pursue treatment, now agreeing to pursue dialysis if needed, and he is also going to consider kidney biopsy.  Goal of discharge to return home where he live by himself, voicing no needs at discharge, he has declined HHC.  Goals of care and CODE STATUS has been established.  No further PM needs has been identified.  Going to sign off for now.  Please reconsult if new PM needs arises.

## 2024-10-08 NOTE — PLAN OF CARE

## 2024-10-08 NOTE — PROGRESS NOTES
[facility]  Nephrology Progress Note  Patient's Name: Santosh Thomas  4:10 PM  10/8/2024    Nephrologist: seen twice inpatient by [facility] in 2018 with no subsequent outpatient follow up    Reason for Consult:  GENIA  Requesting Physician:  Devaughn Reed DO    Chief Complaint:  weakness    History Obtained From:  patient, chart    History of Present Illness from the 10/6/24 note by Dr. Antoine:    Santosh Thomas is a 73 y.o. male with past medical history of longstanding type 2 diabetes now on insulin therapy, hypertension, diabetic complications including neuropathy and what sounds to be retinopathy as he follows Dr. Vazquez at [facility] and has required injections in the past, toe amputation of the left foot.  He also has history of hypertriglyce so with podiatry.  Ridemia good resolved on fenofibrate presently.  He also has some history of chronic kidney disease, likely stage IIIb based off recent labs that are available to review in the chart.  He has had multiple acute kidney injuries, our practice is seen him on 2 occurrences in 2018, once was felt to be related to rhabdomyolysis.  He did have recovery with IV fluids.  He had also had a history of NSAID use at that time.  He did not have any proteinuria at that time.  He was seen on a separate occasion in 2018 when serum creatinine peaked at 4.0 mg/dL on presentation though quickly improved to a baseline of 1.2 mg/dL.  We have not seen him since.  He has had a baseline serum creatinine of what appears to be 1.3 up to around 2.0 mg/dL over the past 1-1/2 years.  He has not seen nephrology.  Of concern, a urinalysis in January 2023 which showed a significant increase of proteinuria.    Patient presented to the hospital yesterday after being advised by his primary care physician to present.  The patient gives a history of having increased leg swelling, worsening of exertional dyspnea, weakness, fatigue that has worsened rather drastically over the past  0.5 tablet  0.5 tablet Oral BID Rancho Boone DO        white petrolatum ointment   Topical BID PRN Devaughn Reed DO        ferric gluconate (FERRLECIT) 125 mg in sodium chloride 0.9 % 100 mL IVPB  125 mg IntraVENous Daily Dax Crowder MD   Stopped at 10/08/24 1058    [Held by provider] bumetanide (BUMEX) injection 2 mg  2 mg IntraVENous BID Dax Crowder MD   2 mg at 10/07/24 2041    albuterol (PROVENTIL) (2.5 MG/3ML) 0.083% nebulizer solution 2.5 mg  2.5 mg Nebulization Q4H PRN Devaughn Reed DO   2.5 mg at 10/04/24 0318    calcitRIOL (ROCALTROL) capsule 0.25 mcg  0.25 mcg Oral Once per day on Monday Wednesday Friday Dax Crowder MD   0.25 mcg at 10/07/24 2049    allopurinol (ZYLOPRIM) tablet 100 mg  100 mg Oral Daily Dax Crowder MD   100 mg at 10/08/24 0947    nitroglycerin (NITRO-BID) 2 % ointment 1 inch  1 inch Topical 3 times per day Rancho Boone DO   1 inch at 10/08/24 1437    acetaminophen (TYLENOL) tablet 650 mg  650 mg Oral Q4H PRN Devaughn Reed DO   650 mg at 10/06/24 2141    [Held by provider] aspirin EC tablet 81 mg  81 mg Oral QAM Devaughn Reed DO   81 mg at 10/08/24 0947    vitamin D (CHOLECALCIFEROL) tablet 2,000 Units  2,000 Units Oral Daily Devaughn Reed DO   2,000 Units at 10/08/24 0946    diazePAM (VALIUM) tablet 5 mg  5 mg Oral BID Devaughn Reed DO   5 mg at 10/08/24 0942    [Held by provider] fenofibrate tablet 160 mg  160 mg Oral Daily Devaughn Reed DO        hydrALAZINE (APRESOLINE) tablet 50 mg  50 mg Oral 3 times per day Rancho Boone DO   50 mg at 10/08/24 1437    hydrOXYzine pamoate (VISTARIL) capsule 25 mg  25 mg Oral Nightly Devaughn Reed DO   25 mg at 10/07/24 2043    magnesium oxide (MAG-OX) tablet 400 mg  400 mg Oral BID Devaughn Reed DO   400 mg at 10/08/24 0942    metoprolol succinate (TOPROL XL) extended release tablet 25 mg  25 mg Oral BID Devaughn Reed DO   25 mg at 10/07/24 2043    pantoprazole (PROTONIX) tablet 40 mg  40 mg Oral BID  AC eDvaughn Reed DO   40 mg at 10/08/24 1437    rosuvastatin (CRESTOR) tablet 40 mg  40 mg Oral Nightly Devaughn Reed DO   40 mg at 10/07/24 2043    sertraline (ZOLOFT) tablet 50 mg  50 mg Oral QAM Devaughn Reed DO   50 mg at 10/08/24 0947    insulin glargine (LANTUS) injection vial 30 Units  30 Units SubCUTAneous Nightly Devaughn Reed DO   30 Units at 10/07/24 2049    glucose chewable tablet 16 g  4 tablet Oral PRN Devaughn Reed DO   16 g at 10/05/24 0616    dextrose bolus 10% 125 mL  125 mL IntraVENous PRN Devaughn Reed DO   Stopped at 10/06/24 0425    Or    dextrose bolus 10% 250 mL  250 mL IntraVENous PRN Devaughn Reed DO        glucagon injection 1 mg  1 mg SubCUTAneous PRN Devaughn Reed DO        dextrose 10 % infusion   IntraVENous Continuous PRN Devaughn Reed DO        insulin lispro (HUMALOG,ADMELOG) injection vial 0-4 Units  0-4 Units SubCUTAneous TID WC Devaughn Reed DO   3 Units at 10/06/24 1717    insulin lispro (HUMALOG,ADMELOG) injection vial 0-4 Units  0-4 Units SubCUTAneous Nightly Devaughn Reed DO               PHYSICAL EXAM:     Vital SignsBP (!) 164/70   Pulse 66   Temp 97.8 °F (36.6 °C) (Infrared)   Resp 18   Ht 1.676 m (5' 6\")   Wt 89.7 kg (197 lb 12.8 oz)   SpO2 96%   BMI 31.93 kg/m²   VITALS:  BP (!) 164/70   Pulse 66   Temp 97.8 °F (36.6 °C) (Infrared)   Resp 18   Ht 1.676 m (5' 6\")   Wt 89.7 kg (197 lb 12.8 oz)   SpO2 96%   BMI 31.93 kg/m²   24HR INTAKE/OUTPUT:    Intake/Output Summary (Last 24 hours) at 10/8/2024 1610  Last data filed at 10/8/2024 0907  Gross per 24 hour   Intake 360 ml   Output 2700 ml   Net -2340 ml     HEENT: Head is normocephalic and atraumatic.  Pupils are equal and reactive to light and accommodation.  Oral mucosa dry.  Neck: No JVD  Chest: Chest is symmetrical,  Lungs: Slightly diminished at the bases, slight rhonchi at right base  Heart: Regular rate and rhythm. No S3 gallop. No  murmrur.  Abdomen: Abdomen is obese, soft,

## 2024-10-08 NOTE — PROGRESS NOTES
Patient seen about the same. Diuretics being held. Will monitor renal function from there. Still may need kidney biopsy and possible dialysis now or in the future

## 2024-10-08 NOTE — PROGRESS NOTES
Occupational Therapy  OT BEDSIDE TREATMENT NOTE      Date:10/8/2024  Patient Name: Santosh Thomas  MRN: [id number]  : 1950  Room: [address] [place] [address]     Evaluating OT: Dee Dee Gonzalez, OTR/SARAH - [id number]     Referring Provider: Devaughn Reed DO  Specific Provider Orders/Date: \"OT eval and treat\" - 10/1/2024     Diagnosis: Hypomagnesemia [E83.42]  New onset of congestive heart failure (HCC) [I50.9]  Acute renal failure superimposed on chronic kidney disease, unspecified acute renal failure type, unspecified CKD stage (HCC) [N17.9, N18.9]      Pertinent Medical History: dementia, depression, DM, HTN, neuropathy      Precautions: fall risk     Assessment of Current Deficits:    [x] Functional mobility             [x] ADLs          [x] Strength                  [x] Cognition   [x] Functional transfers           [x] IADLs         [x] Safety Awareness   [x] Endurance   [] Fine Motor Coordination    [x] Balance      [] Vision/Perception   [x] Sensation    [] Gross Motor Coordination [] ROM          [] Delirium                  [] Motor Control      OT PLAN OF CARE   OT POC is based on physician orders, patient diagnosis, and results of clinical assessment.  Frequency/Duration 2-5 days/week for 2-4 weeks PRN   Specific OT Treatment Interventions to Include:   * Instruction/training on adapted ADL techniques and AE recommendations to increase functional independence within precautions       * Training on energy conservation strategies, correct breathing pattern and techniques to improve independence/tolerance for self-care routine  * Functional transfer/mobility training/DME recommendations for increased independence, safety, and fall prevention  * Patient/Family education to increase follow through with safety techniques and functional independence  * Recommendation of environmental modifications for increased safety with functional transfers/mobility and ADLs  * Cognitive retraining/development of therapeutic    Therapeutic Activities 87262 15 1   ADL/Self Care 41003 15 1   Orthotic Management 23116     Neuro Re-Ed [id number]     Non-Billable Time     TOTAL TIMED TREATMENT 30 2       Trice OSCAR/SARAH [id number]

## 2024-10-09 LAB
ALBUMIN SERPL-MCNC: 2.5 G/DL (ref 3.5–5.2)
ALP SERPL-CCNC: 96 U/L (ref 40–129)
ALT SERPL-CCNC: 12 U/L (ref 0–40)
ANION GAP SERPL CALCULATED.3IONS-SCNC: 10 MMOL/L (ref 7–16)
AST SERPL-CCNC: 14 U/L (ref 0–39)
BILIRUB SERPL-MCNC: 0.2 MG/DL (ref 0–1.2)
BUN SERPL-MCNC: 53 MG/DL (ref 6–23)
CALCIUM SERPL-MCNC: 8.1 MG/DL (ref 8.6–10.2)
CHLORIDE SERPL-SCNC: 105 MMOL/L (ref 98–107)
CO2 SERPL-SCNC: 22 MMOL/L (ref 22–29)
CREAT SERPL-MCNC: 4.5 MG/DL (ref 0.7–1.2)
ERYTHROCYTE [DISTWIDTH] IN BLOOD BY AUTOMATED COUNT: 15.6 % (ref 11.5–15)
GFR, ESTIMATED: 13 ML/MIN/1.73M2
GLUCOSE BLD-MCNC: 109 MG/DL (ref 74–99)
GLUCOSE BLD-MCNC: 153 MG/DL (ref 74–99)
GLUCOSE BLD-MCNC: 155 MG/DL (ref 74–99)
GLUCOSE BLD-MCNC: 49 MG/DL (ref 74–99)
GLUCOSE BLD-MCNC: 52 MG/DL (ref 74–99)
GLUCOSE BLD-MCNC: 56 MG/DL (ref 74–99)
GLUCOSE BLD-MCNC: 72 MG/DL (ref 74–99)
GLUCOSE BLD-MCNC: 88 MG/DL (ref 74–99)
GLUCOSE SERPL-MCNC: 84 MG/DL (ref 74–99)
HBV SURFACE AB SERPL IA-ACNC: <3.1 MIU/ML (ref 0–9.99)
HBV SURFACE AG SERPL QL IA: NONREACTIVE
HCT VFR BLD AUTO: 28.3 % (ref 37–54)
HCV AB SERPL QL IA: NONREACTIVE
HGB BLD-MCNC: 9.2 G/DL (ref 12.5–16.5)
INR PPP: 1
MAGNESIUM SERPL-MCNC: 1.8 MG/DL (ref 1.6–2.6)
MCH RBC QN AUTO: 29.8 PG (ref 26–35)
MCHC RBC AUTO-ENTMCNC: 32.5 G/DL (ref 32–34.5)
MCV RBC AUTO: 91.6 FL (ref 80–99.9)
PARTIAL THROMBOPLASTIN TIME: 36.4 SEC (ref 24.5–35.1)
PHOSPHATE SERPL-MCNC: 4.5 MG/DL (ref 2.5–4.5)
PLATELET # BLD AUTO: 201 K/UL (ref 130–450)
PMV BLD AUTO: 10.5 FL (ref 7–12)
POTASSIUM SERPL-SCNC: 4.6 MMOL/L (ref 3.5–5)
PROT SERPL-MCNC: 5.4 G/DL (ref 6.4–8.3)
PROTHROMBIN TIME: 10.9 SEC (ref 9.3–12.4)
RBC # BLD AUTO: 3.09 M/UL (ref 3.8–5.8)
SODIUM SERPL-SCNC: 137 MMOL/L (ref 132–146)
WBC OTHER # BLD: 6.8 K/UL (ref 4.5–11.5)

## 2024-10-09 PROCEDURE — 6370000000 HC RX 637 (ALT 250 FOR IP): Performed by: GENERAL PRACTICE

## 2024-10-09 PROCEDURE — 86803 HEPATITIS C AB TEST: CPT

## 2024-10-09 PROCEDURE — 85027 COMPLETE CBC AUTOMATED: CPT

## 2024-10-09 PROCEDURE — 6370000000 HC RX 637 (ALT 250 FOR IP): Performed by: STUDENT IN AN ORGANIZED HEALTH CARE EDUCATION/TRAINING PROGRAM

## 2024-10-09 PROCEDURE — 6370000000 HC RX 637 (ALT 250 FOR IP): Performed by: INTERNAL MEDICINE

## 2024-10-09 PROCEDURE — 2060000000 HC ICU INTERMEDIATE R&B

## 2024-10-09 PROCEDURE — 6360000002 HC RX W HCPCS: Performed by: INTERNAL MEDICINE

## 2024-10-09 PROCEDURE — 85730 THROMBOPLASTIN TIME PARTIAL: CPT

## 2024-10-09 PROCEDURE — 2580000003 HC RX 258: Performed by: STUDENT IN AN ORGANIZED HEALTH CARE EDUCATION/TRAINING PROGRAM

## 2024-10-09 PROCEDURE — 84100 ASSAY OF PHOSPHORUS: CPT

## 2024-10-09 PROCEDURE — 80053 COMPREHEN METABOLIC PANEL: CPT

## 2024-10-09 PROCEDURE — 82962 GLUCOSE BLOOD TEST: CPT

## 2024-10-09 PROCEDURE — 87340 HEPATITIS B SURFACE AG IA: CPT

## 2024-10-09 PROCEDURE — 2580000003 HC RX 258: Performed by: GENERAL PRACTICE

## 2024-10-09 PROCEDURE — 83735 ASSAY OF MAGNESIUM: CPT

## 2024-10-09 PROCEDURE — 97530 THERAPEUTIC ACTIVITIES: CPT

## 2024-10-09 PROCEDURE — 85610 PROTHROMBIN TIME: CPT

## 2024-10-09 PROCEDURE — 6360000002 HC RX W HCPCS: Performed by: STUDENT IN AN ORGANIZED HEALTH CARE EDUCATION/TRAINING PROGRAM

## 2024-10-09 PROCEDURE — 86317 IMMUNOASSAY INFECTIOUS AGENT: CPT

## 2024-10-09 RX ADMIN — HYDRALAZINE HYDROCHLORIDE 50 MG: 50 TABLET ORAL at 06:05

## 2024-10-09 RX ADMIN — MAGNESIUM OXIDE 400 MG (241.3 MG MAGNESIUM) TABLET 400 MG: TABLET at 10:20

## 2024-10-09 RX ADMIN — MAGNESIUM OXIDE 400 MG (241.3 MG MAGNESIUM) TABLET 400 MG: TABLET at 21:20

## 2024-10-09 RX ADMIN — DIAZEPAM 5 MG: 5 TABLET ORAL at 10:19

## 2024-10-09 RX ADMIN — BUMETANIDE 2 MG: 0.25 INJECTION INTRAMUSCULAR; INTRAVENOUS at 21:27

## 2024-10-09 RX ADMIN — SACUBITRIL AND VALSARTAN 1 TABLET: 24; 26 TABLET, FILM COATED ORAL at 10:19

## 2024-10-09 RX ADMIN — METOPROLOL SUCCINATE 25 MG: 25 TABLET, EXTENDED RELEASE ORAL at 10:19

## 2024-10-09 RX ADMIN — NITROGLYCERIN 1 INCH: 20 OINTMENT TOPICAL at 21:25

## 2024-10-09 RX ADMIN — ALLOPURINOL 100 MG: 100 TABLET ORAL at 10:20

## 2024-10-09 RX ADMIN — HYDRALAZINE HYDROCHLORIDE 50 MG: 50 TABLET ORAL at 21:19

## 2024-10-09 RX ADMIN — DEXTROSE MONOHYDRATE 125 ML: 100 INJECTION, SOLUTION INTRAVENOUS at 21:09

## 2024-10-09 RX ADMIN — ROSUVASTATIN CALCIUM 40 MG: 20 TABLET, FILM COATED ORAL at 21:20

## 2024-10-09 RX ADMIN — SERTRALINE HYDROCHLORIDE 50 MG: 50 TABLET ORAL at 10:20

## 2024-10-09 RX ADMIN — METOPROLOL SUCCINATE 25 MG: 25 TABLET, EXTENDED RELEASE ORAL at 21:20

## 2024-10-09 RX ADMIN — HYDRALAZINE HYDROCHLORIDE 50 MG: 50 TABLET ORAL at 16:37

## 2024-10-09 RX ADMIN — NITROGLYCERIN 1 INCH: 20 OINTMENT TOPICAL at 16:38

## 2024-10-09 RX ADMIN — SACUBITRIL AND VALSARTAN 1 TABLET: 24; 26 TABLET, FILM COATED ORAL at 21:20

## 2024-10-09 RX ADMIN — Medication 2000 UNITS: at 10:20

## 2024-10-09 RX ADMIN — PANTOPRAZOLE SODIUM 40 MG: 40 TABLET, DELAYED RELEASE ORAL at 06:05

## 2024-10-09 RX ADMIN — DIAZEPAM 5 MG: 5 TABLET ORAL at 21:20

## 2024-10-09 RX ADMIN — HYDROXYZINE PAMOATE 25 MG: 25 CAPSULE ORAL at 21:20

## 2024-10-09 RX ADMIN — NITROGLYCERIN 1 INCH: 20 OINTMENT TOPICAL at 06:11

## 2024-10-09 RX ADMIN — DEXTROSE MONOHYDRATE 125 ML: 100 INJECTION, SOLUTION INTRAVENOUS at 06:10

## 2024-10-09 RX ADMIN — EPOETIN ALFA-EPBX 6000 UNITS: 3000 INJECTION, SOLUTION INTRAVENOUS; SUBCUTANEOUS at 16:37

## 2024-10-09 RX ADMIN — SODIUM CHLORIDE 125 MG: 900 INJECTION INTRAVENOUS at 10:22

## 2024-10-09 RX ADMIN — PANTOPRAZOLE SODIUM 40 MG: 40 TABLET, DELAYED RELEASE ORAL at 16:37

## 2024-10-09 RX ADMIN — CALCITRIOL 0.25 MCG: 0.25 CAPSULE ORAL at 21:20

## 2024-10-09 NOTE — CARE COORDINATION
Social work / Discharge planning:         Referral called to Yodit from [facility] for OP HD chair time at the [facility] location.     Clinical information faxed.    Awaiting updates.    Will need to send tunneled catheter placement note once completed and dialysis flowsheets when available.    Electronically signed by CRISTI Kerns on 10/9/2024 at 11:48 AM

## 2024-10-09 NOTE — PROGRESS NOTES
Spoke with patient's brother Elvin.  He voices great concern for home environment and patient safety at discharge.  He explains that he sends people to help with everything at the patient's home because the patient cannot take care of himself in terms of clean environment, potentially keeping dialysis site clean, keeping up with responsibilities, and making safe judgements.  He feels the patient needs to be placed in a facility for upmost care.  RN has explained that patient PT scores will determine if he can have a insurance-covered rehab stay, and if he does not have an appropriate score then a rehab stay will require private pay and either scenario will boil down to patient choice.  Explained that if the patient does not want to go to a facilty, rehab or snf, he is of sound mind and ultimately the choice is his to make.      Elvin also lets RN know that the patient says there is a dialysis center across the street from his apartment that he feels he can walk to, which concerns him.  Elvin spoke with [facility] and relays that they will transport him but he's worried the patient won't make the calls for that.    He will be coming to visit patient and would like to speak with SW/CM to discuss options and discharge planning.

## 2024-10-09 NOTE — PLAN OF CARE
Problem: ABCDS Injury Assessment  Goal: Absence of physical injury  Outcome: Progressing     Problem: Chronic Conditions and Co-morbidities  Goal: Patient's chronic conditions and co-morbidity symptoms are monitored and maintained or improved  Outcome: Progressing     Problem: Safety - Adult  Goal: Free from fall injury  Outcome: Progressing     Problem: Pain  Goal: Verbalizes/displays adequate comfort level or baseline comfort level  Outcome: Progressing     Problem: Skin/Tissue Integrity  Goal: Absence of new skin breakdown  Description: 1.  Monitor for areas of redness and/or skin breakdown  2.  Assess vascular access sites hourly  3.  Every 4-6 hours minimum:  Change oxygen saturation probe site  4.  Every 4-6 hours:  If on nasal continuous positive airway pressure, respiratory therapy assess nares and determine need for appliance change or resting period.  Outcome: Progressing     Problem: Nutrition Deficit:  Goal: Optimize nutritional status  Outcome: Progressing

## 2024-10-09 NOTE — PROGRESS NOTES
[facility]  Nephrology Progress Note  Patient's Name: Santosh Thomas  5:49 PM  10/9/2024    Nephrologist: seen twice inpatient by [facility] in 2018 with no subsequent outpatient follow up    Reason for Consult:  GENIA  Requesting Physician:  Devaughn Reed DO    Chief Complaint:  weakness    History Obtained From:  patient, chart    History of Present Illness from the 10/6/24 note by Dr. Antoine:    Santosh Thomas is a 73 y.o. male with past medical history of longstanding type 2 diabetes now on insulin therapy, hypertension, diabetic complications including neuropathy and what sounds to be retinopathy as he follows Dr. Vazquez at [facility] and has required injections in the past, toe amputation of the left foot.  He also has history of hypertriglyce so with podiatry.  Ridemia good resolved on fenofibrate presently.  He also has some history of chronic kidney disease, likely stage IIIb based off recent labs that are available to review in the chart.  He has had multiple acute kidney injuries, our practice is seen him on 2 occurrences in 2018, once was felt to be related to rhabdomyolysis.  He did have recovery with IV fluids.  He had also had a history of NSAID use at that time.  He did not have any proteinuria at that time.  He was seen on a separate occasion in 2018 when serum creatinine peaked at 4.0 mg/dL on presentation though quickly improved to a baseline of 1.2 mg/dL.  We have not seen him since.  He has had a baseline serum creatinine of what appears to be 1.3 up to around 2.0 mg/dL over the past 1-1/2 years.  He has not seen nephrology.  Of concern, a urinalysis in January 2023 which showed a significant increase of proteinuria.    Patient presented to the hospital yesterday after being advised by his primary care physician to present.  The patient gives a history of having increased leg swelling, worsening of exertional dyspnea, weakness, fatigue that has worsened rather drastically over the past  BILIRUBINUR NEGATIVE 10/01/2024 03:05 AM    BLOODU LARGE 01/26/2023 09:23 AM    GLUCOSEU >=1000 10/01/2024 03:05 AM     HgBA1c:    Lab Results   Component Value Date/Time    LABA1C 6.6 10/01/2024 05:14 AM     Microalbumen/Creatinine ratio:  No components found for: \"RUCREAT\"  FLP:    Lab Results   Component Value Date/Time    TRIG 117 10/01/2024 05:14 AM    HDL 41 10/01/2024 05:14 AM     TSH:    Lab Results   Component Value Date/Time    TSH 1.00 10/01/2024 05:14 AM     VITAMIN B12: No components found for: \"B12\"  FOLATE:    Lab Results   Component Value Date/Time    FOLATE 13.3 10/04/2024 03:31 AM     IRON:    Lab Results   Component Value Date/Time    IRON 29 10/03/2024 03:47 AM     Iron Saturation:  No components found for: \"PERCENTFE\"  TIBC:    Lab Results   Component Value Date/Time    TIBC 227 10/03/2024 03:47 AM     FERRITIN:    Lab Results   Component Value Date/Time    FERRITIN 36 10/03/2024 03:47 AM            Assessment/Plan:    1-GENIA on CKD Stage 3b   -Baseline creatinine appears 1.5-1.9 mg/dL with GFR in mid 30s as of 9/2023  -first UA January 2023 to show significant proteinuria  -Cr up from 3.5 to 5.0mg/dl  -UPCR and UACR both to significant/heavy burden of proteinuria to even quantify on spot collection, 24 urine protein 2987mg  -Urinalysis did not show any microscopic hematuria with 0-2 WBCs and RBCs per high-power field  -His edema, history of hyperlipidemia, proteinuria and low albumin all suggestive of an underlying nephrotic syndrome the most likely cause would be diabetic nephropathy given his other diabetic comorbidities and duration of having diabetes  -serologies pending, so far C3 and C4 as well as hepatitis and HIV are negative and the SFLC panel, the ratio is normal, light chains are elevated as they are cleared by the kidneys  Hep B & C (-)  PT/INR WNL, PTT 36(ULN 35)  PLAN:  -Resume twice daily Bumex   -follow labs  -will consider biopsy pending results of workup   -Follow

## 2024-10-09 NOTE — PROGRESS NOTES
Patient seen , clinically no change, creat dropped again. Goon urine output no sob. Considering dialysis. Entresto increased and tolerating it well. Diuretics still on hold

## 2024-10-09 NOTE — PROGRESS NOTES
PROGRESS NOTE       PATIENT PROBLEM LIST:  Patient Active Problem List   Diagnosis Code    Rhabdomyolysis M62.82    Delirium due to another medical condition F05    Diabetic foot infection ([facility]) E11.628, L08.9    Osteomyelitis of great toe of left foot M86.9    DKA, type 1, not at goal ([facility]) E10.10    Diabetic acidosis without coma ([facility]) E11.10    New onset of congestive heart failure ([facility]) I50.9       SUBJECTIVE:  Santosh Thomas is sitting up at the side of his bed preparing to eat lunch with his brother sitting at his bedside presently.  He denies any shortness of breath nor chest discomfort or palpitations.  Importantly his serum creatinine has stabilized despite titration of neprilysin inhibitor.    REVIEW OF SYSTEMS:  General ROS: positive for - fatigue.-Improving somewhat  Psychological ROS: positive for - anxiety.  Ophthalmic ROS: positive for - decreased vision and utilizes corrective lenses for visual acuity.    ENT ROS: negative  Allergy and Immunology ROS: negative  Hematological and Lymphatic ROS: negative  Endocrine: no heat or cold intolerance and no polyphagia, polydipsia, or polyuria  Respiratory ROS: positive for - cough and shortness of breath-improved  Cardiovascular ROS: positive for - edema and irregular heartbeat.  Gastrointestinal ROS: no abdominal pain, change in bowel habits, or black or bloody stools  Genito-Urinary ROS: no nocturia, dysuria, trouble voiding, frequency or hematuria  Musculoskeletal ROS: negative for- myalgias, arthralgias, or claudication  Neurological ROS: no TIA or stroke symptoms otherwise no significant change in symptoms or problems since yesterday as documented in previous progress notes.    SCHEDULED MEDICATIONS:   sacubitril-valsartan  1 tablet Oral BID    epoetin funmi-epbx  6,000 Units SubCUTAneous Once per day on Monday Wednesday Friday    ferric gluconate (FERRLECIT) 125 mg in sodium chloride 0.9 % 100 mL IVPB  125 mg IntraVENous Daily    [Held by provider]

## 2024-10-09 NOTE — PROGRESS NOTES
Occupational Therapy  OT BEDSIDE TREATMENT NOTE      Date:10/9/2024  Patient Name: Santosh Thomas  MRN: [id number]  : 1950  Room: [address] [place] [address]     Evaluating OT: Dee Dee Gonzalez, OTR/SARAH - [id number]     Referring Provider: Devaughn Reed DO  Specific Provider Orders/Date: \"OT eval and treat\" - 10/1/2024     Diagnosis: Hypomagnesemia [E83.42]  New onset of congestive heart failure (HCC) [I50.9]  Acute renal failure superimposed on chronic kidney disease, unspecified acute renal failure type, unspecified CKD stage (HCC) [N17.9, N18.9]      Pertinent Medical History: dementia, depression, DM, HTN, neuropathy      Precautions: fall risk     Assessment of Current Deficits:    [x] Functional mobility             [x] ADLs          [x] Strength                  [x] Cognition   [x] Functional transfers           [x] IADLs         [x] Safety Awareness   [x] Endurance   [] Fine Motor Coordination    [x] Balance      [] Vision/Perception   [x] Sensation    [] Gross Motor Coordination [] ROM          [] Delirium                  [] Motor Control      OT PLAN OF CARE   OT POC is based on physician orders, patient diagnosis, and results of clinical assessment.  Frequency/Duration 2-5 days/week for 2-4 weeks PRN   Specific OT Treatment Interventions to Include:   * Instruction/training on adapted ADL techniques and AE recommendations to increase functional independence within precautions       * Training on energy conservation strategies, correct breathing pattern and techniques to improve independence/tolerance for self-care routine  * Functional transfer/mobility training/DME recommendations for increased independence, safety, and fall prevention  * Patient/Family education to increase follow through with safety techniques and functional independence  * Recommendation of environmental modifications for increased safety with functional transfers/mobility and ADLs  * Cognitive retraining/development of therapeutic  activities to improve problem solving, judgement, memory, and attention for increased safety/participation in ADL/IADL tasks  * Therapeutic exercise to improve motor endurance, ROM, and functional strength for ADLs/functional transfers  * Therapeutic activities to facilitate/challenge dynamic balance, stand tolerance for increased safety and independence with ADLs  * Neuro-muscular re-education: facilitation of righting/equilibrium reactions, midline orientation, scapular stability/mobility, normalization of muscle tone, and facilitation of volitional active controled movement  * Positioning to improve skin integrity, interaction with environment and functional independence     Recommended Adaptive Equipment: Shower Chair, 3 in 1     Home Living: Patient lives alone in a one-floor apartment (several steps down to access apartment from building entry); laundry facilities are adjacent to patient's apartment.  Bathroom Setup: tub shower (with grab bar)  Equipment Owned: cane     Prior Level of Function (PLOF): Patient is typically independent with ADLs, IADLs, and functional mobility (without device). Patient has had difficulty with medication management recently, per review of patient's medical record.  Driving: Yes     Pain Level: Patient denied experiencing pain.  Cognition: Patient alert and oriented x3. WFL command follow demonstrated. Patient cooperative, pleasant, and motivated to return to his PLOF.  Memory: Fair+  Sequencing: WFL grossly  Problem Solving: WFL grossly  Judgement/Safety: WFL grossly     Functional Assessment:                  AM-PAC Daily Activity Raw Score: 17/24    Initial Eval Status  Date: 10/1/2024 Treatment Status  Date: 10/9/24 Short Term Goals = Long Term Goals   Feeding Independent   N/A   Grooming SBA SBA standing  Mod I / Independent (seated/standing at sink)   UB Dressing Setup SBA to arrange gown  Mod I / Independent (including item retrieval)   LB Dressing Mod A Min A for sock

## 2024-10-10 ENCOUNTER — ANESTHESIA (OUTPATIENT)
Dept: GENERAL RADIOLOGY | Age: 74
End: 2024-10-10
Payer: MEDICARE

## 2024-10-10 ENCOUNTER — APPOINTMENT (OUTPATIENT)
Dept: ULTRASOUND IMAGING | Age: 74
DRG: 683 | End: 2024-10-10
Payer: MEDICARE

## 2024-10-10 ENCOUNTER — APPOINTMENT (OUTPATIENT)
Dept: GENERAL RADIOLOGY | Age: 74
DRG: 683 | End: 2024-10-10
Payer: MEDICARE

## 2024-10-10 ENCOUNTER — ANESTHESIA EVENT (OUTPATIENT)
Dept: GENERAL RADIOLOGY | Age: 74
End: 2024-10-10
Payer: MEDICARE

## 2024-10-10 LAB
ALBUMIN SERPL-MCNC: 2.7 G/DL (ref 3.5–5.2)
ALP SERPL-CCNC: 95 U/L (ref 40–129)
ALT SERPL-CCNC: 12 U/L (ref 0–40)
ANION GAP SERPL CALCULATED.3IONS-SCNC: 9 MMOL/L (ref 7–16)
AST SERPL-CCNC: 15 U/L (ref 0–39)
BILIRUB SERPL-MCNC: 0.2 MG/DL (ref 0–1.2)
BUN SERPL-MCNC: 46 MG/DL (ref 6–23)
CALCIUM SERPL-MCNC: 8 MG/DL (ref 8.6–10.2)
CHLORIDE SERPL-SCNC: 102 MMOL/L (ref 98–107)
CO2 SERPL-SCNC: 24 MMOL/L (ref 22–29)
CREAT SERPL-MCNC: 4.4 MG/DL (ref 0.7–1.2)
ERYTHROCYTE [DISTWIDTH] IN BLOOD BY AUTOMATED COUNT: 15.2 % (ref 11.5–15)
GFR, ESTIMATED: 14 ML/MIN/1.73M2
GLUCOSE BLD-MCNC: 156 MG/DL (ref 74–99)
GLUCOSE BLD-MCNC: 52 MG/DL (ref 74–99)
GLUCOSE BLD-MCNC: 56 MG/DL (ref 74–99)
GLUCOSE BLD-MCNC: 71 MG/DL (ref 74–99)
GLUCOSE BLD-MCNC: 86 MG/DL (ref 74–99)
GLUCOSE BLD-MCNC: 92 MG/DL (ref 74–99)
GLUCOSE BLD-MCNC: 98 MG/DL (ref 74–99)
GLUCOSE SERPL-MCNC: 84 MG/DL (ref 74–99)
HCT VFR BLD AUTO: 30.3 % (ref 37–54)
HGB BLD-MCNC: 9.9 G/DL (ref 12.5–16.5)
MAGNESIUM SERPL-MCNC: 1.7 MG/DL (ref 1.6–2.6)
MCH RBC QN AUTO: 29.7 PG (ref 26–35)
MCHC RBC AUTO-ENTMCNC: 32.7 G/DL (ref 32–34.5)
MCV RBC AUTO: 91 FL (ref 80–99.9)
PHOSPHATE SERPL-MCNC: 3.9 MG/DL (ref 2.5–4.5)
PLATELET # BLD AUTO: 214 K/UL (ref 130–450)
PMV BLD AUTO: 10 FL (ref 7–12)
POTASSIUM SERPL-SCNC: 4.3 MMOL/L (ref 3.5–5)
PROT SERPL-MCNC: 5.4 G/DL (ref 6.4–8.3)
RBC # BLD AUTO: 3.33 M/UL (ref 3.8–5.8)
SODIUM SERPL-SCNC: 135 MMOL/L (ref 132–146)
WBC OTHER # BLD: 6.9 K/UL (ref 4.5–11.5)

## 2024-10-10 PROCEDURE — 7100000010 HC PHASE II RECOVERY - FIRST 15 MIN

## 2024-10-10 PROCEDURE — 7100000011 HC PHASE II RECOVERY - ADDTL 15 MIN

## 2024-10-10 PROCEDURE — 6370000000 HC RX 637 (ALT 250 FOR IP): Performed by: STUDENT IN AN ORGANIZED HEALTH CARE EDUCATION/TRAINING PROGRAM

## 2024-10-10 PROCEDURE — 6360000002 HC RX W HCPCS: Performed by: INTERNAL MEDICINE

## 2024-10-10 PROCEDURE — 71045 X-RAY EXAM CHEST 1 VIEW: CPT

## 2024-10-10 PROCEDURE — 82962 GLUCOSE BLOOD TEST: CPT

## 2024-10-10 PROCEDURE — 6360000002 HC RX W HCPCS: Performed by: RADIOLOGY

## 2024-10-10 PROCEDURE — 6370000000 HC RX 637 (ALT 250 FOR IP): Performed by: INTERNAL MEDICINE

## 2024-10-10 PROCEDURE — 85027 COMPLETE CBC AUTOMATED: CPT

## 2024-10-10 PROCEDURE — 80053 COMPREHEN METABOLIC PANEL: CPT

## 2024-10-10 PROCEDURE — 6360000002 HC RX W HCPCS: Performed by: STUDENT IN AN ORGANIZED HEALTH CARE EDUCATION/TRAINING PROGRAM

## 2024-10-10 PROCEDURE — 6360000002 HC RX W HCPCS: Performed by: NURSE ANESTHETIST, CERTIFIED REGISTERED

## 2024-10-10 PROCEDURE — 5A1D70Z PERFORMANCE OF URINARY FILTRATION, INTERMITTENT, LESS THAN 6 HOURS PER DAY: ICD-10-PCS | Performed by: GENERAL PRACTICE

## 2024-10-10 PROCEDURE — 76937 US GUIDE VASCULAR ACCESS: CPT

## 2024-10-10 PROCEDURE — 2060000000 HC ICU INTERMEDIATE R&B

## 2024-10-10 PROCEDURE — 2580000003 HC RX 258: Performed by: RADIOLOGY

## 2024-10-10 PROCEDURE — 6370000000 HC RX 637 (ALT 250 FOR IP): Performed by: GENERAL PRACTICE

## 2024-10-10 PROCEDURE — 3700000001 HC ADD 15 MINUTES (ANESTHESIA)

## 2024-10-10 PROCEDURE — 90935 HEMODIALYSIS ONE EVALUATION: CPT

## 2024-10-10 PROCEDURE — 2580000003 HC RX 258: Performed by: GENERAL PRACTICE

## 2024-10-10 PROCEDURE — 3700000000 HC ANESTHESIA ATTENDED CARE

## 2024-10-10 PROCEDURE — 6360000002 HC RX W HCPCS

## 2024-10-10 PROCEDURE — 83735 ASSAY OF MAGNESIUM: CPT

## 2024-10-10 PROCEDURE — C1750 CATH, HEMODIALYSIS,LONG-TERM: HCPCS

## 2024-10-10 PROCEDURE — 84100 ASSAY OF PHOSPHORUS: CPT

## 2024-10-10 PROCEDURE — 93971 EXTREMITY STUDY: CPT

## 2024-10-10 PROCEDURE — 2500000003 HC RX 250 WO HCPCS: Performed by: RADIOLOGY

## 2024-10-10 PROCEDURE — 2580000003 HC RX 258: Performed by: STUDENT IN AN ORGANIZED HEALTH CARE EDUCATION/TRAINING PROGRAM

## 2024-10-10 PROCEDURE — 2500000003 HC RX 250 WO HCPCS

## 2024-10-10 PROCEDURE — 2580000003 HC RX 258: Performed by: NURSE ANESTHETIST, CERTIFIED REGISTERED

## 2024-10-10 RX ORDER — MIDAZOLAM HYDROCHLORIDE 1 MG/ML
INJECTION INTRAMUSCULAR; INTRAVENOUS
Status: DISCONTINUED | OUTPATIENT
Start: 2024-10-10 | End: 2024-10-10 | Stop reason: SDUPTHER

## 2024-10-10 RX ORDER — LIDOCAINE HYDROCHLORIDE 20 MG/ML
INJECTION, SOLUTION INFILTRATION; PERINEURAL PRN
Status: COMPLETED | OUTPATIENT
Start: 2024-10-10 | End: 2024-10-10

## 2024-10-10 RX ORDER — CEFAZOLIN SODIUM 2 G/50ML
SOLUTION INTRAVENOUS
Status: DISCONTINUED | OUTPATIENT
Start: 2024-10-10 | End: 2024-10-10 | Stop reason: SDUPTHER

## 2024-10-10 RX ORDER — MIDAZOLAM HYDROCHLORIDE 1 MG/ML
INJECTION, SOLUTION INTRAMUSCULAR; INTRAVENOUS
Status: COMPLETED
Start: 2024-10-10 | End: 2024-10-10

## 2024-10-10 RX ORDER — LABETALOL HYDROCHLORIDE 5 MG/ML
INJECTION, SOLUTION INTRAVENOUS
Status: DISCONTINUED | OUTPATIENT
Start: 2024-10-10 | End: 2024-10-10 | Stop reason: SDUPTHER

## 2024-10-10 RX ORDER — BUMETANIDE 1 MG/1
2 TABLET ORAL
Status: DISCONTINUED | OUTPATIENT
Start: 2024-10-11 | End: 2024-10-14 | Stop reason: HOSPADM

## 2024-10-10 RX ORDER — HYDRALAZINE HYDROCHLORIDE 20 MG/ML
INJECTION INTRAMUSCULAR; INTRAVENOUS
Status: DISCONTINUED | OUTPATIENT
Start: 2024-10-10 | End: 2024-10-10 | Stop reason: SDUPTHER

## 2024-10-10 RX ORDER — SODIUM CHLORIDE 9 MG/ML
INJECTION, SOLUTION INTRAVENOUS
Status: DISCONTINUED | OUTPATIENT
Start: 2024-10-10 | End: 2024-10-10 | Stop reason: SDUPTHER

## 2024-10-10 RX ADMIN — SERTRALINE HYDROCHLORIDE 50 MG: 50 TABLET ORAL at 10:16

## 2024-10-10 RX ADMIN — WATER 2000 MG: 1 INJECTION INTRAMUSCULAR; INTRAVENOUS; SUBCUTANEOUS at 14:39

## 2024-10-10 RX ADMIN — SODIUM CHLORIDE: 9 INJECTION, SOLUTION INTRAVENOUS at 14:01

## 2024-10-10 RX ADMIN — HYDRALAZINE HYDROCHLORIDE 10 MG: 20 INJECTION INTRAMUSCULAR; INTRAVENOUS at 14:15

## 2024-10-10 RX ADMIN — Medication 16 G: at 11:31

## 2024-10-10 RX ADMIN — LABETALOL HYDROCHLORIDE 5 MG: 5 INJECTION INTRAVENOUS at 14:35

## 2024-10-10 RX ADMIN — DIAZEPAM 5 MG: 5 TABLET ORAL at 20:07

## 2024-10-10 RX ADMIN — MIDAZOLAM 2 MG: 1 INJECTION INTRAMUSCULAR; INTRAVENOUS at 14:22

## 2024-10-10 RX ADMIN — SODIUM CHLORIDE 125 MG: 900 INJECTION INTRAVENOUS at 10:28

## 2024-10-10 RX ADMIN — MIDAZOLAM 1 MG: 1 INJECTION INTRAMUSCULAR; INTRAVENOUS at 14:29

## 2024-10-10 RX ADMIN — SACUBITRIL AND VALSARTAN 1 TABLET: 24; 26 TABLET, FILM COATED ORAL at 20:06

## 2024-10-10 RX ADMIN — DIAZEPAM 5 MG: 5 TABLET ORAL at 10:09

## 2024-10-10 RX ADMIN — BUMETANIDE 2 MG: 0.25 INJECTION INTRAMUSCULAR; INTRAVENOUS at 10:16

## 2024-10-10 RX ADMIN — ROSUVASTATIN CALCIUM 40 MG: 20 TABLET, FILM COATED ORAL at 20:06

## 2024-10-10 RX ADMIN — SACUBITRIL AND VALSARTAN 1 TABLET: 24; 26 TABLET, FILM COATED ORAL at 10:09

## 2024-10-10 RX ADMIN — LABETALOL HYDROCHLORIDE 5 MG: 5 INJECTION INTRAVENOUS at 14:42

## 2024-10-10 RX ADMIN — PANTOPRAZOLE SODIUM 40 MG: 40 TABLET, DELAYED RELEASE ORAL at 18:59

## 2024-10-10 RX ADMIN — ALLOPURINOL 100 MG: 100 TABLET ORAL at 20:07

## 2024-10-10 RX ADMIN — DEXTROSE MONOHYDRATE 125 ML: 100 INJECTION, SOLUTION INTRAVENOUS at 05:50

## 2024-10-10 RX ADMIN — LABETALOL HYDROCHLORIDE 5 MG: 5 INJECTION INTRAVENOUS at 14:33

## 2024-10-10 RX ADMIN — HYDRALAZINE HYDROCHLORIDE 50 MG: 50 TABLET ORAL at 05:47

## 2024-10-10 RX ADMIN — LIDOCAINE HYDROCHLORIDE 15 ML: 20 INJECTION, SOLUTION INFILTRATION; PERINEURAL at 14:42

## 2024-10-10 RX ADMIN — HYDROXYZINE PAMOATE 25 MG: 25 CAPSULE ORAL at 20:06

## 2024-10-10 RX ADMIN — HYDRALAZINE HYDROCHLORIDE 50 MG: 50 TABLET ORAL at 20:07

## 2024-10-10 RX ADMIN — MAGNESIUM OXIDE 400 MG (241.3 MG MAGNESIUM) TABLET 400 MG: TABLET at 20:06

## 2024-10-10 RX ADMIN — Medication 2000 UNITS: at 20:06

## 2024-10-10 RX ADMIN — NITROGLYCERIN 1 INCH: 20 OINTMENT TOPICAL at 05:47

## 2024-10-10 RX ADMIN — NITROGLYCERIN 1 INCH: 20 OINTMENT TOPICAL at 20:05

## 2024-10-10 RX ADMIN — METOPROLOL SUCCINATE 25 MG: 25 TABLET, EXTENDED RELEASE ORAL at 20:07

## 2024-10-10 RX ADMIN — CEFAZOLIN SODIUM 2000 MG: 2 SOLUTION INTRAVENOUS at 14:39

## 2024-10-10 RX ADMIN — METOPROLOL SUCCINATE 25 MG: 25 TABLET, EXTENDED RELEASE ORAL at 10:09

## 2024-10-10 ASSESSMENT — PAIN SCALES - WONG BAKER: WONGBAKER_NUMERICALRESPONSE: NO HURT

## 2024-10-10 ASSESSMENT — PAIN - FUNCTIONAL ASSESSMENT
PAIN_FUNCTIONAL_ASSESSMENT: 0-10

## 2024-10-10 ASSESSMENT — PAIN SCALES - GENERAL
PAINLEVEL_OUTOF10: 0
PAINLEVEL_OUTOF10: 3

## 2024-10-10 ASSESSMENT — LIFESTYLE VARIABLES: SMOKING_STATUS: 1

## 2024-10-10 NOTE — OR NURSING
Patient brought down to the IR dept for image guided insertion of a tunneled hemodialysis catheter w/ anesthesia. Dr Arrington in to speak to the patient prior to the procedure, all questions/concerns addressed. Consent signed per patient. IV flushed and checked for patency. Patient connected to monitoring and oxygen equipment. Patient positioned on fluoro table supine. Patient prepped and draped per protocol. 2G Ancef given pre-procedure. Patient medicated per anesthesia. Lidocaine administered to the procedural site. Using ultrasound and fluoro, patient scanned and images reviewed by Dr Arrington. 16 Afghan catheter placed to the right internal jugular vein. Catheter sutured in place. Both ports with blood return, flushed with saline and heparin. Patient tolerated well. Procedure completed @ (  ).  Report called to recovery. Patient transported to recovery with all belongings and accompanied per CRNA.

## 2024-10-10 NOTE — PROGRESS NOTES
PT RECEIVED IN PACU ROM IR , POST TESSIO INSERTION  RIGHT IJ, PT AWAKE DR BAKER AT BEDSIDE , CHECKED ON PT , PT WET COMPLETE LINEN CHANGE DONE X 2 EXTERNAL CATHETER RECONNECTED TO DRAINAGE SYSTEM

## 2024-10-10 NOTE — PROGRESS NOTES
Coming around okay. Renal function improved again . Tolerating meds. Bp good . No sob. Swelling at iv site, check for dvt

## 2024-10-10 NOTE — ANESTHESIA POSTPROCEDURE EVALUATION
Department of Anesthesiology  Postprocedure Note    Patient: Santosh Thomas  MRN: [id number]  YOB: 1950  Date of evaluation: 10/10/2024    Procedure Summary       Date: 10/10/24 Room / Location: [facility] Radiology    Anesthesia Start: 1401 Anesthesia Stop: 1504    Procedures:       FL TUNNELED CVC PLACE WO SQ PORT/PUMP > 5 YEARS      XR US GUIDED VASCULAR ACCESS Diagnosis:       (TDC with anesthesia)      (hd catheter)    Scheduled Providers: RadiologistHoracio General Responsible Provider: Leny Cook DO    Anesthesia Type: MAC ASA Status: 4            Anesthesia Type: MAC    Aura Phase I: Aura Score: 10    Aura Phase II: Aura Score: 10    Anesthesia Post Evaluation    Patient location during evaluation: PACU  Patient participation: complete - patient participated  Level of consciousness: awake and alert  Airway patency: patent  Nausea & Vomiting: no nausea and no vomiting  Cardiovascular status: hemodynamically stable  Respiratory status: acceptable  Hydration status: euvolemic  Pain management: adequate    No notable events documented.

## 2024-10-10 NOTE — PROGRESS NOTES
PROGRESS NOTE       PATIENT PROBLEM LIST:  Patient Active Problem List   Diagnosis Code    Rhabdomyolysis M62.82    Delirium due to another medical condition F05    Diabetic foot infection ([facility]) E11.628, L08.9    Osteomyelitis of great toe of left foot M86.9    DKA, type 1, not at goal ([facility]) E10.10    Diabetic acidosis without coma ([facility]) E11.10    New onset of congestive heart failure ([facility]) I50.9       SUBJECTIVE:  Santosh Thomas is sitting up at the side of his bed preparing to eat lunch with his brother sitting at his bedside presently.  He denies any shortness of breath nor chest discomfort or palpitations.  Importantly his serum creatinine has stabilized despite titration of neprilysin inhibitor.    REVIEW OF SYSTEMS:  General ROS: positive for - fatigue.-Improving somewhat  Psychological ROS: positive for - anxiety.  Ophthalmic ROS: positive for - decreased vision and utilizes corrective lenses for visual acuity.    ENT ROS: negative  Allergy and Immunology ROS: negative  Hematological and Lymphatic ROS: negative  Endocrine: no heat or cold intolerance and no polyphagia, polydipsia, or polyuria  Respiratory ROS: positive for - cough and shortness of breath-improved  Cardiovascular ROS: positive for - edema and irregular heartbeat.  Gastrointestinal ROS: no abdominal pain, change in bowel habits, or black or bloody stools  Genito-Urinary ROS: no nocturia, dysuria, trouble voiding, frequency or hematuria  Musculoskeletal ROS: negative for- myalgias, arthralgias, or claudication  Neurological ROS: no TIA or stroke symptoms otherwise no significant change in symptoms or problems since yesterday as documented in previous progress notes.    SCHEDULED MEDICATIONS:   sacubitril-valsartan  1 tablet Oral BID    epoetin funmi-epbx  6,000 Units SubCUTAneous Once per day on Monday Wednesday Friday    bumetanide  2 mg IntraVENous BID    calcitRIOL  0.25 mcg Oral Once per day on Monday Wednesday Friday    allopurinol  100 mg  25 minutes face to face with Santosh Thomas and reviewing notes and laboratory data, with greater than 50% of this time instructing and counseling the patient  face to face regarding my findings and recommendations and I have answered all questions as posed to me by Mr. Thomas and his brother who is at his bedside presently..    [place], DO FACP,FACC,[facility]      NOTE:  This report was transcribed using voice recognition software.  Every effort was made to ensure accuracy; however, inadvertent computerized transcription errors may be present

## 2024-10-10 NOTE — PROGRESS NOTES
Physical Therapy    Pt NA for Rx this AM per Nurse due to low blood sugar, US tech present to perform testing, Pt to have surgery in PM. Will follow on another date/time.  Korey Orozco PTA [id number]   Never smoker

## 2024-10-10 NOTE — PROGRESS NOTES
Occupational Therapy  Patient treatment attempted this AM.  Patient on hold d/t low blood sugar and ultrasound at bedside, patient going for a procedure in PM. Will continue OT POC as able and appropriate.    Triec OSCAR/SARAH [id number]

## 2024-10-10 NOTE — PROGRESS NOTES
SPOKE WITH IR NURSE AND UPDATED ON OOZING FROM RIGHT TESSIO INSERT SITE , WILL UPDATE DR AND CALL BACK   1650 IR RETURNED CALL INSTRUCTED TO CHANGE DRESSING ELEVATE HOB 45  STATED IT WAS , WAITI 20 MIN AND CALL BACK IF NOT IMPROVED  1600 DRESSING CHANGED RIGHT UPPER CHEST TESSIO STERIL TECHNIQUE PT TOLERATED WELL HANDOFF OF CARE REPORT GIVEN TO MIGUEL RN  6990 REPORT CALLED TO FLOOR RN AT THIS TIME

## 2024-10-10 NOTE — PROGRESS NOTES
past couple of weeks.  The patient relays a story of having gained about 15 to 20 pounds in a fairly short period of time.  He went to urgent care recently but no significant interventions were taken.  His primary care doctor ordered a chest x-ray which was concerning for some mild pulmonary vascular congestion and he was advised to come to the emergency room at that time.  He had been prescribed what appeared to be a short course of diuretics though that was not given as he ended up coming to the hospital.    Initial laboratory studies showed BUN 36, creatinine 3.4 mg/dL, CO2 19.  Magnesium was low at 1.3 mg/dL.  Serum calcium was low at 7.4 mg/dL with a corresponding serum albumin of 2.4 g/dL.  proBNP was significantly elevated at 34,195.  Nephrology consult was requested for acute kidney injury.        Interval History:    10/10/24: Patient seen and examined.  He was in PACU having just come from  for the placement R IJ TDC. He stated he felt very cold    Past Medical History:   Diagnosis Date    Dementia (HCC)     Depression     Diabetes mellitus (HCC)     Hypertension     Neuropathy        Past Surgical History:   Procedure Laterality Date    BACK SURGERY      FOOT DEBRIDEMENT Left 3/25/2021    BONE BIOPSY AND CULTURE, GREAT TOE LEFT FOOT performed by Velasquez Wilcox DPM at [facility] OR    TOE AMPUTATION Left 3/28/2021    LEFT FOOT TOE AMPUTATION,  LEFT FOOT PARTIAL HALLUX AMPUTATION WITH DEBRIDEMENT OF NON VIABLE TISSUE performed by Velasquez Wilcox DPM at [facility] OR       History reviewed. No pertinent family history.     reports that he has been smoking cigarettes. He has been exposed to tobacco smoke. He has never used smokeless tobacco. He reports current drug use. He reports that he does not drink alcohol.    Allergies:  Patient has no known allergies.        Current Facility-Administered Medications   Medication Dose Route Frequency Provider Last Rate Last Admin    sacubitril-valsartan (ENTRESTO) 24-26 MG  murmrur.  Abdomen: Abdomen is obese, soft, nontender the somewhat distended.  Extremeties: 3+ pitting bilateral lower extremity edema extending up to the mid thigh.      DATA:    Vascular duplex upper extremity venous left   Final Result   No evidence of DVT.      Superficial thrombus, left cephalic vein.         XR CHEST PORTABLE   Final Result   There is right middle lobe opacity concerning for pneumonia and or   atelectasis.         US RETROPERITONEAL COMPLETE   Final Result   1.  Mild bilateral renal cortical thinning.  There appears to be a small   amount of right perinephric fluid.  No hydronephrosis seen.      2.  Normal appearance of the imaged bladder.  Left ureteric jet was not able   to be demonstrated.  Right ureteric jet is present.         XR CHEST (2 VW)   Final Result   No acute process.         Vascular duplex lower extremity venous bilateral   Final Result   No evidence of DVT in either lower extremity.         FL TUNNELED CVC PLACE WO SQ PORT/PUMP > 5 YEARS    (Results Pending)   XR US GUIDED VASCULAR ACCESS    (Results Pending)   XR CHEST PORTABLE    (Results Pending)         Labs:    CBC:   Recent Labs     10/08/24  0410 10/09/24  0350 10/10/24  0622   WBC 5.7 6.8 6.9   HGB 9.1* 9.2* 9.9*    201 214          Lab Results   Component Value Date    IRON 29 (L) 10/03/2024    TIBC 227 (L) 10/03/2024    FERRITIN 36 10/03/2024       Lab Results   Component Value Date    CALCIUM 8.0 (L) 10/10/2024    CALCIUM 8.1 (L) 10/09/2024    CALCIUM 7.7 (L) 10/08/2024    CAION 1.22 01/15/2018    CAION 1.10 (L) 01/14/2018    PHOS 3.9 10/10/2024    PHOS 4.5 10/09/2024    PHOS 5.2 (H) 10/08/2024    MG 1.7 10/10/2024    MG 1.8 10/09/2024    MG 1.7 10/08/2024       BMP:   Recent Labs     10/08/24  0410 10/09/24  0350 10/10/24  0622    137 135   K 4.4 4.6 4.3    105 102   CO2 22 22 24   BUN 53* 53* 46*   CREATININE 4.9* 4.5* 4.4*   GLUCOSE 87 84 84             Labs:  CBC with Differential:    Lab  03:05 AM    UROBILINOGEN 0.2 10/01/2024 03:05 AM    BILIRUBINUR NEGATIVE 10/01/2024 03:05 AM    BLOODU LARGE 01/26/2023 09:23 AM    GLUCOSEU >=1000 10/01/2024 03:05 AM     HgBA1c:    Lab Results   Component Value Date/Time    LABA1C 6.6 10/01/2024 05:14 AM     Microalbumen/Creatinine ratio:  No components found for: \"RUCREAT\"  FLP:    Lab Results   Component Value Date/Time    TRIG 117 10/01/2024 05:14 AM    HDL 41 10/01/2024 05:14 AM     TSH:    Lab Results   Component Value Date/Time    TSH 1.00 10/01/2024 05:14 AM     VITAMIN B12: No components found for: \"B12\"  FOLATE:    Lab Results   Component Value Date/Time    FOLATE 13.3 10/04/2024 03:31 AM     IRON:    Lab Results   Component Value Date/Time    IRON 29 10/03/2024 03:47 AM     Iron Saturation:  No components found for: \"PERCENTFE\"  TIBC:    Lab Results   Component Value Date/Time    TIBC 227 10/03/2024 03:47 AM     FERRITIN:    Lab Results   Component Value Date/Time    FERRITIN 36 10/03/2024 03:47 AM            Assessment/Plan:    1-GENIA on CKD Stage 3b   -Baseline creatinine appears 1.5-1.9 mg/dL with GFR in mid 30s as of 9/2023  -first UA January 2023 to show significant proteinuria  -Cr up from 3.5 to 5.0mg/dl  -UPCR and UACR both to significant/heavy burden of proteinuria to even quantify on spot collection, 24 urine protein 2987mg  -Urinalysis did not show any microscopic hematuria with 0-2 WBCs and RBCs per high-power field  -His edema, history of hyperlipidemia, proteinuria and low albumin all suggestive of an underlying nephrotic syndrome the most likely cause would be diabetic nephropathy given his other diabetic comorbidities and duration of having diabetes  -serologies pending, so far C3 and C4 as well as hepatitis and HIV are negative and the SFLC panel, the ratio is normal, light chains are elevated as they are cleared by the kidneys  Hep B & C (-)  PT/INR WNL, PTT 36(ULN 35)  PLAN:  -Continue  twice daily Bumex   -follow labs  -will consider

## 2024-10-10 NOTE — ANESTHESIA PRE PROCEDURE
Department of Anesthesiology  Preprocedure Note       Name:  Santosh Thomas   Age:  73 y.o.  :  1950                                          MRN:  [id number]         Date:  10/10/2024      Surgeon: [facility]    Procedure: FL TUNNELED CVC WO SQ PORT US GUIDED VASCULAR ACCESS    Medications prior to admission:   Prior to Admission medications    Medication Sig Start Date End Date Taking? Authorizing Provider   Cholecalciferol 50 MCG (2000) TABS Take by mouth 24  Yes Provider, MD Ronda   diazePAM (VALIUM) 5 MG tablet Take 1 tablet by mouth 2 times daily.   Yes Provider, MD Ronda   LANTUS SOLOSTAR 100 UNIT/ML injection pen 30 Units nightly 24  Yes Provider, MD Ronda   insulin lispro, 1 Unit Dial, (HUMALOG/ADMELOG) 100 UNIT/ML SOPN 3 Units 3 times daily (with meals) Plus sliding scale 24  Yes Provider, MD Ronda   aspirin-acetaminophen-caffeine (EXCEDRIN MIGRAINE) 250-250-65 MG per tablet Take 1 tablet by mouth every 6 hours as needed for Headaches   Yes Provider, MD Ronda   azelastine (ASTELIN) 0.1 % nasal spray 1 spray by Nasal route 2 times daily Use in each nostril as directed 23  Yes Ying Nicholas APRN - CNP   ZYRTEC ALLERGY 10 MG tablet Take 1 tablet by mouth daily 23  Yes Ying Nicholas APRN - CNP   hydrALAZINE (APRESOLINE) 50 MG tablet Take 1 tablet by mouth every 8 hours 23  Yes Devaughn Reed, DO   metoprolol succinate (TOPROL XL) 25 MG extended release tablet Take 1 tablet by mouth 2 times daily 23  Yes Devaughn Reed, DO   pantoprazole (PROTONIX) 40 MG tablet Take 1 tablet by mouth 2 times daily (before meals) 23  Yes Devaughn Reed, DO   aspirin 81 MG EC tablet Take 1 tablet by mouth every morning   Yes Provider, MD Ronda   sertraline (ZOLOFT) 50 MG tablet Take 1 tablet by mouth every morning   Yes Provider, MD Ronda   cloNIDine (CATAPRES) 0.2 MG tablet Take 1 tablet by mouth 2 times daily 18  Yes

## 2024-10-10 NOTE — CARE COORDINATION
CM met with patient, patients brother, Elvin and jareth from [place] and patients ex wife, Latonia at bedside. Plan for TDC placement and IHD today. SW made referral and [facility] note and HD flowsheets will need  faxed when available. PT/16 OT/17 walked 210 ft with ww SBA, family requested therapy include steps in eval, patient has 6 steps down to 1st floor apt. CM notified PT/OT. Patient now agreeable to C, freedom of choice list provided, referral made to Cornelius with [facility] [place], left message, await to hear back. HHC orders completed. Family voiced concern about patient not being able to cook meals, patient states he makes frozen dinners. [facility] resources provided along with Private Pay Aide list. Will continue to follow.  Brooke KUMARIN, RN  Case Management

## 2024-10-11 LAB
ALBUMIN SERPL-MCNC: 2.6 G/DL (ref 3.5–5.2)
ALP SERPL-CCNC: 92 U/L (ref 40–129)
ALT SERPL-CCNC: 9 U/L (ref 0–40)
ANION GAP SERPL CALCULATED.3IONS-SCNC: 9 MMOL/L (ref 7–16)
AST SERPL-CCNC: 15 U/L (ref 0–39)
BILIRUB SERPL-MCNC: 0.2 MG/DL (ref 0–1.2)
BUN SERPL-MCNC: 30 MG/DL (ref 6–23)
CALCIUM SERPL-MCNC: 8.2 MG/DL (ref 8.6–10.2)
CHLORIDE SERPL-SCNC: 102 MMOL/L (ref 98–107)
CO2 SERPL-SCNC: 25 MMOL/L (ref 22–29)
CREAT SERPL-MCNC: 3.4 MG/DL (ref 0.7–1.2)
ERYTHROCYTE [DISTWIDTH] IN BLOOD BY AUTOMATED COUNT: 15.2 % (ref 11.5–15)
GFR, ESTIMATED: 19 ML/MIN/1.73M2
GLUCOSE BLD-MCNC: 141 MG/DL (ref 74–99)
GLUCOSE BLD-MCNC: 152 MG/DL (ref 74–99)
GLUCOSE BLD-MCNC: 226 MG/DL (ref 74–99)
GLUCOSE BLD-MCNC: 226 MG/DL (ref 74–99)
GLUCOSE SERPL-MCNC: 135 MG/DL (ref 74–99)
HCT VFR BLD AUTO: 31.1 % (ref 37–54)
HGB BLD-MCNC: 9.8 G/DL (ref 12.5–16.5)
MAGNESIUM SERPL-MCNC: 1.6 MG/DL (ref 1.6–2.6)
MCH RBC QN AUTO: 29.3 PG (ref 26–35)
MCHC RBC AUTO-ENTMCNC: 31.5 G/DL (ref 32–34.5)
MCV RBC AUTO: 92.8 FL (ref 80–99.9)
PHOSPHATE SERPL-MCNC: 3.5 MG/DL (ref 2.5–4.5)
PLATELET # BLD AUTO: 230 K/UL (ref 130–450)
PMV BLD AUTO: 10.4 FL (ref 7–12)
POTASSIUM SERPL-SCNC: 4.3 MMOL/L (ref 3.5–5)
PROT SERPL-MCNC: 5.4 G/DL (ref 6.4–8.3)
RBC # BLD AUTO: 3.35 M/UL (ref 3.8–5.8)
SODIUM SERPL-SCNC: 136 MMOL/L (ref 132–146)
WBC OTHER # BLD: 7.7 K/UL (ref 4.5–11.5)

## 2024-10-11 PROCEDURE — 6370000000 HC RX 637 (ALT 250 FOR IP): Performed by: INTERNAL MEDICINE

## 2024-10-11 PROCEDURE — 82962 GLUCOSE BLOOD TEST: CPT

## 2024-10-11 PROCEDURE — 90935 HEMODIALYSIS ONE EVALUATION: CPT

## 2024-10-11 PROCEDURE — 84100 ASSAY OF PHOSPHORUS: CPT

## 2024-10-11 PROCEDURE — 6370000000 HC RX 637 (ALT 250 FOR IP): Performed by: GENERAL PRACTICE

## 2024-10-11 PROCEDURE — 83735 ASSAY OF MAGNESIUM: CPT

## 2024-10-11 PROCEDURE — 80053 COMPREHEN METABOLIC PANEL: CPT

## 2024-10-11 PROCEDURE — 6370000000 HC RX 637 (ALT 250 FOR IP): Performed by: STUDENT IN AN ORGANIZED HEALTH CARE EDUCATION/TRAINING PROGRAM

## 2024-10-11 PROCEDURE — 6360000002 HC RX W HCPCS: Performed by: INTERNAL MEDICINE

## 2024-10-11 PROCEDURE — 85027 COMPLETE CBC AUTOMATED: CPT

## 2024-10-11 PROCEDURE — 2060000000 HC ICU INTERMEDIATE R&B

## 2024-10-11 RX ADMIN — DIAZEPAM 5 MG: 5 TABLET ORAL at 20:50

## 2024-10-11 RX ADMIN — PANTOPRAZOLE SODIUM 40 MG: 40 TABLET, DELAYED RELEASE ORAL at 06:33

## 2024-10-11 RX ADMIN — SACUBITRIL AND VALSARTAN 1 TABLET: 24; 26 TABLET, FILM COATED ORAL at 20:50

## 2024-10-11 RX ADMIN — MAGNESIUM OXIDE 400 MG (241.3 MG MAGNESIUM) TABLET 400 MG: TABLET at 20:51

## 2024-10-11 RX ADMIN — MAGNESIUM OXIDE 400 MG (241.3 MG MAGNESIUM) TABLET 400 MG: TABLET at 11:24

## 2024-10-11 RX ADMIN — NITROGLYCERIN 1 INCH: 20 OINTMENT TOPICAL at 20:50

## 2024-10-11 RX ADMIN — EPOETIN ALFA-EPBX 6000 UNITS: 3000 INJECTION, SOLUTION INTRAVENOUS; SUBCUTANEOUS at 16:29

## 2024-10-11 RX ADMIN — ALLOPURINOL 100 MG: 100 TABLET ORAL at 11:25

## 2024-10-11 RX ADMIN — PANTOPRAZOLE SODIUM 40 MG: 40 TABLET, DELAYED RELEASE ORAL at 14:28

## 2024-10-11 RX ADMIN — CALCITRIOL 0.25 MCG: 0.25 CAPSULE ORAL at 20:50

## 2024-10-11 RX ADMIN — SACUBITRIL AND VALSARTAN 1 TABLET: 24; 26 TABLET, FILM COATED ORAL at 11:28

## 2024-10-11 RX ADMIN — HYDROXYZINE PAMOATE 25 MG: 25 CAPSULE ORAL at 20:51

## 2024-10-11 RX ADMIN — METOPROLOL SUCCINATE 25 MG: 25 TABLET, EXTENDED RELEASE ORAL at 11:25

## 2024-10-11 RX ADMIN — HYDRALAZINE HYDROCHLORIDE 50 MG: 50 TABLET ORAL at 20:51

## 2024-10-11 RX ADMIN — DIAZEPAM 5 MG: 5 TABLET ORAL at 11:25

## 2024-10-11 RX ADMIN — BUMETANIDE 2 MG: 1 TABLET ORAL at 14:28

## 2024-10-11 RX ADMIN — METOPROLOL SUCCINATE 25 MG: 25 TABLET, EXTENDED RELEASE ORAL at 20:50

## 2024-10-11 RX ADMIN — BUMETANIDE 2 MG: 1 TABLET ORAL at 06:33

## 2024-10-11 RX ADMIN — HYDRALAZINE HYDROCHLORIDE 50 MG: 50 TABLET ORAL at 14:28

## 2024-10-11 RX ADMIN — HYDRALAZINE HYDROCHLORIDE 50 MG: 50 TABLET ORAL at 06:33

## 2024-10-11 RX ADMIN — NITROGLYCERIN 1 INCH: 20 OINTMENT TOPICAL at 06:40

## 2024-10-11 RX ADMIN — ROSUVASTATIN CALCIUM 40 MG: 20 TABLET, FILM COATED ORAL at 20:50

## 2024-10-11 RX ADMIN — SERTRALINE HYDROCHLORIDE 50 MG: 50 TABLET ORAL at 11:25

## 2024-10-11 RX ADMIN — NITROGLYCERIN 1 INCH: 20 OINTMENT TOPICAL at 14:16

## 2024-10-11 RX ADMIN — Medication 2000 UNITS: at 11:25

## 2024-10-11 ASSESSMENT — PAIN SCALES - GENERAL
PAINLEVEL_OUTOF10: 0

## 2024-10-11 ASSESSMENT — PAIN SCALES - WONG BAKER: WONGBAKER_NUMERICALRESPONSE: NO HURT

## 2024-10-11 NOTE — PROGRESS NOTES
Patient seen doing okay. Tolerating dialysis. No sob or chest pain. Peripheral edema improving. Bp stable. Continue with current care

## 2024-10-11 NOTE — CARE COORDINATION
S/p TDC placement and 1st IHD 10/10/24. TDC placement note and HD flowsheets faxed to Yodit at [facility]. Patients chair time is [facility] T/Th/S at 10:35am. Please notify when patient discharges [phone]. CM left mesg with Parius from [facility] with chair time info. Will continue to follow.  Brooke KUMARIN, RN  Case Management

## 2024-10-11 NOTE — PROGRESS NOTES
Occupational Therapy  Pt laying in the bed.  States he is very tired after having dialysis earlier today.  Declined therapy intervention this PM.    Johanny HERBERT/SARAH [id number]

## 2024-10-12 LAB
ALBUMIN SERPL-MCNC: 2.7 G/DL (ref 3.5–5.2)
ALP SERPL-CCNC: 95 U/L (ref 40–129)
ALT SERPL-CCNC: 8 U/L (ref 0–40)
ANION GAP SERPL CALCULATED.3IONS-SCNC: 7 MMOL/L (ref 7–16)
AST SERPL-CCNC: 16 U/L (ref 0–39)
BILIRUB SERPL-MCNC: 0.3 MG/DL (ref 0–1.2)
BUN SERPL-MCNC: 23 MG/DL (ref 6–23)
CALCIUM SERPL-MCNC: 7.9 MG/DL (ref 8.6–10.2)
CHLORIDE SERPL-SCNC: 104 MMOL/L (ref 98–107)
CO2 SERPL-SCNC: 27 MMOL/L (ref 22–29)
CREAT SERPL-MCNC: 3 MG/DL (ref 0.7–1.2)
ERYTHROCYTE [DISTWIDTH] IN BLOOD BY AUTOMATED COUNT: 15.1 % (ref 11.5–15)
GFR, ESTIMATED: 22 ML/MIN/1.73M2
GLUCOSE BLD-MCNC: 112 MG/DL (ref 74–99)
GLUCOSE BLD-MCNC: 288 MG/DL (ref 74–99)
GLUCOSE BLD-MCNC: 291 MG/DL (ref 74–99)
GLUCOSE SERPL-MCNC: 168 MG/DL (ref 74–99)
HCT VFR BLD AUTO: 28.6 % (ref 37–54)
HGB BLD-MCNC: 9.1 G/DL (ref 12.5–16.5)
MAGNESIUM SERPL-MCNC: 1.7 MG/DL (ref 1.6–2.6)
MCH RBC QN AUTO: 29.4 PG (ref 26–35)
MCHC RBC AUTO-ENTMCNC: 31.8 G/DL (ref 32–34.5)
MCV RBC AUTO: 92.3 FL (ref 80–99.9)
PHOSPHATE SERPL-MCNC: 2.7 MG/DL (ref 2.5–4.5)
PLATELET # BLD AUTO: 198 K/UL (ref 130–450)
PMV BLD AUTO: 9.6 FL (ref 7–12)
POTASSIUM SERPL-SCNC: 4.3 MMOL/L (ref 3.5–5)
PROT SERPL-MCNC: 5.2 G/DL (ref 6.4–8.3)
RBC # BLD AUTO: 3.1 M/UL (ref 3.8–5.8)
SODIUM SERPL-SCNC: 138 MMOL/L (ref 132–146)
WBC OTHER # BLD: 6.1 K/UL (ref 4.5–11.5)

## 2024-10-12 PROCEDURE — 6370000000 HC RX 637 (ALT 250 FOR IP): Performed by: INTERNAL MEDICINE

## 2024-10-12 PROCEDURE — 85027 COMPLETE CBC AUTOMATED: CPT

## 2024-10-12 PROCEDURE — 80053 COMPREHEN METABOLIC PANEL: CPT

## 2024-10-12 PROCEDURE — 6370000000 HC RX 637 (ALT 250 FOR IP): Performed by: STUDENT IN AN ORGANIZED HEALTH CARE EDUCATION/TRAINING PROGRAM

## 2024-10-12 PROCEDURE — 90935 HEMODIALYSIS ONE EVALUATION: CPT

## 2024-10-12 PROCEDURE — 83735 ASSAY OF MAGNESIUM: CPT

## 2024-10-12 PROCEDURE — 2060000000 HC ICU INTERMEDIATE R&B

## 2024-10-12 PROCEDURE — 84100 ASSAY OF PHOSPHORUS: CPT

## 2024-10-12 PROCEDURE — 6370000000 HC RX 637 (ALT 250 FOR IP): Performed by: GENERAL PRACTICE

## 2024-10-12 PROCEDURE — 82962 GLUCOSE BLOOD TEST: CPT

## 2024-10-12 RX ADMIN — DIAZEPAM 5 MG: 5 TABLET ORAL at 11:34

## 2024-10-12 RX ADMIN — PANTOPRAZOLE SODIUM 40 MG: 40 TABLET, DELAYED RELEASE ORAL at 05:22

## 2024-10-12 RX ADMIN — NITROGLYCERIN 1 INCH: 20 OINTMENT TOPICAL at 15:03

## 2024-10-12 RX ADMIN — HYDROXYZINE PAMOATE 25 MG: 25 CAPSULE ORAL at 20:32

## 2024-10-12 RX ADMIN — MAGNESIUM OXIDE 400 MG (241.3 MG MAGNESIUM) TABLET 400 MG: TABLET at 11:34

## 2024-10-12 RX ADMIN — MAGNESIUM OXIDE 400 MG (241.3 MG MAGNESIUM) TABLET 400 MG: TABLET at 20:33

## 2024-10-12 RX ADMIN — ALLOPURINOL 100 MG: 100 TABLET ORAL at 11:34

## 2024-10-12 RX ADMIN — SACUBITRIL AND VALSARTAN 1 TABLET: 24; 26 TABLET, FILM COATED ORAL at 11:34

## 2024-10-12 RX ADMIN — HYDRALAZINE HYDROCHLORIDE 50 MG: 50 TABLET ORAL at 15:02

## 2024-10-12 RX ADMIN — ROSUVASTATIN CALCIUM 40 MG: 20 TABLET, FILM COATED ORAL at 20:32

## 2024-10-12 RX ADMIN — SERTRALINE HYDROCHLORIDE 50 MG: 50 TABLET ORAL at 11:34

## 2024-10-12 RX ADMIN — Medication 2000 UNITS: at 11:34

## 2024-10-12 RX ADMIN — HYDRALAZINE HYDROCHLORIDE 50 MG: 50 TABLET ORAL at 20:32

## 2024-10-12 RX ADMIN — METOPROLOL SUCCINATE 25 MG: 25 TABLET, EXTENDED RELEASE ORAL at 11:34

## 2024-10-12 RX ADMIN — SACUBITRIL AND VALSARTAN 1 TABLET: 24; 26 TABLET, FILM COATED ORAL at 20:32

## 2024-10-12 RX ADMIN — DIAZEPAM 5 MG: 5 TABLET ORAL at 20:32

## 2024-10-12 RX ADMIN — NITROGLYCERIN 1 INCH: 20 OINTMENT TOPICAL at 05:22

## 2024-10-12 RX ADMIN — INSULIN LISPRO 2 UNITS: 100 INJECTION, SOLUTION INTRAVENOUS; SUBCUTANEOUS at 16:21

## 2024-10-12 RX ADMIN — BUMETANIDE 2 MG: 1 TABLET ORAL at 15:02

## 2024-10-12 RX ADMIN — BUMETANIDE 2 MG: 1 TABLET ORAL at 05:22

## 2024-10-12 RX ADMIN — METOPROLOL SUCCINATE 25 MG: 25 TABLET, EXTENDED RELEASE ORAL at 20:32

## 2024-10-12 RX ADMIN — HYDRALAZINE HYDROCHLORIDE 50 MG: 50 TABLET ORAL at 05:22

## 2024-10-12 RX ADMIN — PANTOPRAZOLE SODIUM 40 MG: 40 TABLET, DELAYED RELEASE ORAL at 15:02

## 2024-10-12 RX ADMIN — NITROGLYCERIN 1 INCH: 20 OINTMENT TOPICAL at 20:32

## 2024-10-12 ASSESSMENT — PAIN SCALES - GENERAL: PAINLEVEL_OUTOF10: 0

## 2024-10-12 ASSESSMENT — PAIN SCALES - WONG BAKER: WONGBAKER_NUMERICALRESPONSE: NO HURT

## 2024-10-12 NOTE — FLOWSHEET NOTE
10/10/24 1848   Vital Signs   BP (!) 170/89   Temp 98.9 °F (37.2 °C)   Pulse 70   Respirations 18   Weight - Scale   (cart)   Pain Assessment   Pain Assessment None - Denies Pain   Post-Hemodialysis Assessment   Post-Treatment Procedures Blood returned;Catheter capped, clamped and heparinized x 2 ports   Machine Disinfection Process Acid/Vinegar Clean;Heat Disinfect;Exterior Machine Disinfection   Rinseback Volume (ml) 300 ml   Blood Volume Processed (Liters) 23.4 L   Dialyzer Clearance Lightly streaked   Duration of Treatment (minutes) 120 minutes   Hemodialysis Intake (ml) 300 ml   Hemodialysis Output (ml) 900 ml   NET Removed (ml) 600   Tolerated Treatment Good   Patient Response to Treatment tolerated first hd tx well, 600 net removed, catheter clamped and capped, stable at dc. post report to Richa Parrish RN   Bilateral Breath Sounds Diminished   Edema Right lower extremity;Left lower extremity   RLE Edema +2;Pitting   LLE Edema +2;Pitting   Time Off 1843   Patient Disposition Return to room   Observations & Evaluations   Level of Consciousness 0   Oriented X 3   Heart Rhythm Regular   Respiratory Quality/Effort Unlabored   O2 Device None (Room air)   Skin Color Pink   Skin Condition/Temp Dry;Warm       
   10/11/24 1025   Vital Signs   BP (!) 174/91   Temp 98.4 °F (36.9 °C)   Pulse 74   Respirations 16   Weight - Scale 79.3 kg (174 lb 13.2 oz)  (matress control unit removed)   Weight Method Bed scale   Percent Weight Change -11.89   Pain Assessment   Pain Assessment None - Denies Pain   Post-Hemodialysis Assessment   Post-Treatment Procedures Blood returned;Catheter capped, clamped with Citrate x 2 ports   Machine Disinfection Process Exterior Machine Disinfection   Blood Volume Processed (Liters) 36.4 L   Dialyzer Clearance Clear   Duration of Treatment (minutes) 150 minutes   Hemodialysis Intake (ml) 300 ml   Hemodialysis Output (ml) 800 ml   NET Removed (ml) 500   Tolerated Treatment Good   Bilateral Breath Sounds Clear;Diminished   Edema Right upper extremity;Left upper extremity;Right lower extremity;Left lower extremity   RUE Edema +1   LUE Edema +1   RLE Edema +2;Pitting   LLE Edema +2;Pitting   Time Off 1010   Patient Disposition Return to room   Observations & Evaluations   Level of Consciousness 0   Heart Rhythm Regular   Respiratory Quality/Effort Unlabored   O2 Device None (Room air)   Skin Color Pink   Skin Condition/Temp Dry;Warm   Abdomen Inspection Soft   Bowel Sounds (All Quadrants) Active       
After review by Dr. Arrington, it was determined that patient needs anesthesia for insertion of tunneled HD catheter. Message left with anesthesia to schedule a time for procedure, awaiting call back.  
Patient scheduled for tunneled HD catheter with anesthesia on 10/10 at 1:00pm. Patient needs to be NPO after midnight and aspirin/anticoagulants need held until after line placed. Patient's nurse notified.  
Pt completed 3 hrs of a HD on a 3K bath with 1L of UF removed safely.   10/12/24 1035   Vital Signs   BP (!) 173/88   Temp 98 °F (36.7 °C)   Pulse 72   Respirations 18   Weight - Scale 78.3 kg (172 lb 9.9 oz)   Weight Method Bed scale   Percent Weight Change -1.26   Pain Assessment   Pain Assessment None - Denies Pain   Pain Level 0   Post-Hemodialysis Assessment   Post-Treatment Procedures Blood returned;Catheter capped, clamped with Citrate x 2 ports   Machine Disinfection Process Acid/Vinegar Clean;Heat Disinfect;Exterior Machine Disinfection   Rinseback Volume (ml) 300 ml   Blood Volume Processed (Liters) 52.5 L   Dialyzer Clearance Lightly streaked   Duration of Treatment (minutes) 180 minutes   Hemodialysis Intake (ml) 300 ml   Hemodialysis Output (ml) 1300 ml   NET Removed (ml) 1000   Patient Response to Treatment tolerated well; cath care per [facility]  policy; closed with citrate; Dr. Strong rounded on tx; post report to Marylu AGUILERA   Bilateral Breath Sounds Clear   Edema Generalized   Time Off 1032   Patient Disposition Return to room   Observations & Evaluations   Level of Consciousness 0   Oriented X 3   Heart Rhythm Regular   Respiratory Quality/Effort Unlabored   O2 Device None (Room air)   Skin Color Pink   Skin Condition/Temp Dry;Warm   Abdomen Inspection Soft   Bowel Sounds (All Quadrants) Active       
  Dressing/Treatment Pharmaceutical agent (see MAR)  (Aquaphor)   Wound Length (cm) 3 cm   Wound Width (cm) 2 cm   Wound Depth (cm) 0.1 cm   Wound Surface Area (cm^2) 6 cm^2   Wound Volume (cm^3) 0.6 cm^3   Wound Assessment Pink/red;Ruptured blister   Drainage Amount Scant (moist but unmeasurable)   Drainage Description Serosanguinous   Odor None   Malgorzata-wound Assessment Blanchable erythema       **Informed Consent**    The patient has given verbal consent to have photos taken of wound and inserted into their chart as part of their permanent medical record for purposes of documentation, treatment management and/or medical review.   All Images taken on 10/8/24 of patient name: Santosh Thomas were transmitted and stored on secured Epic  Site located within Media Folder Tab by a registered Epic-[employer] Mobile Application Device.     Plan: Aquaphor to bilateral buttocks  Comfort glide  Wedges  Heel protectors  Low air loss pump - asked charge nurse to put low air loss pump on patient's bed if one is available, if one is not please order low air loss bed  Chair waffle cushion  Dietary consult  Patient will need continued preventative care    Nora Solis RN 10/8/2024 2:06 PM

## 2024-10-12 NOTE — PROGRESS NOTES
[facility]  Nephrology Progress Note  Patient's Name: Santosh Thomas  12:53 PM  10/12/2024    Nephrologist: seen twice inpatient by [facility] in 2018 with no subsequent outpatient follow up    Reason for Consult:  GENIA  Requesting Physician:  Devaughn Reed DO    Chief Complaint:  weakness    History Obtained From:  patient, chart    History of Present Illness from the 10/6/24 note by Dr. Antoine:    Santosh Thomas is a 74 y.o. male with past medical history of longstanding type 2 diabetes now on insulin therapy, hypertension, diabetic complications including neuropathy and what sounds to be retinopathy as he follows Dr. Vazquez at [facility] and has required injections in the past, toe amputation of the left foot.  He also has history of hypertriglyce so with podiatry.  Ridemia good resolved on fenofibrate presently.  He also has some history of chronic kidney disease, likely stage IIIb based off recent labs that are available to review in the chart.  He has had multiple acute kidney injuries, our practice is seen him on 2 occurrences in 2018, once was felt to be related to rhabdomyolysis.  He did have recovery with IV fluids.  He had also had a history of NSAID use at that time.  He did not have any proteinuria at that time.  He was seen on a separate occasion in 2018 when serum creatinine peaked at 4.0 mg/dL on presentation though quickly improved to a baseline of 1.2 mg/dL.  We have not seen him since.  He has had a baseline serum creatinine of what appears to be 1.3 up to around 2.0 mg/dL over the past 1-1/2 years.  He has not seen nephrology.  Of concern, a urinalysis in January 2023 which showed a significant increase of proteinuria.    Patient presented to the hospital yesterday after being advised by his primary care physician to present.  The patient gives a history of having increased leg swelling, worsening of exertional dyspnea, weakness, fatigue that has worsened rather drastically over the  allergies.        Current Facility-Administered Medications   Medication Dose Route Frequency Provider Last Rate Last Admin    bumetanide (BUMEX) tablet 2 mg  2 mg Oral BID AC Sally Strong MD   2 mg at 10/12/24 0522    sacubitril-valsartan (ENTRESTO) 24-26 MG per tablet 1 tablet  1 tablet Oral BID aSlly Strong MD   1 tablet at 10/12/24 1134    epoetin funmi-epbx (RETACRIT) injection 6,000 Units  6,000 Units SubCUTAneous Once per day on Monday Wednesday Friday Sally Strong MD   6,000 Units at 10/11/24 1629    white petrolatum ointment   Topical BID PRN Devaughn Reed DO        albuterol (PROVENTIL) (2.5 MG/3ML) 0.083% nebulizer solution 2.5 mg  2.5 mg Nebulization Q4H PRN Devaughn Reed DO   2.5 mg at 10/04/24 0318    calcitRIOL (ROCALTROL) capsule 0.25 mcg  0.25 mcg Oral Once per day on Monday Wednesday Friday Dax Crowder MD   0.25 mcg at 10/11/24 2050    allopurinol (ZYLOPRIM) tablet 100 mg  100 mg Oral Daily Dax Crowder MD   100 mg at 10/12/24 1134    nitroglycerin (NITRO-BID) 2 % ointment 1 inch  1 inch Topical 3 times per day Rancho Boone DO   1 inch at 10/12/24 0522    acetaminophen (TYLENOL) tablet 650 mg  650 mg Oral Q4H PRN Devaughn Reed DO   650 mg at 10/06/24 2141    [Held by provider] aspirin EC tablet 81 mg  81 mg Oral QAM Devaughn Reed DO   81 mg at 10/08/24 0947    vitamin D (CHOLECALCIFEROL) tablet 2,000 Units  2,000 Units Oral Daily Devaughn Reed DO   2,000 Units at 10/12/24 1134    diazePAM (VALIUM) tablet 5 mg  5 mg Oral BID Devaughn Reed DO   5 mg at 10/12/24 1134    [Held by provider] fenofibrate tablet 160 mg  160 mg Oral Daily Devaughn Reed DO        hydrALAZINE (APRESOLINE) tablet 50 mg  50 mg Oral 3 times per day Rancho Boone DO   50 mg at 10/12/24 0522    hydrOXYzine pamoate (VISTARIL) capsule 25 mg  25 mg Oral Nightly Devaughn Reed DO   25 mg at 10/11/24 2051    magnesium oxide (MAG-OX) tablet 400 mg  400 mg Oral BID Devaughn Reed DO

## 2024-10-12 NOTE — PROGRESS NOTES
PROGRESS NOTE       PATIENT PROBLEM LIST:  Patient Active Problem List   Diagnosis Code    Rhabdomyolysis M62.82    Delirium due to another medical condition F05    Diabetic foot infection ([facility]) E11.628, L08.9    Osteomyelitis of great toe of left foot M86.9    DKA, type 1, not at goal ([facility]) E10.10    Diabetic acidosis without coma ([facility]) E11.10    New onset of congestive heart failure ([facility]) I50.9       SUBJECTIVE:  Santosh Thomas is sitting up at the side of his bed preparing to eat lunch with his brother sitting at his bedside presently.  He denies any shortness of breath nor chest discomfort or palpitations.  Importantly his serum creatinine has stabilized despite titration of neprilysin inhibitor.    REVIEW OF SYSTEMS:  General ROS: positive for - fatigue.-Improving somewhat  Psychological ROS: positive for - anxiety.  Ophthalmic ROS: positive for - decreased vision and utilizes corrective lenses for visual acuity.    ENT ROS: negative  Allergy and Immunology ROS: negative  Hematological and Lymphatic ROS: negative  Endocrine: no heat or cold intolerance and no polyphagia, polydipsia, or polyuria  Respiratory ROS: positive for - cough and shortness of breath-improved  Cardiovascular ROS: positive for - edema and irregular heartbeat.  Gastrointestinal ROS: no abdominal pain, change in bowel habits, or black or bloody stools  Genito-Urinary ROS: no nocturia, dysuria, trouble voiding, frequency or hematuria  Musculoskeletal ROS: negative for- myalgias, arthralgias, or claudication  Neurological ROS: no TIA or stroke symptoms otherwise no significant change in symptoms or problems since yesterday as documented in previous progress notes.    SCHEDULED MEDICATIONS:   bumetanide  2 mg Oral BID AC    sacubitril-valsartan  1 tablet Oral BID    epoetin funmi-epbx  6,000 Units SubCUTAneous Once per day on Monday Wednesday Friday    calcitRIOL  0.25 mcg Oral Once per day on Monday Wednesday Friday    allopurinol  100 mg  Oral Daily    nitroglycerin  1 inch Topical 3 times per day    [Held by provider] aspirin  81 mg Oral QAM    vitamin D  2,000 Units Oral Daily    diazePAM  5 mg Oral BID    [Held by provider] fenofibrate  160 mg Oral Daily    hydrALAZINE  50 mg Oral 3 times per day    hydrOXYzine pamoate  25 mg Oral Nightly    magnesium oxide  400 mg Oral BID    metoprolol succinate  25 mg Oral BID    pantoprazole  40 mg Oral BID AC    rosuvastatin  40 mg Oral Nightly    sertraline  50 mg Oral QAM    insulin glargine  30 Units SubCUTAneous Nightly    insulin lispro  0-4 Units SubCUTAneous TID WC    insulin lispro  0-4 Units SubCUTAneous Nightly       VITAL SIGNS:                                                                                                                          BP (!) 119/59   Pulse 87   Temp 98.6 °F (37 °C) (Oral)   Resp 16   Ht 1.676 m (5' 6\")   Wt 78.3 kg (172 lb 9.9 oz)   SpO2 100%   BMI 27.86 kg/m²   Patient Vitals for the past 96 hrs (Last 3 readings):   Weight   10/12/24 1035 78.3 kg (172 lb 9.9 oz)   10/11/24 1025 79.3 kg (174 lb 13.2 oz)   10/10/24 0430 90 kg (198 lb 6.6 oz)       OBJECTIVE:    HEENT: PERRL, EOM  Intact; sclera non-icteric, conjunctiva pink. Carotids are brisk in upstroke with normal contour. No carotid bruits. Normal jugular venous pulsation at 45°. No palpable cervical nor supraclavicular nodes.Thyroid not palpable. Trachea midline.  Chest: Even excursion  Lungs: Grossly clear to auscultation bilaterally, no expiratory wheezes or rhonchi, no decreased tactile fremitus without inspiratory rales.  Heart: Regular  rhythm; S1 > S2, no gallop or murmur. No clicks, rub, palpable thrills   or heaves. PMI nondisplaced, 5th intercostal space MCL.   Abdomen: Soft, nontender, nondistended,  moderately protuberant, no masses or organomegaly.  Bowel sounds active.  Extremities: Without clubbing, cyanosis 2+ bilateral pretibial edema. Pulses present 3+ upper extermities bilaterally; barely  palpable DP  bilaterally and barely palpable PT bilaterally.     Data:   Scheduled Meds: Reviewed  Continuous Infusions:    dextrose         Intake/Output Summary (Last 24 hours) at 10/12/2024 1657  Last data filed at 10/12/2024 1035  Gross per 24 hour   Intake 300 ml   Output 2050 ml   Net -1750 ml     CBC:   Recent Labs     10/10/24  0622 10/11/24  0305 10/12/24  0333   WBC 6.9 7.7 6.1   HGB 9.9* 9.8* 9.1*   HCT 30.3* 31.1* 28.6*    230 198       BMP:  Recent Labs     10/10/24  0622 10/11/24  0305 10/12/24  0333    136 138   K 4.3 4.3 4.3    102 104   CO2 24 25 27   BUN 46* 30* 23   CREATININE 4.4* 3.4* 3.0*   LABGLOM 14* 19* 22*     ABGs: No results found for: \"PH\", \"PO2\", \"PCO2\"  INR:   No results for input(s): \"INR\" in the last 72 hours.    PRO-BNP:   Lab Results   Component Value Date    PROBNP 34,195 (H) 09/30/2024    PROBNP 302 (H) 09/07/2019      TSH:   Lab Results   Component Value Date    TSH 1.00 10/01/2024      Cardiac Injury Profile: No results for input(s): \"TROPHS\" in the last 72 hours.   Lipid Profile:   Lab Results   Component Value Date/Time    TRIG 117 10/01/2024 05:14 AM    HDL 41 10/01/2024 05:14 AM    CHOL 159 10/01/2024 05:14 AM      Hemoglobin A1C: No components found for: \"HGBA1C\"      RAD:   XR CHEST PORTABLE    Result Date: 10/3/2024  There is right middle lobe opacity concerning for pneumonia and or atelectasis.         EKG: See Report  Echo: See Report      IMPRESSIONS:  Patient Active Problem List   Diagnosis Code    Rhabdomyolysis M62.82    Delirium due to another medical condition F05    Diabetic foot infection ([facility]) E11.628, L08.9    Osteomyelitis of great toe of left foot M86.9    DKA, type 1, not at goal ([facility]) E10.10    Diabetic acidosis without coma ([facility]) E11.10    New onset of congestive heart failure ([facility]) I50.9       RECOMMENDATIONS:  Continue present dose of neprilysin inhibitor as serum creatinine seems to be improving somewhat as well assess volume status

## 2024-10-12 NOTE — PROGRESS NOTES
[facility]  Nephrology Progress Note  Patient's Name: Santosh Thomas  8:29 PM  10/11/2024    Nephrologist: seen twice inpatient by [facility] in 2018 with no subsequent outpatient follow up    Reason for Consult:  GENIA  Requesting Physician:  Devaughn Reed DO    Chief Complaint:  weakness    History Obtained From:  patient, chart    History of Present Illness from the 10/6/24 note by Dr. Antoine:    Santosh Thomas is a 73 y.o. male with past medical history of longstanding type 2 diabetes now on insulin therapy, hypertension, diabetic complications including neuropathy and what sounds to be retinopathy as he follows Dr. Vazquez at [facility] and has required injections in the past, toe amputation of the left foot.  He also has history of hypertriglyce so with podiatry.  Ridemia good resolved on fenofibrate presently.  He also has some history of chronic kidney disease, likely stage IIIb based off recent labs that are available to review in the chart.  He has had multiple acute kidney injuries, our practice is seen him on 2 occurrences in 2018, once was felt to be related to rhabdomyolysis.  He did have recovery with IV fluids.  He had also had a history of NSAID use at that time.  He did not have any proteinuria at that time.  He was seen on a separate occasion in 2018 when serum creatinine peaked at 4.0 mg/dL on presentation though quickly improved to a baseline of 1.2 mg/dL.  We have not seen him since.  He has had a baseline serum creatinine of what appears to be 1.3 up to around 2.0 mg/dL over the past 1-1/2 years.  He has not seen nephrology.  Of concern, a urinalysis in January 2023 which showed a significant increase of proteinuria.    Patient presented to the hospital yesterday after being advised by his primary care physician to present.  The patient gives a history of having increased leg swelling, worsening of exertional dyspnea, weakness, fatigue that has worsened rather drastically over the  LABGLOM 37 09/16/2023 01:52 PM    GLUCOSE 135 10/11/2024 03:05 AM     Albumin:  No results found for: \"LABALBU\"  Ionized Calcium:  No components found for: \"IONCA\"  Magnesium:    Lab Results   Component Value Date/Time    MG 1.6 10/11/2024 03:05 AM     Phosphorus:    Lab Results   Component Value Date/Time    PHOS 3.5 10/11/2024 03:05 AM     LDH:  No results found for: \"LDH\"  Uric Acid:    Lab Results   Component Value Date/Time    URICACID 7.3 10/02/2024 06:20 AM     Troponin:    Lab Results   Component Value Date/Time    TROPONINI 0.07 01/10/2018 03:15 AM     U/A:    Lab Results   Component Value Date/Time    COLORU Yellow 10/01/2024 03:05 AM    PROTEINU >=300 10/01/2024 03:05 AM    PHUR 6.0 10/01/2024 03:05 AM    PHUR 6.0 01/26/2023 09:23 AM    WBCUA 0 TO 5 10/01/2024 03:05 AM    RBCUA 0 TO 2 10/01/2024 03:05 AM    BACTERIA 1+ 10/01/2024 03:05 AM    CLARITYU Clear 01/26/2023 09:23 AM    LEUKOCYTESUR NEGATIVE 10/01/2024 03:05 AM    UROBILINOGEN 0.2 10/01/2024 03:05 AM    BILIRUBINUR NEGATIVE 10/01/2024 03:05 AM    BLOODU LARGE 01/26/2023 09:23 AM    GLUCOSEU >=1000 10/01/2024 03:05 AM     HgBA1c:    Lab Results   Component Value Date/Time    LABA1C 6.6 10/01/2024 05:14 AM     Microalbumen/Creatinine ratio:  No components found for: \"RUCREAT\"  FLP:    Lab Results   Component Value Date/Time    TRIG 117 10/01/2024 05:14 AM    HDL 41 10/01/2024 05:14 AM     TSH:    Lab Results   Component Value Date/Time    TSH 1.00 10/01/2024 05:14 AM     VITAMIN B12: No components found for: \"B12\"  FOLATE:    Lab Results   Component Value Date/Time    FOLATE 13.3 10/04/2024 03:31 AM     IRON:    Lab Results   Component Value Date/Time    IRON 29 10/03/2024 03:47 AM     Iron Saturation:  No components found for: \"PERCENTFE\"  TIBC:    Lab Results   Component Value Date/Time    TIBC 227 10/03/2024 03:47 AM     FERRITIN:    Lab Results   Component Value Date/Time    FERRITIN 36 10/03/2024 03:47 AM            Assessment/Plan:    1-GENIA  Free kappa/Lambda not elevated  PLAN:  -continue IV iron  -Follow CBC  -Transfuse for HgB <7.0  -Continue JORGE    6-Nutrition  -Albumin low at 2.6 g/dL  may be reflective of underlying near nephrotic syndrome   PLAN:  -Monitor    7. HFrEF  PLAN  Follow uo  Continue  entresto  On bb and hydralazine as well, nitro  Continue po Bumetanide 2mg ,bid          Sally Strong MD  8:29 PM  10/11/2024

## 2024-10-12 NOTE — PROGRESS NOTES
PROGRESS NOTE       PATIENT PROBLEM LIST:  Patient Active Problem List   Diagnosis Code    Rhabdomyolysis M62.82    Delirium due to another medical condition F05    Diabetic foot infection ([facility]) E11.628, L08.9    Osteomyelitis of great toe of left foot M86.9    DKA, type 1, not at goal ([facility]) E10.10    Diabetic acidosis without coma ([facility]) E11.10    New onset of congestive heart failure ([facility]) I50.9       SUBJECTIVE:  Santosh Thomas is sitting up in bed eating lunch and states that he feels much improved and notes minimal lower extremity edema.  He is tolerating his dialysis sessions as well and is anxious to be discharged.  REVIEW OF SYSTEMS:  General ROS: positive for - fatigue.-Improving somewhat  Psychological ROS: positive for - anxiety.  Ophthalmic ROS: positive for - decreased vision and utilizes corrective lenses for visual acuity.    ENT ROS: negative  Allergy and Immunology ROS: negative  Hematological and Lymphatic ROS: negative  Endocrine: no heat or cold intolerance and no polyphagia, polydipsia, or polyuria  Respiratory ROS: positive for - cough and shortness of breath-improved  Cardiovascular ROS: positive for - edema and irregular heartbeat.  Gastrointestinal ROS: no abdominal pain, change in bowel habits, or black or bloody stools  Genito-Urinary ROS: no nocturia, dysuria, trouble voiding, frequency or hematuria  Musculoskeletal ROS: negative for- myalgias, arthralgias, or claudication  Neurological ROS: no TIA or stroke symptoms otherwise no significant change in symptoms or problems since yesterday as documented in previous progress notes.    SCHEDULED MEDICATIONS:   bumetanide  2 mg Oral BID AC    sacubitril-valsartan  1 tablet Oral BID    epoetin funmi-epbx  6,000 Units SubCUTAneous Once per day on Monday Wednesday Friday    calcitRIOL  0.25 mcg Oral Once per day on Monday Wednesday Friday    allopurinol  100 mg Oral Daily    nitroglycerin  1 inch Topical 3 times per day    [Held by provider]  Meds: Reviewed  Continuous Infusions:    dextrose         Intake/Output Summary (Last 24 hours) at 10/12/2024 1658  Last data filed at 10/12/2024 1035  Gross per 24 hour   Intake 300 ml   Output 2050 ml   Net -1750 ml     CBC:   Recent Labs     10/10/24  0622 10/11/24  0305 10/12/24  0333   WBC 6.9 7.7 6.1   HGB 9.9* 9.8* 9.1*   HCT 30.3* 31.1* 28.6*    230 198       BMP:  Recent Labs     10/10/24  0622 10/11/24  0305 10/12/24  0333    136 138   K 4.3 4.3 4.3    102 104   CO2 24 25 27   BUN 46* 30* 23   CREATININE 4.4* 3.4* 3.0*   LABGLOM 14* 19* 22*     ABGs: No results found for: \"PH\", \"PO2\", \"PCO2\"  INR:   No results for input(s): \"INR\" in the last 72 hours.    PRO-BNP:   Lab Results   Component Value Date    PROBNP 34,195 (H) 09/30/2024    PROBNP 302 (H) 09/07/2019      TSH:   Lab Results   Component Value Date    TSH 1.00 10/01/2024      Cardiac Injury Profile: No results for input(s): \"TROPHS\" in the last 72 hours.   Lipid Profile:   Lab Results   Component Value Date/Time    TRIG 117 10/01/2024 05:14 AM    HDL 41 10/01/2024 05:14 AM    CHOL 159 10/01/2024 05:14 AM      Hemoglobin A1C: No components found for: \"HGBA1C\"      RAD:   XR CHEST PORTABLE    Result Date: 10/3/2024  There is right middle lobe opacity concerning for pneumonia and or atelectasis.         EKG: See Report  Echo: See Report      IMPRESSIONS:  Patient Active Problem List   Diagnosis Code    Rhabdomyolysis M62.82    Delirium due to another medical condition F05    Diabetic foot infection ([facility]) E11.628, L08.9    Osteomyelitis of great toe of left foot M86.9    DKA, type 1, not at goal ([facility]) E10.10    Diabetic acidosis without coma ([facility]) E11.10    New onset of congestive heart failure ([facility]) I50.9       RECOMMENDATIONS:  Hopefully if stable tomorrow consider discharge as cardiac status remains stable on present medical regimen.  Continue to closely monitor blood pressure and heart rhythm.  I have spent more than 25 minutes

## 2024-10-13 LAB
ALBUMIN SERPL-MCNC: 2.6 G/DL (ref 3.5–5.2)
ALP SERPL-CCNC: 110 U/L (ref 40–129)
ALT SERPL-CCNC: 8 U/L (ref 0–40)
ANION GAP SERPL CALCULATED.3IONS-SCNC: 8 MMOL/L (ref 7–16)
AST SERPL-CCNC: 19 U/L (ref 0–39)
BILIRUB SERPL-MCNC: 0.2 MG/DL (ref 0–1.2)
BUN SERPL-MCNC: 16 MG/DL (ref 6–23)
CALCIUM SERPL-MCNC: 7.6 MG/DL (ref 8.6–10.2)
CHLORIDE SERPL-SCNC: 103 MMOL/L (ref 98–107)
CO2 SERPL-SCNC: 28 MMOL/L (ref 22–29)
CREAT SERPL-MCNC: 2.7 MG/DL (ref 0.7–1.2)
ERYTHROCYTE [DISTWIDTH] IN BLOOD BY AUTOMATED COUNT: 15 % (ref 11.5–15)
GFR, ESTIMATED: 24 ML/MIN/1.73M2
GLUCOSE BLD-MCNC: 186 MG/DL (ref 74–99)
GLUCOSE BLD-MCNC: 213 MG/DL (ref 74–99)
GLUCOSE BLD-MCNC: 320 MG/DL (ref 74–99)
GLUCOSE SERPL-MCNC: 210 MG/DL (ref 74–99)
HCT VFR BLD AUTO: 28.5 % (ref 37–54)
HGB BLD-MCNC: 9 G/DL (ref 12.5–16.5)
MAGNESIUM SERPL-MCNC: 1.9 MG/DL (ref 1.6–2.6)
MCH RBC QN AUTO: 29.4 PG (ref 26–35)
MCHC RBC AUTO-ENTMCNC: 31.6 G/DL (ref 32–34.5)
MCV RBC AUTO: 93.1 FL (ref 80–99.9)
PHOSPHATE SERPL-MCNC: 2.3 MG/DL (ref 2.5–4.5)
PLATELET # BLD AUTO: 202 K/UL (ref 130–450)
PMV BLD AUTO: 9.6 FL (ref 7–12)
POTASSIUM SERPL-SCNC: 4.2 MMOL/L (ref 3.5–5)
PROT SERPL-MCNC: 5 G/DL (ref 6.4–8.3)
RBC # BLD AUTO: 3.06 M/UL (ref 3.8–5.8)
SODIUM SERPL-SCNC: 139 MMOL/L (ref 132–146)
WBC OTHER # BLD: 6.5 K/UL (ref 4.5–11.5)

## 2024-10-13 PROCEDURE — 6370000000 HC RX 637 (ALT 250 FOR IP): Performed by: INTERNAL MEDICINE

## 2024-10-13 PROCEDURE — 80053 COMPREHEN METABOLIC PANEL: CPT

## 2024-10-13 PROCEDURE — 2060000000 HC ICU INTERMEDIATE R&B

## 2024-10-13 PROCEDURE — 85027 COMPLETE CBC AUTOMATED: CPT

## 2024-10-13 PROCEDURE — 6370000000 HC RX 637 (ALT 250 FOR IP): Performed by: GENERAL PRACTICE

## 2024-10-13 PROCEDURE — 6370000000 HC RX 637 (ALT 250 FOR IP): Performed by: STUDENT IN AN ORGANIZED HEALTH CARE EDUCATION/TRAINING PROGRAM

## 2024-10-13 PROCEDURE — 82962 GLUCOSE BLOOD TEST: CPT

## 2024-10-13 PROCEDURE — 84100 ASSAY OF PHOSPHORUS: CPT

## 2024-10-13 PROCEDURE — 83735 ASSAY OF MAGNESIUM: CPT

## 2024-10-13 RX ORDER — BUMETANIDE 2 MG/1
2 TABLET ORAL
Qty: 30 TABLET | Refills: 3 | Status: SHIPPED | OUTPATIENT
Start: 2024-10-14

## 2024-10-13 RX ADMIN — PETROLATUM: 420 OINTMENT TOPICAL at 09:57

## 2024-10-13 RX ADMIN — INSULIN LISPRO 3 UNITS: 100 INJECTION, SOLUTION INTRAVENOUS; SUBCUTANEOUS at 11:56

## 2024-10-13 RX ADMIN — METOPROLOL SUCCINATE 25 MG: 25 TABLET, EXTENDED RELEASE ORAL at 09:58

## 2024-10-13 RX ADMIN — NITROGLYCERIN 1 INCH: 20 OINTMENT TOPICAL at 16:12

## 2024-10-13 RX ADMIN — HYDRALAZINE HYDROCHLORIDE 50 MG: 50 TABLET ORAL at 16:06

## 2024-10-13 RX ADMIN — METOPROLOL SUCCINATE 25 MG: 25 TABLET, EXTENDED RELEASE ORAL at 19:40

## 2024-10-13 RX ADMIN — SACUBITRIL AND VALSARTAN 1 TABLET: 24; 26 TABLET, FILM COATED ORAL at 09:58

## 2024-10-13 RX ADMIN — NITROGLYCERIN 1 INCH: 20 OINTMENT TOPICAL at 21:48

## 2024-10-13 RX ADMIN — DIAZEPAM 5 MG: 5 TABLET ORAL at 19:40

## 2024-10-13 RX ADMIN — SACUBITRIL AND VALSARTAN 1 TABLET: 24; 26 TABLET, FILM COATED ORAL at 19:40

## 2024-10-13 RX ADMIN — PANTOPRAZOLE SODIUM 40 MG: 40 TABLET, DELAYED RELEASE ORAL at 06:36

## 2024-10-13 RX ADMIN — HYDRALAZINE HYDROCHLORIDE 50 MG: 50 TABLET ORAL at 21:47

## 2024-10-13 RX ADMIN — MAGNESIUM OXIDE 400 MG (241.3 MG MAGNESIUM) TABLET 400 MG: TABLET at 19:40

## 2024-10-13 RX ADMIN — POTASSIUM & SODIUM PHOSPHATES POWDER PACK 280-160-250 MG 250 MG: 280-160-250 PACK at 09:58

## 2024-10-13 RX ADMIN — SERTRALINE HYDROCHLORIDE 50 MG: 50 TABLET ORAL at 09:58

## 2024-10-13 RX ADMIN — ALLOPURINOL 100 MG: 100 TABLET ORAL at 10:00

## 2024-10-13 RX ADMIN — Medication 2000 UNITS: at 09:59

## 2024-10-13 RX ADMIN — NITROGLYCERIN 1 INCH: 20 OINTMENT TOPICAL at 06:36

## 2024-10-13 RX ADMIN — BUMETANIDE 2 MG: 1 TABLET ORAL at 16:07

## 2024-10-13 RX ADMIN — DIAZEPAM 5 MG: 5 TABLET ORAL at 09:58

## 2024-10-13 RX ADMIN — BUMETANIDE 2 MG: 1 TABLET ORAL at 06:35

## 2024-10-13 RX ADMIN — HYDROXYZINE PAMOATE 25 MG: 25 CAPSULE ORAL at 19:40

## 2024-10-13 RX ADMIN — ROSUVASTATIN CALCIUM 40 MG: 20 TABLET, FILM COATED ORAL at 19:40

## 2024-10-13 RX ADMIN — MAGNESIUM OXIDE 400 MG (241.3 MG MAGNESIUM) TABLET 400 MG: TABLET at 09:58

## 2024-10-13 RX ADMIN — PANTOPRAZOLE SODIUM 40 MG: 40 TABLET, DELAYED RELEASE ORAL at 16:07

## 2024-10-13 RX ADMIN — HYDRALAZINE HYDROCHLORIDE 50 MG: 50 TABLET ORAL at 06:36

## 2024-10-13 ASSESSMENT — PAIN DESCRIPTION - LOCATION: LOCATION: CHEST

## 2024-10-13 ASSESSMENT — PAIN SCALES - GENERAL
PAINLEVEL_OUTOF10: 0
PAINLEVEL_OUTOF10: 1
PAINLEVEL_OUTOF10: 0

## 2024-10-13 ASSESSMENT — PAIN SCALES - WONG BAKER
WONGBAKER_NUMERICALRESPONSE: NO HURT
WONGBAKER_NUMERICALRESPONSE: NO HURT

## 2024-10-13 NOTE — PROGRESS NOTES
[facility]  Nephrology Progress Note  Patient's Name: Santosh Thomas  3:33 PM  10/13/2024    Nephrologist: seen twice inpatient by [facility] in 2018 with no subsequent outpatient follow up    Reason for Consult:  GENIA  Requesting Physician:  Devaughn Reed DO    Chief Complaint:  weakness    History Obtained From:  patient, chart    History of Present Illness from the 10/6/24 note by Dr. Antoine:    Santosh Thomas is a 74 y.o. male with past medical history of longstanding type 2 diabetes now on insulin therapy, hypertension, diabetic complications including neuropathy and what sounds to be retinopathy as he follows Dr. Vazquez at [facility] and has required injections in the past, toe amputation of the left foot.  He also has history of hypertriglyce so with podiatry.  Ridemia good resolved on fenofibrate presently.  He also has some history of chronic kidney disease, likely stage IIIb based off recent labs that are available to review in the chart.  He has had multiple acute kidney injuries, our practice is seen him on 2 occurrences in 2018, once was felt to be related to rhabdomyolysis.  He did have recovery with IV fluids.  He had also had a history of NSAID use at that time.  He did not have any proteinuria at that time.  He was seen on a separate occasion in 2018 when serum creatinine peaked at 4.0 mg/dL on presentation though quickly improved to a baseline of 1.2 mg/dL.  We have not seen him since.  He has had a baseline serum creatinine of what appears to be 1.3 up to around 2.0 mg/dL over the past 1-1/2 years.  He has not seen nephrology.  Of concern, a urinalysis in January 2023 which showed a significant increase of proteinuria.    Patient presented to the hospital yesterday after being advised by his primary care physician to present.  The patient gives a history of having increased leg swelling, worsening of exertional dyspnea, weakness, fatigue that has worsened rather drastically over the  IHD  -Follow CBC  -Transfuse for HgB <7.0  -Continue JORGE will be dosed at IHD    6-Nutrition  -Albumin low at 2.6 g/dL  may be reflective of underlying near nephrotic syndrome   PLAN:  -Monitor    7. HFrEF  PLAN  Follow uo  Continue  entresto  On bb and hydralazine as well, nitro  Continue po Bumetanide 2mg ,bid    Pt ok for discharge from a renal perspective      Sally Strong MD  3:33 PM  10/13/2024

## 2024-10-14 VITALS
DIASTOLIC BLOOD PRESSURE: 80 MMHG | WEIGHT: 187.83 LBS | OXYGEN SATURATION: 95 % | HEART RATE: 74 BPM | BODY MASS INDEX: 30.19 KG/M2 | HEIGHT: 66 IN | TEMPERATURE: 98 F | RESPIRATION RATE: 18 BRPM | SYSTOLIC BLOOD PRESSURE: 169 MMHG

## 2024-10-14 LAB
ALBUMIN SERPL-MCNC: 3 G/DL (ref 3.5–5.2)
ALP SERPL-CCNC: 104 U/L (ref 40–129)
ALT SERPL-CCNC: 8 U/L (ref 0–40)
ANION GAP SERPL CALCULATED.3IONS-SCNC: 9 MMOL/L (ref 7–16)
AST SERPL-CCNC: 22 U/L (ref 0–39)
BILIRUB SERPL-MCNC: 0.3 MG/DL (ref 0–1.2)
BUN SERPL-MCNC: 21 MG/DL (ref 6–23)
CALCIUM SERPL-MCNC: 8 MG/DL (ref 8.6–10.2)
CHLORIDE SERPL-SCNC: 100 MMOL/L (ref 98–107)
CO2 SERPL-SCNC: 28 MMOL/L (ref 22–29)
CREAT SERPL-MCNC: 3.4 MG/DL (ref 0.7–1.2)
ERYTHROCYTE [DISTWIDTH] IN BLOOD BY AUTOMATED COUNT: 15.2 % (ref 11.5–15)
GFR, ESTIMATED: 19 ML/MIN/1.73M2
GLUCOSE BLD-MCNC: 147 MG/DL (ref 74–99)
GLUCOSE SERPL-MCNC: 234 MG/DL (ref 74–99)
HCT VFR BLD AUTO: 31.3 % (ref 37–54)
HGB BLD-MCNC: 9.9 G/DL (ref 12.5–16.5)
MAGNESIUM SERPL-MCNC: 1.6 MG/DL (ref 1.6–2.6)
MCH RBC QN AUTO: 29.5 PG (ref 26–35)
MCHC RBC AUTO-ENTMCNC: 31.6 G/DL (ref 32–34.5)
MCV RBC AUTO: 93.2 FL (ref 80–99.9)
PHOSPHATE SERPL-MCNC: 2.4 MG/DL (ref 2.5–4.5)
PLATELET # BLD AUTO: 206 K/UL (ref 130–450)
PMV BLD AUTO: 10.7 FL (ref 7–12)
POTASSIUM SERPL-SCNC: 4.1 MMOL/L (ref 3.5–5)
PROT SERPL-MCNC: 5.6 G/DL (ref 6.4–8.3)
RBC # BLD AUTO: 3.36 M/UL (ref 3.8–5.8)
SODIUM SERPL-SCNC: 137 MMOL/L (ref 132–146)
WBC OTHER # BLD: 7.4 K/UL (ref 4.5–11.5)

## 2024-10-14 PROCEDURE — 6370000000 HC RX 637 (ALT 250 FOR IP): Performed by: GENERAL PRACTICE

## 2024-10-14 PROCEDURE — 85027 COMPLETE CBC AUTOMATED: CPT

## 2024-10-14 PROCEDURE — 6370000000 HC RX 637 (ALT 250 FOR IP): Performed by: INTERNAL MEDICINE

## 2024-10-14 PROCEDURE — 83735 ASSAY OF MAGNESIUM: CPT

## 2024-10-14 PROCEDURE — 84100 ASSAY OF PHOSPHORUS: CPT

## 2024-10-14 PROCEDURE — 6370000000 HC RX 637 (ALT 250 FOR IP): Performed by: STUDENT IN AN ORGANIZED HEALTH CARE EDUCATION/TRAINING PROGRAM

## 2024-10-14 PROCEDURE — 80053 COMPREHEN METABOLIC PANEL: CPT

## 2024-10-14 PROCEDURE — 97530 THERAPEUTIC ACTIVITIES: CPT

## 2024-10-14 PROCEDURE — 36415 COLL VENOUS BLD VENIPUNCTURE: CPT

## 2024-10-14 PROCEDURE — 82962 GLUCOSE BLOOD TEST: CPT

## 2024-10-14 RX ORDER — ALLOPURINOL 100 MG/1
100 TABLET ORAL DAILY
Qty: 30 TABLET | Refills: 3 | Status: SHIPPED | OUTPATIENT
Start: 2024-10-14

## 2024-10-14 RX ORDER — CALCITRIOL 0.25 UG/1
0.25 CAPSULE, LIQUID FILLED ORAL
Qty: 30 CAPSULE | Refills: 3 | Status: SHIPPED | OUTPATIENT
Start: 2024-10-14

## 2024-10-14 RX ADMIN — SERTRALINE HYDROCHLORIDE 50 MG: 50 TABLET ORAL at 10:31

## 2024-10-14 RX ADMIN — MAGNESIUM OXIDE 400 MG (241.3 MG MAGNESIUM) TABLET 400 MG: TABLET at 10:31

## 2024-10-14 RX ADMIN — HYDRALAZINE HYDROCHLORIDE 50 MG: 50 TABLET ORAL at 05:59

## 2024-10-14 RX ADMIN — BUMETANIDE 2 MG: 1 TABLET ORAL at 05:59

## 2024-10-14 RX ADMIN — ALLOPURINOL 100 MG: 100 TABLET ORAL at 10:31

## 2024-10-14 RX ADMIN — SACUBITRIL AND VALSARTAN 1 TABLET: 24; 26 TABLET, FILM COATED ORAL at 10:31

## 2024-10-14 RX ADMIN — DIAZEPAM 5 MG: 5 TABLET ORAL at 10:31

## 2024-10-14 RX ADMIN — NITROGLYCERIN 1 INCH: 20 OINTMENT TOPICAL at 06:00

## 2024-10-14 RX ADMIN — Medication 2000 UNITS: at 10:31

## 2024-10-14 RX ADMIN — METOPROLOL SUCCINATE 25 MG: 25 TABLET, EXTENDED RELEASE ORAL at 10:31

## 2024-10-14 RX ADMIN — PANTOPRAZOLE SODIUM 40 MG: 40 TABLET, DELAYED RELEASE ORAL at 06:00

## 2024-10-14 ASSESSMENT — PAIN SCALES - GENERAL: PAINLEVEL_OUTOF10: 0

## 2024-10-14 NOTE — PROGRESS NOTES
PROGRESS NOTE       PATIENT PROBLEM LIST:  Patient Active Problem List   Diagnosis Code    Rhabdomyolysis M62.82    Delirium due to another medical condition F05    Diabetic foot infection ([facility]) E11.628, L08.9    Osteomyelitis of great toe of left foot M86.9    DKA, type 1, not at goal ([facility]) E10.10    Diabetic acidosis without coma ([facility]) E11.10    New onset of congestive heart failure ([facility]) I50.9       SUBJECTIVE:  Santosh Thomas is sitting up in bed eating lunch and states that he feels much improved and notes minimal lower extremity edema.  He is tolerating his dialysis sessions as well and is anxious to be discharged.  REVIEW OF SYSTEMS:  General ROS: positive for - fatigue.-Improving somewhat  Psychological ROS: positive for - anxiety.  Ophthalmic ROS: positive for - decreased vision and utilizes corrective lenses for visual acuity.    ENT ROS: negative  Allergy and Immunology ROS: negative  Hematological and Lymphatic ROS: negative  Endocrine: no heat or cold intolerance and no polyphagia, polydipsia, or polyuria  Respiratory ROS: positive for - cough and shortness of breath-improved  Cardiovascular ROS: positive for - edema and irregular heartbeat.  Gastrointestinal ROS: no abdominal pain, change in bowel habits, or black or bloody stools  Genito-Urinary ROS: no nocturia, dysuria, trouble voiding, frequency or hematuria  Musculoskeletal ROS: negative for- myalgias, arthralgias, or claudication  Neurological ROS: no TIA or stroke symptoms otherwise no significant change in symptoms or problems since yesterday as documented in previous progress notes.    SCHEDULED MEDICATIONS:   bumetanide  2 mg Oral BID AC    sacubitril-valsartan  1 tablet Oral BID    epoetin funmi-epbx  6,000 Units SubCUTAneous Once per day on Monday Wednesday Friday    calcitRIOL  0.25 mcg Oral Once per day on Monday Wednesday Friday    allopurinol  100 mg Oral Daily    nitroglycerin  1 inch Topical 3 times per day    [Held by provider]  minutes face to face with Santosh Thomas and reviewing notes and laboratory data, with greater than 50% of this time instructing and counseling the patient  face to face regarding my findings and recommendations and I have answered all questions as posed to me by Mr. Thomas and his brother who is at his bedside presently..    Rancho Boone, DO FACP,FACC,[facility]      NOTE:  This report was transcribed using voice recognition software.  Every effort was made to ensure accuracy; however, inadvertent computerized transcription errors may be present

## 2024-10-14 NOTE — PROGRESS NOTES
CLINICAL PHARMACY NOTE: MEDS TO BEDS    Total # of Prescriptions Filled: 2     The following medications were delivered to the patient:    ALLOPURINOL 100  CALCITROL 0.25    Additional Documentation:

## 2024-10-14 NOTE — PROGRESS NOTES
PROGRESS NOTE       PATIENT PROBLEM LIST:  Patient Active Problem List   Diagnosis Code    Rhabdomyolysis M62.82    Delirium due to another medical condition F05    Diabetic foot infection ([facility]) E11.628, L08.9    Osteomyelitis of great toe of left foot M86.9    DKA, type 1, not at goal ([facility]) E10.10    Diabetic acidosis without coma ([facility]) E11.10    New onset of congestive heart failure ([facility]) I50.9       SUBJECTIVE:  Santosh Thomas is sitting up in bed eating lunch and states that he feels much improved and notes minimal lower extremity edema.  He is tolerating his dialysis sessions as well and is anxious to be discharged.  REVIEW OF SYSTEMS:  General ROS: positive for - fatigue.-Improving somewhat  Psychological ROS: positive for - anxiety.  Ophthalmic ROS: positive for - decreased vision and utilizes corrective lenses for visual acuity.    ENT ROS: negative  Allergy and Immunology ROS: negative  Hematological and Lymphatic ROS: negative  Endocrine: no heat or cold intolerance and no polyphagia, polydipsia, or polyuria  Respiratory ROS: positive for - cough and shortness of breath-improved  Cardiovascular ROS: positive for - edema and irregular heartbeat.  Gastrointestinal ROS: no abdominal pain, change in bowel habits, or black or bloody stools  Genito-Urinary ROS: no nocturia, dysuria, trouble voiding, frequency or hematuria  Musculoskeletal ROS: negative for- myalgias, arthralgias, or claudication  Neurological ROS: no TIA or stroke symptoms otherwise no significant change in symptoms or problems since yesterday as documented in previous progress notes.    SCHEDULED MEDICATIONS:   bumetanide  2 mg Oral BID AC    sacubitril-valsartan  1 tablet Oral BID    epoetin funmi-epbx  6,000 Units SubCUTAneous Once per day on Monday Wednesday Friday    calcitRIOL  0.25 mcg Oral Once per day on Monday Wednesday Friday    allopurinol  100 mg Oral Daily    nitroglycerin  1 inch Topical 3 times per day    [Held by provider]  minutes face to face with Santosh Thomas and reviewing notes and laboratory data, with greater than 50% of this time instructing and counseling the patient  face to face regarding my findings and recommendations and I have answered all questions as posed to me by Mr. Thomas and his brother who is at his bedside presently..    Rancho Boone, DO FACP,FACC,[facility] [place]      NOTE:  This report was transcribed using voice recognition software.  Every effort was made to ensure accuracy; however, inadvertent computerized transcription errors may be present

## 2024-10-14 NOTE — CARE COORDINATION
Met with patient and ex wife Latonia at bedside. Per family's request PT evaluated patient ambulating steps: 6 steps with B hands on 1 rail SBA, step to pattern used.  Pt states feels safe to do steps at home. PT/18. Discharge order noted, Discharge plan is home with [facility], [facility] was notified of discharge. [facility] notified of discharge patient scheduled for HD tomorrow at 10:35 am. Patient will arrange ride to HD thru insurance, Latonia stated she can drive him to [address] [place] [address]. Latonia will also transport home today.  Brooke KUMARIN, RN  Case Management

## 2024-10-14 NOTE — PLAN OF CARE
Problem: ABCDS Injury Assessment  Goal: Absence of physical injury  10/14/2024 1054 by Lianna Lim RN  Outcome: Completed  10/13/2024 2204 by Capri Barroso RN  Outcome: Progressing     Problem: Discharge Planning  Goal: Discharge to home or other facility with appropriate resources  10/14/2024 1054 by Lianna Lim RN  Outcome: Completed  10/13/2024 2204 by Capri Barroso RN  Outcome: Progressing     Problem: Chronic Conditions and Co-morbidities  Goal: Patient's chronic conditions and co-morbidity symptoms are monitored and maintained or improved  10/14/2024 1054 by Lianna Lim RN  Outcome: Completed  10/13/2024 2204 by Capri Barroso RN  Outcome: Progressing     Problem: Safety - Adult  Goal: Free from fall injury  10/14/2024 1054 by Lianna Lim RN  Outcome: Completed  10/13/2024 2204 by Capri Barroso RN  Outcome: Progressing     Problem: Pain  Goal: Verbalizes/displays adequate comfort level or baseline comfort level  Outcome: Completed     Problem: Skin/Tissue Integrity  Goal: Absence of new skin breakdown  Description: 1.  Monitor for areas of redness and/or skin breakdown  2.  Assess vascular access sites hourly  3.  Every 4-6 hours minimum:  Change oxygen saturation probe site  4.  Every 4-6 hours:  If on nasal continuous positive airway pressure, respiratory therapy assess nares and determine need for appliance change or resting period.  Outcome: Completed     Problem: Nutrition Deficit:  Goal: Optimize nutritional status  Outcome: Completed

## 2024-10-14 NOTE — PROGRESS NOTES
Physical Therapy  Facility/Department: [address] MED SURG  Physical Therapy Treatment Note    Name: Santosh Thomas  : 1950  MRN: [id number]  Date of Service: 10/14/2024                Patient Diagnosis(es): The primary encounter diagnosis was New onset of congestive heart failure (HCC). Diagnoses of Acute renal failure superimposed on chronic kidney disease, unspecified acute renal failure type, unspecified CKD stage (HCC), Hypomagnesemia, Acute otitis externa of right ear, unspecified type, Hypertension, unspecified type, and Pain in left lower leg were also pertinent to this visit.  Past Medical History:  has a past medical history of Dementia (HCC), Depression, Diabetes mellitus (HCC), Hypertension, and Neuropathy.  Past Surgical History:  has a past surgical history that includes back surgery; Foot Debridement (Left, 3/25/2021); Toe amputation (Left, 3/28/2021); and IR TUNNELED CVC PLACE WO SQ PORT/PUMP > 5 YEARS (10/10/2024).              Evaluating Therapist: Emily Currie, PT     Rec ww     Referring Provider:  Devaughn Reed DO     PT order : PT eval and treat     Room #: 610  DIAGNOSIS: The primary encounter diagnosis was New onset of congestive heart failure (HCC). Diagnoses of Acute renal failure superimposed on chronic kidney disease, unspecified acute renal failure type, unspecified CKD stage (HCC), Hypomagnesemia, Acute otitis externa of right ear, unspecified type, and Hypertension, unspecified type were also pertinent to this visit.  PRECAUTIONS: falls     Social:  Pt lives alone  in a  1  floor plan  apartment  steps and  1  rails to enter.  Prior to admission pt walked with  cane      Initial Evaluation  Date:  10/1/2024  Treatment  10/14/2024    Short Term/ Long Term   Goals   Was pt agreeable to Eval/treatment?  Yes  yes    Does pt have pain?  Discomfort from LE edema  No complains    Bed Mobility  Rolling:  NT   Supine to sit: min assist   Sit to supine:  min assist   Scooting:  SBA in  sit  Supine to sit: SBA  Scooting: SBA seated to EOB Independent    Transfers Sit to stand:  SBA   Stand to sit:  SBA   Stand pivot:  NT  Sit to stand; SBA  Stand to sit; SBA  Independent    Ambulation     200  feet with  ww  with  SBA  110 feet x 2 with WW SBA  200  feet with  ww  with  independent        Stair negotiation: ascended and descended NT  6 steps with B hands on 1 rail SBA, step to pattern used  4  steps with  1  rail with  S/SBA    LE ROM  AAROM WFL      LE strength  3-/ 5 hips, 4-/ 5 distally      AM- PAC RAW score  17/ 24 18/24      Pt is alert, following instruction, states wants to go home. Dr Reed present initially, told Pt he is going home later today.  Balance: fair dynamic with WW    Pt performed therapeutic exercise of the following: NT    Patient education/treatment  Pt was educated on UE usage transfer safety, gait mechanics for upright posture/staying within WW base of support, step negotiation for B hands on 1 rail and step to pattern    Patient response to education:   Pt verbalized understanding Pt demonstrated skill Pt requires further education in this area   yes With instruction yes     ASSESSMENT:   Comments: Nurse ok with Rx. Pt found in bed, sat EOB SBA. Gait slow and consistent, no loss of balance/shortness of breath/dizziness noted. Steps with minimal difficulty, Pt states feels safe to do step at home.   Pt was left in a bedside chair with call light in reach, waffle cushion in place  Chair alarm active    Time in 0910  Time out 0927   Total Treatment Time 17 minutes   CPT codes:     Therapeutic activities 62440 17 minutes   Therapeutic exercises 08713 0 minutes       Pt is making good progress toward established Physical Therapy goals.  Continue with physical therapy current plan of care.    Korey Orozco PTA   License Number: [id number]

## 2024-11-12 ENCOUNTER — TELEPHONE (OUTPATIENT)
Dept: ENT CLINIC | Age: 74
End: 2024-11-12

## 2024-11-12 NOTE — TELEPHONE ENCOUNTER
Pt called office to schedule. He missed his Oct appt due to being in the hospital. Pt now has new hearing aids but his ears are so clogged he can't hear properly.  Pt is currently scheduled for January and is wondering if there is any way he can be seen sooner with any provider. Pt can't come on tues and thurs due to dialysis on those days.    Please advise.    Electronically signed by Camila Brady on 11/12/2024 at 9:20 AM

## 2024-11-12 NOTE — TELEPHONE ENCOUNTER
I spoke with patient, scheduled 11/18 with ZEKE Lopez. Pt will try to have last hearing test faxed to our office prior to appt.     Electronically signed by Carina Jarrett MA on 11/12/24 at 10:55 AM EST

## 2024-11-18 ENCOUNTER — OFFICE VISIT (OUTPATIENT)
Dept: ENT CLINIC | Age: 74
End: 2024-11-18
Payer: MEDICARE

## 2024-11-18 VITALS
HEIGHT: 66 IN | HEART RATE: 86 BPM | SYSTOLIC BLOOD PRESSURE: 136 MMHG | WEIGHT: 166.2 LBS | BODY MASS INDEX: 26.71 KG/M2 | RESPIRATION RATE: 18 BRPM | OXYGEN SATURATION: 99 % | TEMPERATURE: 98.3 F | DIASTOLIC BLOOD PRESSURE: 88 MMHG

## 2024-11-18 DIAGNOSIS — H72.91 PERFORATION OF RIGHT TYMPANIC MEMBRANE: Primary | ICD-10-CM

## 2024-11-18 PROCEDURE — G8484 FLU IMMUNIZE NO ADMIN: HCPCS

## 2024-11-18 PROCEDURE — 1123F ACP DISCUSS/DSCN MKR DOCD: CPT

## 2024-11-18 PROCEDURE — 99203 OFFICE O/P NEW LOW 30 MIN: CPT

## 2024-11-18 PROCEDURE — G8427 DOCREV CUR MEDS BY ELIG CLIN: HCPCS

## 2024-11-18 PROCEDURE — 1159F MED LIST DOCD IN RCRD: CPT

## 2024-11-18 PROCEDURE — G8419 CALC BMI OUT NRM PARAM NOF/U: HCPCS

## 2024-11-18 PROCEDURE — 1036F TOBACCO NON-USER: CPT

## 2024-11-18 PROCEDURE — 1160F RVW MEDS BY RX/DR IN RCRD: CPT

## 2024-11-18 PROCEDURE — 3017F COLORECTAL CA SCREEN DOC REV: CPT

## 2024-11-18 RX ORDER — CIPROFLOXACIN AND DEXAMETHASONE 3; 1 MG/ML; MG/ML
4 SUSPENSION/ DROPS AURICULAR (OTIC) 2 TIMES DAILY
Qty: 7.5 ML | Refills: 0 | Status: SHIPPED | OUTPATIENT
Start: 2024-11-18 | End: 2024-11-25

## 2024-11-18 ASSESSMENT — ENCOUNTER SYMPTOMS
SINUS PRESSURE: 0
ALLERGIC/IMMUNOLOGIC NEGATIVE: 1
RHINORRHEA: 0
EYE PAIN: 0
VOMITING: 0
DIARRHEA: 0
SORE THROAT: 0
SHORTNESS OF BREATH: 0
EYE DISCHARGE: 0
COUGH: 0
BACK PAIN: 0

## 2024-11-18 NOTE — PROGRESS NOTES
patient to initiate use of Ciprodex eardrops 4 drops to the right ear twice daily x 7 days to return to the office if symptoms persist.  Repeat audio would be recommended 1 year from previous audiogram.  If asymmetry persists will consider MRI to rule out acoustic neuroma.   He he will also discuss possibility of MRI with contrast with his nephrologist as he is currently a dialysis patient.  He did verbalize understanding was in agreement to this plan.  He was instructed to call the office for any new or worsening symptoms prior to his next appointment.    Follow up in 1 month(s)      RX given today:  ade Lopez MSN, FNP-BC  [facility]    The information contained in this note has been dictated using drug and medical speech recognition software and may contain errors

## 2024-12-09 ENCOUNTER — TELEPHONE (OUTPATIENT)
Dept: ENT CLINIC | Age: 74
End: 2024-12-09

## 2024-12-09 NOTE — TELEPHONE ENCOUNTER
Pt called office regarding his 12/23 appt. His dialysis was switched to that day because of lamont. Pt wants to know if he can be fit in the week before on a m-w-f.    Please advise.    Electronically signed by Camila Brady on 12/9/2024 at 8:19 AM

## 2024-12-09 NOTE — TELEPHONE ENCOUNTER
Patient rescheduled 12/20/24    Electronically signed by Ashleigh Kilgore MA on 12/9/24 at 8:49 AM EST

## 2024-12-20 ENCOUNTER — OFFICE VISIT (OUTPATIENT)
Dept: ENT CLINIC | Age: 74
End: 2024-12-20
Payer: MEDICARE

## 2024-12-20 VITALS
OXYGEN SATURATION: 96 % | WEIGHT: 169.9 LBS | SYSTOLIC BLOOD PRESSURE: 144 MMHG | BODY MASS INDEX: 27.31 KG/M2 | TEMPERATURE: 97 F | HEIGHT: 66 IN | DIASTOLIC BLOOD PRESSURE: 84 MMHG | HEART RATE: 84 BPM

## 2024-12-20 DIAGNOSIS — H91.8X3 ASYMMETRICAL HEARING LOSS: ICD-10-CM

## 2024-12-20 DIAGNOSIS — H72.91 PERFORATION OF RIGHT TYMPANIC MEMBRANE: Primary | ICD-10-CM

## 2024-12-20 DIAGNOSIS — H90.A22 SENSORINEURAL HEARING LOSS (SNHL) OF LEFT EAR WITH RESTRICTED HEARING OF RIGHT EAR: ICD-10-CM

## 2024-12-20 DIAGNOSIS — H90.A31 MIXED CONDUCTIVE AND SENSORINEURAL HEARING LOSS OF RIGHT EAR WITH RESTRICTED HEARING OF LEFT EAR: ICD-10-CM

## 2024-12-20 PROCEDURE — G8419 CALC BMI OUT NRM PARAM NOF/U: HCPCS

## 2024-12-20 PROCEDURE — G8484 FLU IMMUNIZE NO ADMIN: HCPCS

## 2024-12-20 PROCEDURE — 1036F TOBACCO NON-USER: CPT

## 2024-12-20 PROCEDURE — 99212 OFFICE O/P EST SF 10 MIN: CPT

## 2024-12-20 PROCEDURE — 3017F COLORECTAL CA SCREEN DOC REV: CPT

## 2024-12-20 PROCEDURE — G8427 DOCREV CUR MEDS BY ELIG CLIN: HCPCS

## 2024-12-20 PROCEDURE — 1159F MED LIST DOCD IN RCRD: CPT

## 2024-12-20 PROCEDURE — 1123F ACP DISCUSS/DSCN MKR DOCD: CPT

## 2024-12-20 PROCEDURE — 1160F RVW MEDS BY RX/DR IN RCRD: CPT

## 2024-12-20 ASSESSMENT — ENCOUNTER SYMPTOMS
DIARRHEA: 0
EYE PAIN: 0
EYE DISCHARGE: 0
RHINORRHEA: 0
SORE THROAT: 0
SINUS PRESSURE: 0
ALLERGIC/IMMUNOLOGIC NEGATIVE: 1
SHORTNESS OF BREATH: 0
COUGH: 0
BACK PAIN: 0
VOMITING: 0

## 2024-12-20 NOTE — PROGRESS NOTES
Subjective:      Patient ID:  Santosh Thomas is a 74 y.o. male.    HPI:    Patient presents today for recheck of the right ear.  Patient is not doing better.  Drops:yes   Type:  ciprodes  Pain: no  Drainage: no   Wick: no    Cerumen impaction: yes      Past Medical History:   Diagnosis Date    Dementia (HCC)     Depression     Diabetes mellitus (HCC)     Hypertension     Neuropathy      Past Surgical History:   Procedure Laterality Date    BACK SURGERY      FOOT DEBRIDEMENT Left 3/25/2021    BONE BIOPSY AND CULTURE, GREAT TOE LEFT FOOT performed by Velasquez Wilcox DPM at [facility] OR    IR TUNNELED CVC PLACE WO SQ PORT/PUMP > 5 YEARS  10/10/2024    FL TUNNELED CVC PLACE WO SQ PORT/PUMP > 5 YEARS 10/10/2024 [facility] RADIOLOGY    TOE AMPUTATION Left 3/28/2021    LEFT FOOT TOE AMPUTATION,  LEFT FOOT PARTIAL HALLUX AMPUTATION WITH DEBRIDEMENT OF NON VIABLE TISSUE performed by Velasquez Wilcox DPM at [facility] OR     History reviewed. No pertinent family history.  Social History     Socioeconomic History    Marital status: Legally      Spouse name: None    Number of children: None    Years of education: None    Highest education level: None   Tobacco Use    Smoking status: Former     Current packs/day: 0.25     Types: Cigarettes     Passive exposure: Current    Smokeless tobacco: Never    Tobacco comments:     Quit smoking 4 months ago    Vaping Use    Vaping status: Never Used   Substance and Sexual Activity    Alcohol use: No    Drug use: Yes     Comment: gummies     Social Determinants of Health     Food Insecurity: No Food Insecurity (9/30/2024)    Hunger Vital Sign     Worried About Running Out of Food in the Last Year: Never true     Ran Out of Food in the Last Year: Never true   Transportation Needs: No Transportation Needs (9/30/2024)    PRAPARE - Transportation     Lack of Transportation (Medical): No     Lack of Transportation (Non-Medical): No   Housing Stability: Low Risk  (9/30/2024)    Housing Stability

## 2025-01-07 ENCOUNTER — TELEPHONE (OUTPATIENT)
Dept: ENT CLINIC | Age: 75
End: 2025-01-07

## 2025-01-07 NOTE — TELEPHONE ENCOUNTER
Pt called office. He said he was to find out if he can get an MRI while on dialysis. Pt was told he can but he needs to find out if any dye will be used.   Please advise.    Electronically signed by Camila Brady on 1/7/2025 at 9:40 AM

## 2025-01-07 NOTE — TELEPHONE ENCOUNTER
Called patient to advise per provider since patient is being referred to Dr. Domínguez can order scan once seen by otologist left a detailed voicemail.

## 2025-01-07 NOTE — TELEPHONE ENCOUNTER
Will correspond with Jessica.    Electronically signed by Ashleigh Kilgore MA on 1/7/25 at 9:51 AM EST

## 2025-02-10 ENCOUNTER — OFFICE VISIT (OUTPATIENT)
Dept: VASCULAR SURGERY | Age: 75
End: 2025-02-10

## 2025-02-10 ENCOUNTER — PREP FOR PROCEDURE (OUTPATIENT)
Dept: VASCULAR SURGERY | Age: 75
End: 2025-02-10

## 2025-02-10 VITALS — WEIGHT: 156 LBS | BODY MASS INDEX: 25.18 KG/M2

## 2025-02-10 DIAGNOSIS — Z99.2 ENCOUNTER REGARDING VASCULAR ACCESS FOR DIALYSIS FOR ESRD (HCC): Primary | ICD-10-CM

## 2025-02-10 DIAGNOSIS — N18.6 ENCOUNTER REGARDING VASCULAR ACCESS FOR DIALYSIS FOR ESRD (HCC): Primary | ICD-10-CM

## 2025-02-10 DIAGNOSIS — N18.6 END STAGE RENAL DISEASE (HCC): ICD-10-CM

## 2025-02-10 NOTE — PROGRESS NOTES
145 MG tablet Take 1 tablet by mouth every morning      sacubitril-valsartan (ENTRESTO) 24-26 MG per tablet Take 1 tablet by mouth 2 times daily (Patient not taking: Reported on 2/10/2025) 60 tablet 0    aspirin-acetaminophen-caffeine (EXCEDRIN MIGRAINE) 250-250-65 MG per tablet Take 1 tablet by mouth every 6 hours as needed for Headaches (Patient not taking: Reported on 2/10/2025)       No current facility-administered medications for this visit.     Allergies:  Patient has no known allergies.  Social History     Socioeconomic History    Marital status: Legally      Spouse name: Not on file    Number of children: Not on file    Years of education: Not on file    Highest education level: Not on file   Occupational History    Not on file   Tobacco Use    Smoking status: Former     Current packs/day: 0.25     Types: Cigarettes     Passive exposure: Current    Smokeless tobacco: Never    Tobacco comments:     Quit smoking 4 months ago    Vaping Use    Vaping status: Never Used   Substance and Sexual Activity    Alcohol use: No    Drug use: Yes     Comment: gummies    Sexual activity: Not on file   Other Topics Concern    Not on file   Social History Narrative    Not on file     Social Determinants of Health     Financial Resource Strain: Not on file   Food Insecurity: No Food Insecurity (9/30/2024)    Hunger Vital Sign     Worried About Running Out of Food in the Last Year: Never true     Ran Out of Food in the Last Year: Never true   Transportation Needs: No Transportation Needs (9/30/2024)    PRAPARE - Transportation     Lack of Transportation (Medical): No     Lack of Transportation (Non-Medical): No   Physical Activity: Not on file   Stress: Not on file   Social Connections: Not on file   Intimate Partner Violence: Not on file   Housing Stability: Low Risk  (9/30/2024)    Housing Stability Vital Sign     Unable to Pay for Housing in the Last Year: No     Number of Times Moved in the Last Year: 0

## 2025-02-12 ENCOUNTER — APPOINTMENT (OUTPATIENT)
Dept: OTOLARYNGOLOGY | Facility: CLINIC | Age: 75
End: 2025-02-12
Payer: COMMERCIAL

## 2025-03-17 NOTE — PROGRESS NOTES
[facility]   PRE-ADMISSION TESTING GENERAL INSTRUCTIONS  PAT Phone Number: [phone]      GENERAL INSTRUCTIONS:    [x] Antibacterial Soap Shower Night before AND the Morning of procedure.  [x] Do not wear makeup, lotions, powders, deodorant the morning of surgery.  [x] No solid food after midnight. You may have SIPS of clear liquids up until 2 hours before your arrival time to the hospital.   [x] You may brush your teeth, gargle, but do not swallow water.   [x] No tobacco products, illegal drugs, or alcohol within 24 hours of your surgery.  [x] Jewelry or valuables should not be brought to the hospital. All body and/or tongue piercing's must be removed prior to arriving to hospital. No contact lens or hair pins.   [x] Arrange transportation with a responsible adult  to and from the hospital. Arrange for someone to be with you for the remainder of the day and for 24 hours after your procedure due to having had anesthesia.          -Who will be your  for transportation? Sister in law        -Who will be staying with you for 24 hrs after your procedure? family  [x] Bring insurance card and photo ID.     PARKING INSTRUCTIONS:     [x] ARRIVAL DATE & TIME: 3/21 at 0530  [x] Times are subject to change. We will contact you the business day before surgery if that were to occur.  [x] Enter into the [facility] Entrance. Two people may accompany you. Masks are not required.  [x] Parking Lot \"I\" is where you will park. It is located on the corner of [address] and [address]. The entrance is on [address].   Only one vehicle - per patient, is permitted in parking lot.   Upon entering the parking lot, a voucher ticket will print.    EDUCATION INSTRUCTIONS:           [] [facility] Pamphlet placed in chart.  [] Pre-admission Testing educational folder given.  [] Incentive Spirometry,coughing & deep breathing exercises reviewed.  []

## 2025-03-20 ENCOUNTER — ANESTHESIA EVENT (OUTPATIENT)
Dept: OPERATING ROOM | Age: 75
End: 2025-03-20
Payer: MEDICARE

## 2025-03-20 ASSESSMENT — LIFESTYLE VARIABLES: SMOKING_STATUS: 1

## 2025-03-20 NOTE — ANESTHESIA PRE PROCEDURE
tablet by mouth 2 times daily 60 tablet 3   • rosuvastatin (CRESTOR) 40 MG tablet Take 1 tablet by mouth nightly     • fenofibrate (TRICOR) 145 MG tablet Take 1 tablet by mouth every morning         Allergies:  No Known Allergies    Problem List:    Patient Active Problem List   Diagnosis Code   • Rhabdomyolysis M62.82   • Delirium due to another medical condition F05   • Diabetic foot infection ([facility]) E11.628, L08.9   • Osteomyelitis of great toe of left foot ([facility]) M86.9   • DKA, type 1, not at goal ([facility]) E10.10   • Diabetic acidosis without coma ([facility]) E11.10   • New onset of congestive heart failure ([facility]) I50.9   • Encounter regarding vascular access for dialysis for ESRD ([facility]) N18.6, Z99.2   • End stage renal disease ([facility]) N18.6       Past Medical History:        Diagnosis Date   • Dementia ([facility])    • Depression    • Diabetes mellitus ([facility])    • Hypertension    • Neuropathy        Past Surgical History:        Procedure Laterality Date   • BACK SURGERY     • FOOT DEBRIDEMENT Left 3/25/2021    BONE BIOPSY AND CULTURE, GREAT TOE LEFT FOOT performed by Velasquez Wilcox DPM at [facility] OR   • IR TUNNELED CVC PLACE WO SQ PORT/PUMP > 5 YEARS  10/10/2024    FL TUNNELED CVC PLACE WO SQ PORT/PUMP > 5 YEARS 10/10/2024 [facility] RADIOLOGY   • TOE AMPUTATION Left 3/28/2021    LEFT FOOT TOE AMPUTATION,  LEFT FOOT PARTIAL HALLUX AMPUTATION WITH DEBRIDEMENT OF NON VIABLE TISSUE performed by Velasquez Wilcox DPM at [facility] OR       Social History:    Social History     Tobacco Use   • Smoking status: Former     Current packs/day: 0.25     Types: Cigarettes     Passive exposure: Current   • Smokeless tobacco: Never   • Tobacco comments:     Quit smoking 4 months ago    Substance Use Topics   • Alcohol use: No                                Counseling given: Not Answered  Tobacco comments: Quit smoking 4 months ago       Vital Signs (Current):   Vitals:    03/17/25 1217   Weight: 76.2 kg (168 lb)

## 2025-03-21 ENCOUNTER — ANESTHESIA (OUTPATIENT)
Dept: OPERATING ROOM | Age: 75
End: 2025-03-21
Payer: MEDICARE

## 2025-03-21 ENCOUNTER — HOSPITAL ENCOUNTER (OUTPATIENT)
Age: 75
Setting detail: OUTPATIENT SURGERY
Discharge: HOME | End: 2025-03-21
Attending: SURGERY | Admitting: STUDENT IN AN ORGANIZED HEALTH CARE EDUCATION/TRAINING PROGRAM
Payer: MEDICARE

## 2025-03-21 ENCOUNTER — TELEPHONE (OUTPATIENT)
Dept: VASCULAR SURGERY | Age: 75
End: 2025-03-21

## 2025-03-21 VITALS
HEART RATE: 64 BPM | TEMPERATURE: 97 F | DIASTOLIC BLOOD PRESSURE: 82 MMHG | SYSTOLIC BLOOD PRESSURE: 178 MMHG | RESPIRATION RATE: 17 BRPM | WEIGHT: 168 LBS | BODY MASS INDEX: 27.12 KG/M2 | OXYGEN SATURATION: 95 %

## 2025-03-21 DIAGNOSIS — N18.6 ENCOUNTER REGARDING VASCULAR ACCESS FOR DIALYSIS FOR ESRD (HCC): Primary | ICD-10-CM

## 2025-03-21 DIAGNOSIS — Z01.812 PRE-OPERATIVE LABORATORY EXAMINATION: ICD-10-CM

## 2025-03-21 DIAGNOSIS — Z99.2 ENCOUNTER REGARDING VASCULAR ACCESS FOR DIALYSIS FOR ESRD (HCC): Primary | ICD-10-CM

## 2025-03-21 LAB
ABO + RH BLD: NORMAL
ANION GAP SERPL CALCULATED.3IONS-SCNC: 15 MMOL/L (ref 7–16)
ARM BAND NUMBER: NORMAL
BLOOD BANK SAMPLE EXPIRATION: NORMAL
BLOOD GROUP ANTIBODIES SERPL: NEGATIVE
BUN SERPL-MCNC: 20 MG/DL (ref 6–23)
CALCIUM SERPL-MCNC: 8.5 MG/DL (ref 8.6–10.2)
CHLORIDE SERPL-SCNC: 99 MMOL/L (ref 98–107)
CO2 SERPL-SCNC: 24 MMOL/L (ref 22–29)
CREAT SERPL-MCNC: 3.4 MG/DL (ref 0.7–1.2)
ERYTHROCYTE [DISTWIDTH] IN BLOOD BY AUTOMATED COUNT: 15.9 % (ref 11.5–15)
GFR, ESTIMATED: 19 ML/MIN/1.73M2
GLUCOSE BLD-MCNC: 168 MG/DL (ref 74–99)
GLUCOSE BLD-MCNC: 189 MG/DL (ref 74–99)
GLUCOSE SERPL-MCNC: 168 MG/DL (ref 74–99)
HCT VFR BLD AUTO: 32 % (ref 37–54)
HGB BLD-MCNC: 10.5 G/DL (ref 12.5–16.5)
INR PPP: 1
MCH RBC QN AUTO: 32 PG (ref 26–35)
MCHC RBC AUTO-ENTMCNC: 32.8 G/DL (ref 32–34.5)
MCV RBC AUTO: 97.6 FL (ref 80–99.9)
PLATELET # BLD AUTO: 177 K/UL (ref 130–450)
PMV BLD AUTO: 9.6 FL (ref 7–12)
POTASSIUM SERPL-SCNC: 3.8 MMOL/L (ref 3.5–5)
PROTHROMBIN TIME: 10.5 SEC (ref 9.3–12.4)
RBC # BLD AUTO: 3.28 M/UL (ref 3.8–5.8)
SODIUM SERPL-SCNC: 138 MMOL/L (ref 132–146)
WBC OTHER # BLD: 4.8 K/UL (ref 4.5–11.5)

## 2025-03-21 PROCEDURE — 7100000010 HC PHASE II RECOVERY - FIRST 15 MIN: Performed by: STUDENT IN AN ORGANIZED HEALTH CARE EDUCATION/TRAINING PROGRAM

## 2025-03-21 PROCEDURE — 36821 AV FUSION DIRECT ANY SITE: CPT | Performed by: STUDENT IN AN ORGANIZED HEALTH CARE EDUCATION/TRAINING PROGRAM

## 2025-03-21 PROCEDURE — 2500000003 HC RX 250 WO HCPCS: Performed by: STUDENT IN AN ORGANIZED HEALTH CARE EDUCATION/TRAINING PROGRAM

## 2025-03-21 PROCEDURE — 86901 BLOOD TYPING SEROLOGIC RH(D): CPT

## 2025-03-21 PROCEDURE — 7100000011 HC PHASE II RECOVERY - ADDTL 15 MIN: Performed by: STUDENT IN AN ORGANIZED HEALTH CARE EDUCATION/TRAINING PROGRAM

## 2025-03-21 PROCEDURE — 86850 RBC ANTIBODY SCREEN: CPT

## 2025-03-21 PROCEDURE — 3600000004 HC SURGERY LEVEL 4 BASE: Performed by: STUDENT IN AN ORGANIZED HEALTH CARE EDUCATION/TRAINING PROGRAM

## 2025-03-21 PROCEDURE — 2500000003 HC RX 250 WO HCPCS

## 2025-03-21 PROCEDURE — 6360000002 HC RX W HCPCS: Performed by: ANESTHESIOLOGY

## 2025-03-21 PROCEDURE — 64415 NJX AA&/STRD BRCH PLXS IMG: CPT | Performed by: ANESTHESIOLOGY

## 2025-03-21 PROCEDURE — 3700000000 HC ANESTHESIA ATTENDED CARE: Performed by: STUDENT IN AN ORGANIZED HEALTH CARE EDUCATION/TRAINING PROGRAM

## 2025-03-21 PROCEDURE — 82962 GLUCOSE BLOOD TEST: CPT

## 2025-03-21 PROCEDURE — 2580000003 HC RX 258

## 2025-03-21 PROCEDURE — 85027 COMPLETE CBC AUTOMATED: CPT

## 2025-03-21 PROCEDURE — 6360000002 HC RX W HCPCS

## 2025-03-21 PROCEDURE — 80048 BASIC METABOLIC PNL TOTAL CA: CPT

## 2025-03-21 PROCEDURE — 3600000014 HC SURGERY LEVEL 4 ADDTL 15MIN: Performed by: STUDENT IN AN ORGANIZED HEALTH CARE EDUCATION/TRAINING PROGRAM

## 2025-03-21 PROCEDURE — 86900 BLOOD TYPING SEROLOGIC ABO: CPT

## 2025-03-21 PROCEDURE — 3700000001 HC ADD 15 MINUTES (ANESTHESIA): Performed by: STUDENT IN AN ORGANIZED HEALTH CARE EDUCATION/TRAINING PROGRAM

## 2025-03-21 PROCEDURE — 6360000002 HC RX W HCPCS: Performed by: STUDENT IN AN ORGANIZED HEALTH CARE EDUCATION/TRAINING PROGRAM

## 2025-03-21 PROCEDURE — 2709999900 HC NON-CHARGEABLE SUPPLY: Performed by: STUDENT IN AN ORGANIZED HEALTH CARE EDUCATION/TRAINING PROGRAM

## 2025-03-21 PROCEDURE — 85610 PROTHROMBIN TIME: CPT

## 2025-03-21 RX ORDER — HEPARIN SODIUM 1000 [USP'U]/ML
INJECTION, SOLUTION INTRAVENOUS; SUBCUTANEOUS
Status: DISCONTINUED | OUTPATIENT
Start: 2025-03-21 | End: 2025-03-21 | Stop reason: SDUPTHER

## 2025-03-21 RX ORDER — SODIUM CHLORIDE 9 MG/ML
INJECTION, SOLUTION INTRAVENOUS PRN
Status: DISCONTINUED | OUTPATIENT
Start: 2025-03-21 | End: 2025-03-21 | Stop reason: HOSPADM

## 2025-03-21 RX ORDER — FENTANYL CITRATE 50 UG/ML
50 INJECTION, SOLUTION INTRAMUSCULAR; INTRAVENOUS EVERY 5 MIN PRN
Status: DISCONTINUED | OUTPATIENT
Start: 2025-03-21 | End: 2025-03-21 | Stop reason: HOSPADM

## 2025-03-21 RX ORDER — ROPIVACAINE HYDROCHLORIDE 5 MG/ML
INJECTION, SOLUTION EPIDURAL; INFILTRATION; PERINEURAL
Status: COMPLETED | OUTPATIENT
Start: 2025-03-21 | End: 2025-03-21

## 2025-03-21 RX ORDER — SODIUM CHLORIDE 9 MG/ML
INJECTION, SOLUTION INTRAVENOUS CONTINUOUS
Status: DISCONTINUED | OUTPATIENT
Start: 2025-03-21 | End: 2025-03-21 | Stop reason: HOSPADM

## 2025-03-21 RX ORDER — FENTANYL CITRATE 50 UG/ML
25 INJECTION, SOLUTION INTRAMUSCULAR; INTRAVENOUS PRN
Status: DISCONTINUED | OUTPATIENT
Start: 2025-03-21 | End: 2025-03-21 | Stop reason: HOSPADM

## 2025-03-21 RX ORDER — FENTANYL CITRATE 50 UG/ML
25 INJECTION, SOLUTION INTRAMUSCULAR; INTRAVENOUS EVERY 5 MIN PRN
Status: DISCONTINUED | OUTPATIENT
Start: 2025-03-21 | End: 2025-03-21 | Stop reason: HOSPADM

## 2025-03-21 RX ORDER — ONDANSETRON 2 MG/ML
4 INJECTION INTRAMUSCULAR; INTRAVENOUS
Status: DISCONTINUED | OUTPATIENT
Start: 2025-03-21 | End: 2025-03-21 | Stop reason: HOSPADM

## 2025-03-21 RX ORDER — NALOXONE HYDROCHLORIDE 0.4 MG/ML
INJECTION, SOLUTION INTRAMUSCULAR; INTRAVENOUS; SUBCUTANEOUS PRN
Status: DISCONTINUED | OUTPATIENT
Start: 2025-03-21 | End: 2025-03-21 | Stop reason: HOSPADM

## 2025-03-21 RX ORDER — DEXMEDETOMIDINE HYDROCHLORIDE 100 UG/ML
INJECTION, SOLUTION INTRAVENOUS
Status: DISCONTINUED | OUTPATIENT
Start: 2025-03-21 | End: 2025-03-21 | Stop reason: SDUPTHER

## 2025-03-21 RX ORDER — LIDOCAINE HYDROCHLORIDE 20 MG/ML
INJECTION, SOLUTION INTRAVENOUS
Status: DISCONTINUED | OUTPATIENT
Start: 2025-03-21 | End: 2025-03-21 | Stop reason: SDUPTHER

## 2025-03-21 RX ORDER — SODIUM CHLORIDE 0.9 % (FLUSH) 0.9 %
5-40 SYRINGE (ML) INJECTION PRN
Status: DISCONTINUED | OUTPATIENT
Start: 2025-03-21 | End: 2025-03-21 | Stop reason: HOSPADM

## 2025-03-21 RX ORDER — MIDAZOLAM HYDROCHLORIDE 2 MG/2ML
0.5 INJECTION, SOLUTION INTRAMUSCULAR; INTRAVENOUS PRN
Status: DISCONTINUED | OUTPATIENT
Start: 2025-03-21 | End: 2025-03-21 | Stop reason: HOSPADM

## 2025-03-21 RX ORDER — ROPIVACAINE HYDROCHLORIDE 5 MG/ML
30 INJECTION, SOLUTION EPIDURAL; INFILTRATION; PERINEURAL
Status: DISCONTINUED | OUTPATIENT
Start: 2025-03-21 | End: 2025-03-21 | Stop reason: HOSPADM

## 2025-03-21 RX ORDER — PROPOFOL 10 MG/ML
INJECTION, EMULSION INTRAVENOUS
Status: DISCONTINUED | OUTPATIENT
Start: 2025-03-21 | End: 2025-03-21 | Stop reason: SDUPTHER

## 2025-03-21 RX ORDER — SODIUM CHLORIDE 0.9 % (FLUSH) 0.9 %
5-40 SYRINGE (ML) INJECTION EVERY 12 HOURS SCHEDULED
Status: DISCONTINUED | OUTPATIENT
Start: 2025-03-21 | End: 2025-03-21 | Stop reason: HOSPADM

## 2025-03-21 RX ORDER — OXYCODONE AND ACETAMINOPHEN 5; 325 MG/1; MG/1
1 TABLET ORAL EVERY 6 HOURS PRN
Qty: 20 TABLET | Refills: 0 | Status: SHIPPED | OUTPATIENT
Start: 2025-03-21 | End: 2025-03-26

## 2025-03-21 RX ORDER — SODIUM CHLORIDE 9 MG/ML
INJECTION, SOLUTION INTRAVENOUS
Status: DISCONTINUED | OUTPATIENT
Start: 2025-03-21 | End: 2025-03-21 | Stop reason: SDUPTHER

## 2025-03-21 RX ADMIN — DEXMEDETOMIDINE HCL 12 MCG: 100 INJECTION INTRAVENOUS at 07:22

## 2025-03-21 RX ADMIN — PROPOFOL 100 MCG/KG/MIN: 10 INJECTION, EMULSION INTRAVENOUS at 07:22

## 2025-03-21 RX ADMIN — ROPIVACAINE HYDROCHLORIDE 25 ML: 5 INJECTION EPIDURAL; INFILTRATION; PERINEURAL at 06:45

## 2025-03-21 RX ADMIN — HEPARIN SODIUM 5000 UNITS: 1000 INJECTION INTRAVENOUS; SUBCUTANEOUS at 07:45

## 2025-03-21 RX ADMIN — FENTANYL CITRATE 50 MCG: 50 INJECTION INTRAMUSCULAR; INTRAVENOUS at 06:57

## 2025-03-21 RX ADMIN — SODIUM CHLORIDE: 9 INJECTION, SOLUTION INTRAVENOUS at 07:03

## 2025-03-21 RX ADMIN — CEFAZOLIN 2000 MG: 2 INJECTION, POWDER, FOR SOLUTION INTRAMUSCULAR; INTRAVENOUS at 07:29

## 2025-03-21 RX ADMIN — LIDOCAINE HYDROCHLORIDE 30 MG: 20 INJECTION, SOLUTION INTRAVENOUS at 07:22

## 2025-03-21 RX ADMIN — MIDAZOLAM HYDROCHLORIDE 2 MG: 1 INJECTION, SOLUTION INTRAMUSCULAR; INTRAVENOUS at 06:57

## 2025-03-21 ASSESSMENT — PAIN SCALES - GENERAL
PAINLEVEL_OUTOF10: 0

## 2025-03-21 ASSESSMENT — PAIN - FUNCTIONAL ASSESSMENT: PAIN_FUNCTIONAL_ASSESSMENT: 0-10

## 2025-03-21 NOTE — H&P
Vascular Surgery History & Physical Exam      Chief Complaint:  ESRD    HISTORY OF PRESENT ILLNESS:                The patient is a 74 y.o. male who presents to the hospital for elective creation of a arteriovenous fistula, possible graft.  The patient denies any problems since the last office visit.    IMPRESSION:   ESRD    PLAN:  Creation of a left upper extremity arteriovenous fistula, possible graft    I reviewed the procedure with the patient and family as available.  I discussed the procedure, risks, benefits, complications, and alternatives of the procedure.  They understand and consent.  All questions were answered    ROS : All others Negative if blank [], Positive if [x]  General   [] Fevers   [] Chills   [] Weight Loss   Skin   [] Tissue Loss   Eyes   [x] Wears Glasses/Contacts   [] Vision Changes   Respiratory    [] Shortness of breath   Cardiovascular   [] Chest Pain   [] Shortness of breath with exertion   Gastrointestinal   [] Abdominal Pain     Past Medical History:   Diagnosis Date    Dementia (HCC)     Depression     Diabetes mellitus (HCC)     Hypertension     Neuropathy      Past Surgical History:   Procedure Laterality Date    BACK SURGERY      FOOT DEBRIDEMENT Left 3/25/2021    BONE BIOPSY AND CULTURE, GREAT TOE LEFT FOOT performed by Velasquez Wilcox DPM at [facility] OR    IR TUNNELED CVC PLACE WO SQ PORT/PUMP > 5 YEARS  10/10/2024    FL TUNNELED CVC PLACE WO SQ PORT/PUMP > 5 YEARS 10/10/2024 [facility] RADIOLOGY    TOE AMPUTATION Left 3/28/2021    LEFT FOOT TOE AMPUTATION,  LEFT FOOT PARTIAL HALLUX AMPUTATION WITH DEBRIDEMENT OF NON VIABLE TISSUE performed by Velasquez Wilcox DPM at [facility] OR     Current Medications:     Current Facility-Administered Medications:     0.9 % sodium chloride infusion, , IntraVENous, Continuous, Roger Hughes APRN - CNP    sodium chloride flush 0.9 % injection 5-40 mL, 5-40 mL, IntraVENous, 2 times per day, Roger Hughes, ANDRAE - CNP    sodium chloride flush 0.9 %  Sign     Unable to Pay for Housing in the Last Year: No     Number of Times Moved in the Last Year: 0     Homeless in the Last Year: No     History reviewed. No pertinent family history.    REVIEW OF SYSTEMS:  The chart was reviewed.    PHYSICAL EXAM:    Vitals:    03/21/25 0540   BP: (!) 167/99   Pulse: 97   Resp: 20   Temp: 97.8 °F (36.6 °C)   SpO2: 96%     CONSTITUTIONAL:  awake, alert, cooperative, no apparent distress, and appears stated age  NECK:  supple, symmetrical, trachea midline  LUNGS:  no increased work of breathing, good resp excursion  CARDIOVASCULAR:  S1S2   ABDOMEN:  soft, non-distended and non-tender  left upper extremity   Brachial 2+ Radial 1+    LABS:    Lab Results   Component Value Date    WBC 4.8 03/21/2025    HGB 10.5 (L) 03/21/2025    HCT 32.0 (L) 03/21/2025     03/21/2025    PROTIME 10.5 03/21/2025    INR 1.0 03/21/2025    APTT 36.4 (H) 10/09/2024    K 3.8 03/21/2025    BUN 20 03/21/2025    CREATININE 3.4 (H) 03/21/2025       Margaret Buclkey MD

## 2025-03-21 NOTE — ANESTHESIA POSTPROCEDURE EVALUATION
Department of Anesthesiology  Postprocedure Note    Patient: Santosh Thomas  MRN: [id number]  YOB: 1950  Date of evaluation: 3/21/2025    Procedure Summary       Date: 03/21/25 Room / Location: [address]    Anesthesia Start: 0714 Anesthesia Stop: 0840    Procedure: LEFT UPPER EXTREMITY ARTERIOVENOUS FISTULA (Left: Arm Lower) Diagnosis:       End stage renal disease (HCC)      (End stage renal disease (HCC) [N18.6])    Surgeons: Johnny Overton MD Responsible Provider: Lucas Adams MD    Anesthesia Type: MAC ASA Status: 4            Anesthesia Type: MAC    Aura Phase I: Aura Score: 10    Aura Phase II: Aura Score: 10    Anesthesia Post Evaluation    Patient location during evaluation: PACU  Patient participation: complete - patient participated  Level of consciousness: awake and alert  Pain score: 2  Airway patency: patent  Nausea & Vomiting: no nausea and no vomiting  Cardiovascular status: blood pressure returned to baseline  Respiratory status: acceptable  Hydration status: euvolemic  Pain management: adequate    No notable events documented.

## 2025-03-21 NOTE — ANESTHESIA PROCEDURE NOTES
Peripheral Block    Patient location during procedure: pre-op  Reason for block: post-op pain management and at surgeon's request  Start time: 3/21/2025 6:45 AM  End time: 3/21/2025 6:55 AM  Staffing  Performed: anesthesiologist   Anesthesiologist: Lucas Adams MD  Performed by: Lucas Adams MD  Authorized by: Lucas Adams MD    Preanesthetic Checklist  Completed: patient identified, IV checked, site marked, risks and benefits discussed, surgical/procedural consents, equipment checked, pre-op evaluation, timeout performed, anesthesia consent given, oxygen available and monitors applied/VS acknowledged  Peripheral Block   Patient position: supine  Prep: ChloraPrep  Provider prep: mask and sterile gloves  Patient monitoring: cardiac monitor, continuous pulse ox, frequent blood pressure checks and IV access  Block type: Brachial plexus  Supraclavicular  Laterality: left  Injection technique: single-shot  Guidance: ultrasound guided  Local infiltration: lidocaine  Infiltration strength: 1 %  Local infiltration: lidocaine  Dose: 3 mL    Needle   Needle type: insulated echogenic nerve stimulator needle   Needle gauge: 21 G  Needle localization: ultrasound guidance  Needle insertion depth: 4 cm  Needle length: 5 cm  Assessment   Injection assessment: negative aspiration for heme, no paresthesia on injection and local visualized surrounding nerve on ultrasound  Paresthesia pain: none  Slow fractionated injection: yes  Hemodynamics: stable  Outcomes: patient tolerated procedure well and uncomplicated    Additional Notes  U/S [id number].  Timeout performed          Medications Administered  ropivacaine (NAROPIN) injection 0.5% - Perineural   25 mL - 3/21/2025 6:45:00 AM

## 2025-03-21 NOTE — DISCHARGE INSTRUCTIONS
Elevate Left upper extremity at all times  Keep left arm sling on until tomorrow.  You may remove tomorrow and discard in the trash.     [facility] AMBULATORY PROCEDURE DISCHARGE INSTRUCTIONS  You may be drowsy or lightheaded after receiving sedation or anesthesia.    A responsible person should be with you for the next 24 hours.    Please follow the instructions checked below:    DIET INSTRUCTIONS:  [x]Start with light diet and progress to your normal diet as you feel like eating. If you experience nausea or repeated episodes of vomiting which persist beyond 12-24 hours, notify your doctor.  []Other     ACTIVITY INSTRUCTIONS:  [x]Rest today. Increase activity as tolerated    [x]Elevate operative limb   [x]No heavy lifting or strenuous activity     [x]No driving for 2 days  []Other     WOUND/DRESSING INSTRUCTIONS:  Always ensure you and your care giver clean hands before and after caring for the wound.  []May shower      [x]May bathe      [x]Derma bond dressing-Do not apply lotion, gel, or liquid to wound while the derma bond is in place.   []Other         MEDICATION INSTRUCTIONS:  [x]Prescriptions sent with you.  Use as directed.  When taking pain medications, you may experience dizziness or drowsiness.  Do not drink alcohol or drive when taking these medications.  [x]You may take a non-prescription “headache remedy”, preferably one that does not contain aspirin.    FOLLOW-UP CARE:  [x]Call the office at [phone] for follow-up appointment in 3 weeks    Call physician if they or any other problems occur:  Fever over 101°    Redness, swelling, hardness or warmth at the operative site  Swelling of arm  Unrelieved nausea    Foul smelling or cloudy drainage at the operative site   Unrelieved pain    Blood soaked dressing. (Some oozing may be normal)  Pain in Hand  Blue or black fingers    Resume home medications as prescribed by physician

## 2025-03-21 NOTE — OP NOTE
Operative Note      Patient: Santosh Thomas  YOB: 1950  MRN: [id number]    Date of Procedure: 3/21/2025    Pre-Op Diagnosis Codes:      * End stage renal disease (HCC) [N18.6]    Post-Op Diagnosis: Same       Procedure(s):  LEFT UPPER EXTREMITY ARTERIOVENOUS FISTULA    Surgeon(s):  Margaret Buckley MD Chen, Winsor, MD    Assistant:   Surgical Assistant: Alex Bright    Anesthesia: Regional    Estimated Blood Loss (mL): Minimal    Complications: None    Specimens:   * No specimens in log *    Implants:  * No implants in log *      Drains: * No LDAs found *    Findings:  Infection Present At Time Of Surgery (PATOS) (choose all levels that have infection present):  No infection present  Other Findings: Excellent quality cephalic vein, excellent quality radial artery, inside anastomosis created which was 15 mm in length, at completion there was good thrill with palpable pulse distally and palmar arch signal.  Dorsal wrist branch was ligated x 2.    Detailed Description of Procedure:   Patient was consented for procedure, the risks and benefits were explained at length.  Patient was brought back to the operating room, anesthesia was induced.  Perioperative antibiotics were infused.  Field was prepped and draped in sterile fashion.  Timeout performed.  Longitudinal incision made overlying the course of the flexor carpi radialis tendon, incision deepened.  Cephalic vein dissected circumferentially sharply and controlled.  Volar aponeurosis opened, radial artery dissected circumferentially sharply and controlled proximally and distally.  5000 units of parenteral heparin was administered.  After adequate circulation time, vein was transected distally, distal stump ligated with silk.  Vein was flushed, dilated nicely.  Artery was occluded proximally and distally.  Arteriotomy was created, total length 14-15 mm.  Vein was spatulated appropriately and then end-to-side anastomosis was created using

## 2025-03-21 NOTE — PROGRESS NOTES
CLINICAL PHARMACY NOTE: MEDS TO BEDS    Total # of Prescriptions Filled: 1   The following medications were delivered to the patient:  Percocet 5-325mg    Additional Documentation:  Patient's sister-in-law picked up in pharmacy 3-21-25

## 2025-03-24 NOTE — PROGRESS NOTES
Already filled    Occupational Therapy  OT BEDSIDE TREATMENT NOTE      Date:10/2/2024  Patient Name: Santosh Thomas  MRN: [id number]  : 1950  Room: [address] [place] [address]        Evaluating OT: Dee Dee Gonzalez, OTR/SARAH - [id number]     Referring Provider: Devaughn Reed DO  Specific Provider Orders/Date: \"OT eval and treat\" - 10/1/2024     Diagnosis: Hypomagnesemia [E83.42]  New onset of congestive heart failure (HCC) [I50.9]  Acute renal failure superimposed on chronic kidney disease, unspecified acute renal failure type, unspecified CKD stage (HCC) [N17.9, N18.9]      Pertinent Medical History: dementia, depression, DM, HTN, neuropathy      Precautions: fall risk     Assessment of Current Deficits:    [x] Functional mobility             [x] ADLs          [x] Strength                  [x] Cognition   [x] Functional transfers           [x] IADLs         [x] Safety Awareness   [x] Endurance   [] Fine Motor Coordination    [x] Balance      [] Vision/Perception   [x] Sensation    [] Gross Motor Coordination [] ROM          [] Delirium                  [] Motor Control      OT PLAN OF CARE   OT POC is based on physician orders, patient diagnosis, and results of clinical assessment.  Frequency/Duration 2-5 days/week for 2-4 weeks PRN   Specific OT Treatment Interventions to Include:   * Instruction/training on adapted ADL techniques and AE recommendations to increase functional independence within precautions       * Training on energy conservation strategies, correct breathing pattern and techniques to improve independence/tolerance for self-care routine  * Functional transfer/mobility training/DME recommendations for increased independence, safety, and fall prevention  * Patient/Family education to increase follow through with safety techniques and functional independence  * Recommendation of environmental modifications for increased safety with functional transfers/mobility and ADLs  * Cognitive retraining/development of therapeutic  activities to improve problem solving, judgement, memory, and attention for increased safety/participation in ADL/IADL tasks  * Therapeutic exercise to improve motor endurance, ROM, and functional strength for ADLs/functional transfers  * Therapeutic activities to facilitate/challenge dynamic balance, stand tolerance for increased safety and independence with ADLs  * Neuro-muscular re-education: facilitation of righting/equilibrium reactions, midline orientation, scapular stability/mobility, normalization of muscle tone, and facilitation of volitional active controled movement  * Positioning to improve skin integrity, interaction with environment and functional independence     Recommended Adaptive Equipment: Shower Chair     Home Living: Patient lives alone in a one-floor apartment (several steps down to access apartment from building entry); laundry facilities are adjacent to patient's apartment.  Bathroom Setup: tub shower (with grab bar)  Equipment Owned: cane     Prior Level of Function (PLOF): Patient is typically independent with ADLs, IADLs, and functional mobility (without device). Patient has had difficulty with medication management recently, per review of patient's medical record.  Driving: Yes     Pain Level: Pt reporting pain in LE's   Cognition: Pt pleasant and cooperative.      Functional Assessment:                  AM-PAC Daily Activity Raw Score: 17/24    Initial Eval Status  Date: 10/1/2024 Treatment Status  Date: 10/2/24  Short Term Goals = Long Term Goals   Feeding Independent   N/A   Grooming SBA SBA   Limited stand tolerance for ADL.   Mod I / Independent (seated/standing at sink)   UB Dressing Setup   Mod I / Independent (including item retrieval)   LB Dressing Mod A Assist to thread clothing over feet.  Able to arrange over hips when standing.     Mod I / Independent - with use of AE, as needed/appropriate   Bathing Mod A   Mod I / Independent - with use of AE/DME, as needed/appropriate    Toileting Min A Declined need during this session.   Mod I / Independent   Bed Mobility  Supine-to-Sit: SBA    Min A supine to sit   Independent in order to maximize patient's independence/participation with ADLs, re-positioning, and other functional tasks.   Functional Transfers Sit-to-Stand: SBA   from EOB Min A to stand from bed surface.   CGA from chair.   Independent   Functional Mobility SBA (with walker) within patient's room and hallway. CGA using w/w.  Limited tolerance due to pt complaint of LE pain.     Mod I / Independent with functional mobility (with device, as needed/appropriate) in order to maximize independence with ADLs/IADLs and other functional tasks.   Balance Sitting: Good (at EOB)  Standing: Fair to Fair+ (with walker)   Fair+ to Good- dynamic standing balance during completion of ADLs/IADLs and other functional tasks.   Activity Tolerance Fair+   Patient will demonstrate Good understanding and consistent implementation of energy conservation techniques and work simplification techniques into ADL/IADL routines.   Visual/  Perceptual WFL      N/A   B UE Strength, ROM B UE ROM: WFL  B UE Strength: 4/5 grossly Instructed with AROM while seated in the chair.  Good demonstration noted.   Patient will demonstrate 5/5 B UE strength in order to maximize independence with ADLs/IADLs and functional transfers.     Comments:  Pt laying in the bed. Agreeable to therapy.  States he feels better than yesterday however complaint of LE's aching and increased level of assistance for transfers.  He completed functional mobility using w/w with limited tolerance.  Pt remained seated in the chair with alarm activated.  Nursing notified.    Education/treatment:  ADL retraining with facilitation of movement to increase self care skills. Therapeutic activity to address balance and endurance for ADL and transfers.  Pt education of walker safety, transfer safety, energy conservation.       Pt has made  progress

## 2025-04-14 ENCOUNTER — OFFICE VISIT (OUTPATIENT)
Dept: VASCULAR SURGERY | Age: 75
End: 2025-04-14

## 2025-04-14 DIAGNOSIS — Z99.2 ENCOUNTER REGARDING VASCULAR ACCESS FOR DIALYSIS FOR ESRD (HCC): Primary | ICD-10-CM

## 2025-04-14 DIAGNOSIS — N18.6 ENCOUNTER REGARDING VASCULAR ACCESS FOR DIALYSIS FOR ESRD (HCC): Primary | ICD-10-CM

## 2025-04-14 PROCEDURE — 99024 POSTOP FOLLOW-UP VISIT: CPT | Performed by: SURGERY

## 2025-04-14 RX ORDER — DOXYCYCLINE HYCLATE 100 MG
100 TABLET ORAL 2 TIMES DAILY
Qty: 20 TABLET | Refills: 0 | Status: SHIPPED | OUTPATIENT
Start: 2025-04-14 | End: 2025-04-24

## 2025-04-14 NOTE — PROGRESS NOTES
4/14/2025    Santosh Thomas  1950    PCP : Devaughn Reed DO  Nephrologist : Dr Antoine  Dialysis Center : [facility]    Previous Vascular Access Procedures  3/21/25 L RC AVF     Patient returns for post operative evaluation.    The patient denies any unexpected problems since the procedure.  The wound is spitting sutures.  There is a small open wound.  Denies left hand pain.       PE  Gen NAD Awake and Alert  left Upper Extremity  Fistula has palpable thrill, and a bruit is auscultated  Maturing, ~5 mm, 3 large branches noted on US  Brachial 2+ Radial 2+ Ulnar biphasic  Palmar biphasic  Wound is open with erythema, scab removed  No deficits in sensation or motor      A/P Dialysis Access  left RC AVF  wound is open at the wrist  Advised to keep clean and dry  Cover with alginate and dry dressing and change daily  Script for doxy sent to pts pharmacy  no evidence of steal syndrome  av access is maturing well  There are 3 large branches noted on US that may need addressed in the future  I reviewed with the patient that normal activities can be resumed as tolerated  Return in one week for follow-up office visit to evaluate the wound    Pt seen and plan reviewed with Dr. Marcio Hughes, APRN - CNP

## 2025-04-17 ENCOUNTER — APPOINTMENT (OUTPATIENT)
Dept: GENERAL RADIOLOGY | Age: 75
End: 2025-04-17
Payer: MEDICARE

## 2025-04-17 ENCOUNTER — HOSPITAL ENCOUNTER (EMERGENCY)
Age: 75
Discharge: HOME | End: 2025-04-17
Attending: EMERGENCY MEDICINE
Payer: MEDICARE

## 2025-04-17 ENCOUNTER — APPOINTMENT (OUTPATIENT)
Dept: CT IMAGING | Age: 75
End: 2025-04-17
Payer: MEDICARE

## 2025-04-17 VITALS
RESPIRATION RATE: 20 BRPM | DIASTOLIC BLOOD PRESSURE: 79 MMHG | BODY MASS INDEX: 28.08 KG/M2 | TEMPERATURE: 97.6 F | HEART RATE: 86 BPM | OXYGEN SATURATION: 93 % | WEIGHT: 174 LBS | SYSTOLIC BLOOD PRESSURE: 131 MMHG

## 2025-04-17 DIAGNOSIS — R55 SYNCOPE AND COLLAPSE: Primary | ICD-10-CM

## 2025-04-17 DIAGNOSIS — S09.90XA CLOSED HEAD INJURY, INITIAL ENCOUNTER: ICD-10-CM

## 2025-04-17 DIAGNOSIS — N18.6 ESRD (END STAGE RENAL DISEASE) (HCC): ICD-10-CM

## 2025-04-17 LAB
ANION GAP SERPL CALCULATED.3IONS-SCNC: 13 MMOL/L (ref 7–16)
BASOPHILS # BLD: 0.04 K/UL (ref 0–0.2)
BASOPHILS NFR BLD: 1 % (ref 0–2)
BUN SERPL-MCNC: 10 MG/DL (ref 6–23)
CALCIUM SERPL-MCNC: 8.9 MG/DL (ref 8.6–10.2)
CHLORIDE SERPL-SCNC: 99 MMOL/L (ref 98–107)
CO2 SERPL-SCNC: 24 MMOL/L (ref 22–29)
CREAT SERPL-MCNC: 2.5 MG/DL (ref 0.7–1.2)
EKG ATRIAL RATE: 85 BPM
EKG P AXIS: 22 DEGREES
EKG P-R INTERVAL: 204 MS
EKG Q-T INTERVAL: 436 MS
EKG QRS DURATION: 128 MS
EKG QTC CALCULATION (BAZETT): 518 MS
EKG R AXIS: -149 DEGREES
EKG T AXIS: -12 DEGREES
EKG VENTRICULAR RATE: 85 BPM
EOSINOPHIL # BLD: 0.34 K/UL (ref 0.05–0.5)
EOSINOPHILS RELATIVE PERCENT: 5 % (ref 0–6)
ERYTHROCYTE [DISTWIDTH] IN BLOOD BY AUTOMATED COUNT: 15.4 % (ref 11.5–15)
GFR, ESTIMATED: 27 ML/MIN/1.73M2
GLUCOSE BLD-MCNC: 126 MG/DL (ref 74–99)
GLUCOSE SERPL-MCNC: 137 MG/DL (ref 74–99)
HCT VFR BLD AUTO: 36.7 % (ref 37–54)
HGB BLD-MCNC: 11.8 G/DL (ref 12.5–16.5)
IMM GRANULOCYTES # BLD AUTO: 0.04 K/UL (ref 0–0.58)
IMM GRANULOCYTES NFR BLD: 1 % (ref 0–5)
LYMPHOCYTES NFR BLD: 1.24 K/UL (ref 1.5–4)
LYMPHOCYTES RELATIVE PERCENT: 18 % (ref 20–42)
MCH RBC QN AUTO: 32.2 PG (ref 26–35)
MCHC RBC AUTO-ENTMCNC: 32.2 G/DL (ref 32–34.5)
MCV RBC AUTO: 100.3 FL (ref 80–99.9)
MONOCYTES NFR BLD: 0.55 K/UL (ref 0.1–0.95)
MONOCYTES NFR BLD: 8 % (ref 2–12)
NEUTROPHILS NFR BLD: 68 % (ref 43–80)
NEUTS SEG NFR BLD: 4.63 K/UL (ref 1.8–7.3)
PLATELET # BLD AUTO: 179 K/UL (ref 130–450)
PMV BLD AUTO: 9.7 FL (ref 7–12)
POTASSIUM SERPL-SCNC: 3.5 MMOL/L (ref 3.5–5)
RBC # BLD AUTO: 3.66 M/UL (ref 3.8–5.8)
SODIUM SERPL-SCNC: 136 MMOL/L (ref 132–146)
TROPONIN I SERPL HS-MCNC: 130 NG/L (ref 0–22)
TROPONIN I SERPL HS-MCNC: 133 NG/L (ref 0–22)
WBC OTHER # BLD: 6.8 K/UL (ref 4.5–11.5)

## 2025-04-17 PROCEDURE — 70450 CT HEAD/BRAIN W/O DYE: CPT

## 2025-04-17 PROCEDURE — 99285 EMERGENCY DEPT VISIT HI MDM: CPT

## 2025-04-17 PROCEDURE — 71045 X-RAY EXAM CHEST 1 VIEW: CPT

## 2025-04-17 PROCEDURE — 93005 ELECTROCARDIOGRAM TRACING: CPT | Performed by: EMERGENCY MEDICINE

## 2025-04-17 PROCEDURE — 93010 ELECTROCARDIOGRAM REPORT: CPT | Performed by: INTERNAL MEDICINE

## 2025-04-17 PROCEDURE — 85025 COMPLETE CBC W/AUTO DIFF WBC: CPT

## 2025-04-17 PROCEDURE — 84484 ASSAY OF TROPONIN QUANT: CPT

## 2025-04-17 PROCEDURE — 80048 BASIC METABOLIC PNL TOTAL CA: CPT

## 2025-04-17 PROCEDURE — 82962 GLUCOSE BLOOD TEST: CPT

## 2025-04-17 ASSESSMENT — ENCOUNTER SYMPTOMS
ABDOMINAL PAIN: 0
EYE REDNESS: 0
BACK PAIN: 0
EYE PAIN: 0
SHORTNESS OF BREATH: 0
VOMITING: 0
WHEEZING: 0
EYE DISCHARGE: 0
SORE THROAT: 0
COUGH: 0
SINUS PRESSURE: 0
DIARRHEA: 0
NAUSEA: 0

## 2025-04-17 ASSESSMENT — LIFESTYLE VARIABLES
HOW OFTEN DO YOU HAVE A DRINK CONTAINING ALCOHOL: NEVER
HOW MANY STANDARD DRINKS CONTAINING ALCOHOL DO YOU HAVE ON A TYPICAL DAY: PATIENT DOES NOT DRINK

## 2025-04-17 ASSESSMENT — PAIN - FUNCTIONAL ASSESSMENT
PAIN_FUNCTIONAL_ASSESSMENT: 0-10
PAIN_FUNCTIONAL_ASSESSMENT: ACTIVITIES ARE NOT PREVENTED

## 2025-04-17 ASSESSMENT — PAIN SCALES - GENERAL: PAINLEVEL_OUTOF10: 8

## 2025-04-17 ASSESSMENT — PAIN DESCRIPTION - LOCATION: LOCATION: HEAD
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
## 2025-04-17 NOTE — DISCHARGE INSTR - COC
Continuity of Care Form    Patient Name: Santosh Thomas   :  1950  MRN:  [id number]    Admit date:  2025  Discharge date:  ***    Code Status Order: Prior   Advance Directives:     Admitting Physician:  No admitting provider for patient encounter.  PCP: Devaughn Reed DO    Discharging Nurse: ***  Discharging Hospital Unit/Room#:   Discharging Unit Phone Number: ***    Emergency Contact:   Extended Emergency Contact Information  Primary Emergency Contact: Latonia Thomas  Home Phone: [phone]  Relation: Ex-Spouse  Secondary Emergency Contact: Lou Tejada CALL FIRST  Mobile Phone: [phone]  Relation: Brother/Sister    Past Surgical History:  Past Surgical History:   Procedure Laterality Date    BACK SURGERY      DIALYSIS FISTULA CREATION Left 3/21/2025    LEFT UPPER EXTREMITY ARTERIOVENOUS FISTULA performed by Johnny Overton MD at [facility] OR    FOOT DEBRIDEMENT Left 3/25/2021    BONE BIOPSY AND CULTURE, GREAT TOE LEFT FOOT performed by Velasquez Wilcox DPM at [facility] OR    IR TUNNELED CVC PLACE WO SQ PORT/PUMP > 5 YEARS  10/10/2024    FL TUNNELED CVC PLACE WO SQ PORT/PUMP > 5 YEARS 10/10/2024 [facility] RADIOLOGY    TOE AMPUTATION Left 3/28/2021    LEFT FOOT TOE AMPUTATION,  LEFT FOOT PARTIAL HALLUX AMPUTATION WITH DEBRIDEMENT OF NON VIABLE TISSUE performed by Velasquez Wilcox DPM at [facility] OR       Immunization History:   Immunization History   Administered Date(s) Administered    COVID-19, MODERNA, , (age 12y+), IM, 50mcg/0.5mL 2023, 09/10/2024    COVID-19, PFIZER Bivalent, DO NOT Dilute, (age 12y+), IM, 30 mcg/0.3 mL 10/10/2022    COVID-19, PFIZER PURPLE top, DILUTE for use, (age 12 y+), 30mcg/0.3mL 2021, 2022       Active Problems:  Patient Active Problem List   Diagnosis Code    Rhabdomyolysis M62.82    Delirium due to another medical condition F05    Diabetic foot infection (HCC) E11.628, L08.9    Osteomyelitis of great toe of left foot (HCC) M86.9    DKA, type 1, not
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
## 2025-04-17 NOTE — ED NOTES
Pt ambulated in [place] with cane- steady and feels safe to go home- spo2 95% on ra- dr yao notified   2

## 2025-04-21 ENCOUNTER — OFFICE VISIT (OUTPATIENT)
Dept: VASCULAR SURGERY | Age: 75
End: 2025-04-21

## 2025-04-21 DIAGNOSIS — N18.6 ENCOUNTER REGARDING VASCULAR ACCESS FOR DIALYSIS FOR ESRD (HCC): Primary | ICD-10-CM

## 2025-04-21 DIAGNOSIS — Z99.2 ENCOUNTER REGARDING VASCULAR ACCESS FOR DIALYSIS FOR ESRD (HCC): Primary | ICD-10-CM

## 2025-04-21 PROCEDURE — 99024 POSTOP FOLLOW-UP VISIT: CPT | Performed by: SURGERY

## 2025-04-21 NOTE — PROGRESS NOTES
4/21/2025    Santosh Thomas  1950    PCP : Devaughn Reed DO  Nephrologist : Dr Antoine  Dialysis Center : [facility]    Previous Vascular Access Procedures  3/21/25 L RC AVF     Patient returns for post operative evaluation.    The patient denies any unexpected problems since the procedure.  When seen last week there was a small wound present along the incision. He has been dressing the wound with aquacel and dry dressing, changing daily. He and his sister-in-law feel the wound has improved. He is taking his doxycycline. Denies purulent drainage, fevers or chills.    PE  Gen NAD Awake and Alert  left Upper Extremity  Fistula has palpable thrill, and a bruit is auscultated  Maturing, ~5 mm, 3 large branches noted on US  Brachial 2+ Radial 2+  Incisional wound is very superficial without surrounding erythema or drainage  No deficits in sensation or motor      A/P Dialysis Access  left RC AVF  Wound improving  Advised to keep clean and dry  Cover with alginate and dry dressing and change daily  Finish doxy  no evidence of steal syndrome  av access is maturing well  There are 3 large branches noted on US that may need addressed in the future  I reviewed with the patient that normal activities can be resumed as tolerated  Patient and sister-in-law will continue to observe the wound and do daily dressing changes  S/S of infection discussed and instruction to call should this occur  FU 2 weeks for wound re-eval and to discuss potential branch ligation    Zita Sapp PA-C

## 2025-05-05 ENCOUNTER — TELEPHONE (OUTPATIENT)
Dept: VASCULAR SURGERY | Age: 75
End: 2025-05-05

## 2025-05-05 ENCOUNTER — PREP FOR PROCEDURE (OUTPATIENT)
Dept: VASCULAR SURGERY | Age: 75
End: 2025-05-05

## 2025-05-05 ENCOUNTER — OFFICE VISIT (OUTPATIENT)
Dept: VASCULAR SURGERY | Age: 75
End: 2025-05-05

## 2025-05-05 DIAGNOSIS — Z99.2 ENCOUNTER REGARDING VASCULAR ACCESS FOR DIALYSIS FOR ESRD (HCC): Primary | ICD-10-CM

## 2025-05-05 DIAGNOSIS — N18.6 ENCOUNTER REGARDING VASCULAR ACCESS FOR DIALYSIS FOR ESRD (HCC): Primary | ICD-10-CM

## 2025-05-05 PROCEDURE — 99024 POSTOP FOLLOW-UP VISIT: CPT | Performed by: SURGERY

## 2025-05-05 NOTE — TELEPHONE ENCOUNTER
Left message for patient to call office to schedule revision AVF left arm (branch ligation). Dialysis [facility].

## 2025-05-05 NOTE — PROGRESS NOTES
5/5/2025    Santosh Thomas  1950    PCP : Devaughn Reed DO  Nephrologist : Dr Antoine  Dialysis Center : [facility]    Previous Vascular Access Procedures  3/21/25 L RC AVF     Patient returns for post operative evaluation.  When seen last there was a small wound present along the incision. He has been dressing the wound with aquacel and dry dressing, changing daily. He and his sister-in-law feel the wound has improved. He. Denies purulent drainage, fevers or chills.    PE  Gen NAD Awake and Alert  left Upper Extremity  Fistula has palpable thrill, and a bruit is auscultated but diminished mid forearm  Maturing, ~5 mm, 3 large branches noted on US  Brachial 2+ Radial 2+  Incisional wound is very superficial without surrounding erythema or drainage- nearly healed  No deficits in sensation or motor      A/P Dialysis Access  left RC AVF  Wound improving, nearly healed  Advised to keep clean and dry  Cover with dry dressing for next few days until completely healed.   no evidence of steal syndrome  av access is not yet mature  There are 3 large branches previously noted on US that need addressed to help maturation  The procedure including risks, benefits, and alternative options were discussed  The patient understands and wishes to proceed    Pt seen and plan reviewed with Dr. Buckley.     Clarissa Madrigal, ANDRAE - CNP

## 2025-05-05 NOTE — TELEPHONE ENCOUNTER
Spoke with patient, scheduled revision L AVF (branch ligation) on Friday, 5-30-25.  Report to [facility] at 5:30 am. NPO after midnight except for heart and/or BP meds with sips of water. Will need transportation.

## 2025-05-23 NOTE — PROGRESS NOTES
[facility]   PRE-ADMISSION TESTING GENERAL INSTRUCTIONS  PAT Phone Number: [phone]      GENERAL INSTRUCTIONS:    [x] Antibacterial Soap Shower Night before AND the Morning of procedure.  [] The Night Before Surgery: Take an antibacterial soap shower - followed by CHG Wipes.   -The Morning of Surgery: Repeat CHG Wipes.  [x] Do not wear makeup, lotions, powders, deodorant the morning of surgery.  [x] No solid food after midnight. You may have SIPS of clear liquids up until 2 hours before your arrival time to the hospital.   [x] You may brush your teeth, gargle, but do not swallow water.   [x] No tobacco products, illegal drugs, or alcohol within 24 hours of your surgery.  [x] Jewelry or valuables should not be brought to the hospital. All body and/or tongue piercing's must be removed prior to arriving to hospital. No contact lens or hair pins.   [x] Arrange transportation with a responsible adult  to and from the hospital. Arrange for someone to be with you for the remainder of the day and for 24 hours after your procedure due to having had anesthesia.          -Who will be your  for transportation? Sister in law        -Who will be staying with you for 24 hrs after your procedure? Sister in law  [x] Bring insurance card and photo ID.  [x] Bring copy of living will or healthcare power of  papers to be placed in your electronic record.  [] Urine Pregnancy test will be preformed the day of surgery. A specimen sample may be brought from home.  [] Transfusion (Green) Bracelet: Please bring with you to hospital, day of surgery.     PARKING INSTRUCTIONS:     [x] ARRIVAL DATE & TIME: 5/30 @ 0800  [x] Times are subject to change. We will contact you the business day before surgery if that were to occur.  [x] Enter into the [facility] Entrance. Two people may accompany you. Masks are not required.  [x] Parking Lot \"I\" is where you will park. It is located on the corner

## 2025-05-30 ENCOUNTER — ANESTHESIA EVENT (OUTPATIENT)
Dept: OPERATING ROOM | Age: 75
End: 2025-05-30
Payer: MEDICARE

## 2025-05-30 ENCOUNTER — TELEPHONE (OUTPATIENT)
Dept: VASCULAR SURGERY | Age: 75
End: 2025-05-30

## 2025-05-30 ENCOUNTER — HOSPITAL ENCOUNTER (OUTPATIENT)
Age: 75
Setting detail: OUTPATIENT SURGERY
Discharge: HOME | End: 2025-05-30
Attending: SURGERY | Admitting: SURGERY
Payer: MEDICARE

## 2025-05-30 ENCOUNTER — ANESTHESIA (OUTPATIENT)
Dept: OPERATING ROOM | Age: 75
End: 2025-05-30
Payer: MEDICARE

## 2025-05-30 VITALS
HEART RATE: 69 BPM | BODY MASS INDEX: 27.48 KG/M2 | TEMPERATURE: 97.2 F | WEIGHT: 171 LBS | RESPIRATION RATE: 16 BRPM | SYSTOLIC BLOOD PRESSURE: 166 MMHG | OXYGEN SATURATION: 97 % | DIASTOLIC BLOOD PRESSURE: 87 MMHG | HEIGHT: 66 IN

## 2025-05-30 DIAGNOSIS — N18.6 ENCOUNTER REGARDING VASCULAR ACCESS FOR DIALYSIS FOR ESRD (HCC): Primary | ICD-10-CM

## 2025-05-30 DIAGNOSIS — Z01.812 PRE-OPERATIVE LABORATORY EXAMINATION: ICD-10-CM

## 2025-05-30 DIAGNOSIS — Z99.2 ENCOUNTER REGARDING VASCULAR ACCESS FOR DIALYSIS FOR ESRD (HCC): Primary | ICD-10-CM

## 2025-05-30 LAB
ABO + RH BLD: NORMAL
ANION GAP SERPL CALCULATED.3IONS-SCNC: 14 MMOL/L (ref 7–16)
ARM BAND NUMBER: NORMAL
BLOOD BANK SAMPLE EXPIRATION: NORMAL
BLOOD GROUP ANTIBODIES SERPL: NEGATIVE
BUN SERPL-MCNC: 26 MG/DL (ref 8–23)
CALCIUM SERPL-MCNC: 9.6 MG/DL (ref 8.8–10.2)
CHLORIDE SERPL-SCNC: 99 MMOL/L (ref 98–107)
CO2 SERPL-SCNC: 25 MMOL/L (ref 22–29)
CREAT SERPL-MCNC: 4.4 MG/DL (ref 0.7–1.2)
ERYTHROCYTE [DISTWIDTH] IN BLOOD BY AUTOMATED COUNT: 13.8 % (ref 11.5–15)
GFR, ESTIMATED: 14 ML/MIN/1.73M2
GLUCOSE BLD-MCNC: 130 MG/DL (ref 74–99)
GLUCOSE BLD-MCNC: 168 MG/DL (ref 74–99)
GLUCOSE SERPL-MCNC: 187 MG/DL (ref 74–99)
HCT VFR BLD AUTO: 33.4 % (ref 37–54)
HGB BLD-MCNC: 11.3 G/DL (ref 12.5–16.5)
INR PPP: 0.9
MCH RBC QN AUTO: 32.6 PG (ref 26–35)
MCHC RBC AUTO-ENTMCNC: 33.8 G/DL (ref 32–34.5)
MCV RBC AUTO: 96.3 FL (ref 80–99.9)
PLATELET # BLD AUTO: 200 K/UL (ref 130–450)
PMV BLD AUTO: 9.9 FL (ref 7–12)
POTASSIUM SERPL-SCNC: 4.5 MMOL/L (ref 3.5–5.1)
PROTHROMBIN TIME: 10.3 SEC (ref 9.3–12.4)
RBC # BLD AUTO: 3.47 M/UL (ref 3.8–5.8)
SODIUM SERPL-SCNC: 138 MMOL/L (ref 136–145)
WBC OTHER # BLD: 8 K/UL (ref 4.5–11.5)

## 2025-05-30 PROCEDURE — 6360000002 HC RX W HCPCS: Performed by: SURGERY

## 2025-05-30 PROCEDURE — 85027 COMPLETE CBC AUTOMATED: CPT

## 2025-05-30 PROCEDURE — 7100000011 HC PHASE II RECOVERY - ADDTL 15 MIN: Performed by: SURGERY

## 2025-05-30 PROCEDURE — 2709999900 HC NON-CHARGEABLE SUPPLY: Performed by: SURGERY

## 2025-05-30 PROCEDURE — 6360000002 HC RX W HCPCS: Performed by: STUDENT IN AN ORGANIZED HEALTH CARE EDUCATION/TRAINING PROGRAM

## 2025-05-30 PROCEDURE — 6360000002 HC RX W HCPCS

## 2025-05-30 PROCEDURE — 36832 AV FISTULA REVISION OPEN: CPT | Performed by: SURGERY

## 2025-05-30 PROCEDURE — 3600000012 HC SURGERY LEVEL 2 ADDTL 15MIN: Performed by: SURGERY

## 2025-05-30 PROCEDURE — 2500000003 HC RX 250 WO HCPCS

## 2025-05-30 PROCEDURE — 82962 GLUCOSE BLOOD TEST: CPT

## 2025-05-30 PROCEDURE — 7100000010 HC PHASE II RECOVERY - FIRST 15 MIN: Performed by: SURGERY

## 2025-05-30 PROCEDURE — 80048 BASIC METABOLIC PNL TOTAL CA: CPT

## 2025-05-30 PROCEDURE — 85610 PROTHROMBIN TIME: CPT

## 2025-05-30 PROCEDURE — 86901 BLOOD TYPING SEROLOGIC RH(D): CPT

## 2025-05-30 PROCEDURE — 64415 NJX AA&/STRD BRCH PLXS IMG: CPT | Performed by: STUDENT IN AN ORGANIZED HEALTH CARE EDUCATION/TRAINING PROGRAM

## 2025-05-30 PROCEDURE — 3600000002 HC SURGERY LEVEL 2 BASE: Performed by: SURGERY

## 2025-05-30 PROCEDURE — 3700000001 HC ADD 15 MINUTES (ANESTHESIA): Performed by: SURGERY

## 2025-05-30 PROCEDURE — 2500000003 HC RX 250 WO HCPCS: Performed by: SURGERY

## 2025-05-30 PROCEDURE — 2580000003 HC RX 258

## 2025-05-30 PROCEDURE — 3700000000 HC ANESTHESIA ATTENDED CARE: Performed by: SURGERY

## 2025-05-30 PROCEDURE — 86900 BLOOD TYPING SEROLOGIC ABO: CPT

## 2025-05-30 PROCEDURE — 86850 RBC ANTIBODY SCREEN: CPT

## 2025-05-30 RX ORDER — ROPIVACAINE HYDROCHLORIDE 5 MG/ML
30 INJECTION, SOLUTION EPIDURAL; INFILTRATION; PERINEURAL
Status: DISCONTINUED | OUTPATIENT
Start: 2025-05-30 | End: 2025-05-30 | Stop reason: HOSPADM

## 2025-05-30 RX ORDER — DIPHENHYDRAMINE HYDROCHLORIDE 50 MG/ML
12.5 INJECTION, SOLUTION INTRAMUSCULAR; INTRAVENOUS
Status: DISCONTINUED | OUTPATIENT
Start: 2025-05-30 | End: 2025-05-30 | Stop reason: HOSPADM

## 2025-05-30 RX ORDER — HYDROMORPHONE HYDROCHLORIDE 1 MG/ML
0.25 INJECTION, SOLUTION INTRAMUSCULAR; INTRAVENOUS; SUBCUTANEOUS EVERY 5 MIN PRN
Status: DISCONTINUED | OUTPATIENT
Start: 2025-05-30 | End: 2025-05-30 | Stop reason: HOSPADM

## 2025-05-30 RX ORDER — HYDROMORPHONE HYDROCHLORIDE 1 MG/ML
0.5 INJECTION, SOLUTION INTRAMUSCULAR; INTRAVENOUS; SUBCUTANEOUS EVERY 5 MIN PRN
Status: DISCONTINUED | OUTPATIENT
Start: 2025-05-30 | End: 2025-05-30 | Stop reason: HOSPADM

## 2025-05-30 RX ORDER — PROPOFOL 10 MG/ML
INJECTION, EMULSION INTRAVENOUS
Status: DISCONTINUED | OUTPATIENT
Start: 2025-05-30 | End: 2025-05-30 | Stop reason: SDUPTHER

## 2025-05-30 RX ORDER — SODIUM CHLORIDE 0.9 % (FLUSH) 0.9 %
5-40 SYRINGE (ML) INJECTION PRN
Status: DISCONTINUED | OUTPATIENT
Start: 2025-05-30 | End: 2025-05-30 | Stop reason: HOSPADM

## 2025-05-30 RX ORDER — PROCHLORPERAZINE EDISYLATE 5 MG/ML
5 INJECTION INTRAMUSCULAR; INTRAVENOUS
Status: DISCONTINUED | OUTPATIENT
Start: 2025-05-30 | End: 2025-05-30 | Stop reason: HOSPADM

## 2025-05-30 RX ORDER — SODIUM CHLORIDE 9 MG/ML
INJECTION, SOLUTION INTRAVENOUS PRN
Status: DISCONTINUED | OUTPATIENT
Start: 2025-05-30 | End: 2025-05-30 | Stop reason: HOSPADM

## 2025-05-30 RX ORDER — FENTANYL CITRATE 50 UG/ML
INJECTION, SOLUTION INTRAMUSCULAR; INTRAVENOUS
Status: DISCONTINUED | OUTPATIENT
Start: 2025-05-30 | End: 2025-05-30 | Stop reason: SDUPTHER

## 2025-05-30 RX ORDER — SODIUM CHLORIDE 9 MG/ML
INJECTION, SOLUTION INTRAVENOUS CONTINUOUS
Status: DISCONTINUED | OUTPATIENT
Start: 2025-05-30 | End: 2025-05-30 | Stop reason: HOSPADM

## 2025-05-30 RX ORDER — OXYCODONE AND ACETAMINOPHEN 5; 325 MG/1; MG/1
1 TABLET ORAL EVERY 6 HOURS PRN
Qty: 20 TABLET | Refills: 0 | Status: SHIPPED | OUTPATIENT
Start: 2025-05-30 | End: 2025-06-04

## 2025-05-30 RX ORDER — ONDANSETRON 2 MG/ML
4 INJECTION INTRAMUSCULAR; INTRAVENOUS
Status: DISCONTINUED | OUTPATIENT
Start: 2025-05-30 | End: 2025-05-30 | Stop reason: HOSPADM

## 2025-05-30 RX ORDER — SODIUM CHLORIDE 0.9 % (FLUSH) 0.9 %
5-40 SYRINGE (ML) INJECTION EVERY 12 HOURS SCHEDULED
Status: DISCONTINUED | OUTPATIENT
Start: 2025-05-30 | End: 2025-05-30 | Stop reason: HOSPADM

## 2025-05-30 RX ORDER — MIDAZOLAM HYDROCHLORIDE 2 MG/2ML
1 INJECTION, SOLUTION INTRAMUSCULAR; INTRAVENOUS EVERY 5 MIN PRN
Status: DISCONTINUED | OUTPATIENT
Start: 2025-05-30 | End: 2025-05-30 | Stop reason: HOSPADM

## 2025-05-30 RX ORDER — NALOXONE HYDROCHLORIDE 0.4 MG/ML
INJECTION, SOLUTION INTRAMUSCULAR; INTRAVENOUS; SUBCUTANEOUS PRN
Status: DISCONTINUED | OUTPATIENT
Start: 2025-05-30 | End: 2025-05-30 | Stop reason: HOSPADM

## 2025-05-30 RX ORDER — IPRATROPIUM BROMIDE AND ALBUTEROL SULFATE 2.5; .5 MG/3ML; MG/3ML
1 SOLUTION RESPIRATORY (INHALATION)
Status: DISCONTINUED | OUTPATIENT
Start: 2025-05-30 | End: 2025-05-30 | Stop reason: HOSPADM

## 2025-05-30 RX ORDER — ROPIVACAINE HYDROCHLORIDE 5 MG/ML
INJECTION, SOLUTION EPIDURAL; INFILTRATION; PERINEURAL
Status: COMPLETED | OUTPATIENT
Start: 2025-05-30 | End: 2025-05-30

## 2025-05-30 RX ORDER — LABETALOL HYDROCHLORIDE 5 MG/ML
5 INJECTION, SOLUTION INTRAVENOUS
Status: DISCONTINUED | OUTPATIENT
Start: 2025-05-30 | End: 2025-05-30 | Stop reason: HOSPADM

## 2025-05-30 RX ADMIN — SODIUM CHLORIDE: 9 INJECTION, SOLUTION INTRAVENOUS at 09:51

## 2025-05-30 RX ADMIN — ROPIVACAINE HYDROCHLORIDE 30 ML: 5 INJECTION EPIDURAL; INFILTRATION; PERINEURAL at 09:42

## 2025-05-30 RX ADMIN — FENTANYL CITRATE 25 MCG: 50 INJECTION, SOLUTION INTRAMUSCULAR; INTRAVENOUS at 10:16

## 2025-05-30 RX ADMIN — FENTANYL CITRATE 25 MCG: 50 INJECTION, SOLUTION INTRAMUSCULAR; INTRAVENOUS at 10:11

## 2025-05-30 RX ADMIN — FENTANYL CITRATE 25 MCG: 50 INJECTION, SOLUTION INTRAMUSCULAR; INTRAVENOUS at 10:35

## 2025-05-30 RX ADMIN — FENTANYL CITRATE 25 MCG: 50 INJECTION, SOLUTION INTRAMUSCULAR; INTRAVENOUS at 10:04

## 2025-05-30 RX ADMIN — PROPOFOL 50 MCG/KG/MIN: 10 INJECTION, EMULSION INTRAVENOUS at 09:58

## 2025-05-30 RX ADMIN — CEFAZOLIN 2000 MG: 1 INJECTION, POWDER, FOR SOLUTION INTRAMUSCULAR; INTRAVENOUS at 10:02

## 2025-05-30 RX ADMIN — MIDAZOLAM HYDROCHLORIDE 2 MG: 1 INJECTION, SOLUTION INTRAMUSCULAR; INTRAVENOUS at 09:40

## 2025-05-30 ASSESSMENT — PAIN - FUNCTIONAL ASSESSMENT
PAIN_FUNCTIONAL_ASSESSMENT: 0-10
PAIN_FUNCTIONAL_ASSESSMENT: NONE - DENIES PAIN

## 2025-05-30 ASSESSMENT — PAIN SCALES - GENERAL
PAINLEVEL_OUTOF10: 0
PAINLEVEL_OUTOF10: 0

## 2025-05-30 ASSESSMENT — LIFESTYLE VARIABLES: SMOKING_STATUS: 1

## 2025-05-30 NOTE — ANESTHESIA PROCEDURE NOTES
Peripheral Block    Patient location during procedure: procedure area  Reason for block: post-op pain management  Start time: 5/30/2025 9:42 AM  End time: 5/30/2025 9:44 AM  Staffing  Performed: anesthesiologist   Anesthesiologist: Roger Garibay DO  Performed by: Roger Garibay DO  Authorized by: Roger Garibay DO    Preanesthetic Checklist  Completed: patient identified, IV checked, site marked, risks and benefits discussed, surgical/procedural consents, equipment checked, pre-op evaluation, timeout performed, anesthesia consent given, oxygen available, monitors applied/VS acknowledged, fire risk safety assessment completed and verbalized and blood product R/B/A discussed and consented  Peripheral Block   Patient position: supine  Prep: ChloraPrep  Provider prep: sterile gloves and mask  Patient monitoring: continuous pulse ox, frequent blood pressure checks, IV access, oxygen and responsive to questions  Block type: Brachial plexus  Infraclavicular  Laterality: left  Injection technique: single-shot  Guidance: ultrasound guided  Anesthesia block local: with 1:[id number] Epinephrine.  Anesthesia block local: with 1:[id number] Epinephrine.    Needle   Needle type: insulated echogenic nerve stimulator needle   Needle gauge: 20 G  Needle localization: ultrasound guidance  Needle length: 10 cm  Assessment   Injection assessment: negative aspiration for heme, no paresthesia on injection, local visualized surrounding nerve on ultrasound, no intravascular symptoms and low pressure verified by pressure monitor  Paresthesia pain: none  Slow fractionated injection: yes  Hemodynamics: stable  Outcomes: uncomplicated and patient tolerated procedure well    Medications Administered  ropivacaine (NAROPIN) injection 0.5% - Perineural   30 mL - 5/30/2025 9:42:00 AM

## 2025-05-30 NOTE — ANESTHESIA POSTPROCEDURE EVALUATION
Department of Anesthesiology  Postprocedure Note    Patient: Santosh Thomas  MRN: [id number]  YOB: 1950  Date of evaluation: 5/30/2025    Procedure Summary       Date: 05/30/25 Room / Location: [address]    Anesthesia Start: 0951 Anesthesia Stop: 1108    Procedure: left upper extremity arteriovenous fistula revision (Left: Arm Lower) Diagnosis:       End stage renal disease (HCC)      (End stage renal disease (HCC) [N18.6])    Surgeons: Margaret Buckley MD Responsible Provider: Roger Garibay DO    Anesthesia Type: MAC, regional ASA Status: 4            Anesthesia Type: No value filed.    Aura Phase I: Aura Score: 10    Aura Phase II: Aura Score: 10    Anesthesia Post Evaluation    Patient location during evaluation: PACU  Patient participation: complete - patient participated  Level of consciousness: awake  Cardiovascular status: blood pressure returned to baseline  Respiratory status: acceptable  Hydration status: euvolemic  Multimodal analgesia pain management approach  Pain management: adequate        No notable events documented.

## 2025-05-30 NOTE — OP NOTE
Operative Note      Patient: Santosh Thomas  YOB: 1950  MRN: [id number]    Date of Procedure: 5/30/2025    Pre-Op Diagnosis Codes:      * End stage renal disease (HCC) [N18.6]    Post-Op Diagnosis: Same       Procedure(s):  left upper extremity arteriovenous fistula revision - branch ligation x 5    Surgeon(s):  Margaret Buckley MD    Assistant:   Surgical Assistant: Alex Bright  Resident: Nelson Blanc DO    Anesthesia: Monitor Anesthesia Care    Estimated Blood Loss (mL): less than 50     Complications: None    Specimens:   * No specimens in log *    Implants:  * No implants in log *      Drains: * No LDAs found *    Findings:  Infection Present At Time Of Surgery (PATOS) (choose all levels that have infection present):  No infection present  Other Findings: Much improved thrill up the arm after ligation of branches    DESCRIPTION OF PROCEDURE: The patient was identified and the procedure was confirmed. The left arm was prepped and draped in the usual sterile fashion. Block was tested and noted to be adequate. Lidocaine 1% mixed with 0.25% Marcaine was used for local anesthesia supplement as needed.     US was used to evaluate avf and branches were able to be identified which were diverting flow from the main cephalic vein fistula.  Incision was made overlying each branch and sharp dissection was used to identify and delineate the branch.  The branch was ligated with silk suture.  This was done a total of 5 times for each branch.      Hemostasis was obtained and the incisions were irrigated with saline solution. The incision was approximated with Vicryl sutures and Dermabond was applied to the incisions in the operating room.     The fistula was noted to be larger than preop and had improved thrill.      Needle, sponge and instrument counts were reported as correct x2. The patient tolerated the procedure and was transferred to the recovery area in satisfactory condition.     Will

## 2025-05-30 NOTE — ANESTHESIA PRE PROCEDURE
Department of Anesthesiology  Preprocedure Note       Name:  Santosh Thomas   Age:  74 y.o.  :  1950                                          MRN:  [id number]         Date:  2025      Surgeon: Margaret Buckley MD     Procedure: Procedure(s):  ARTERIOVENOUS FISTULA REVISION (branch ligation)    Medications prior to admission:   Prior to Admission medications    Medication Sig Start Date End Date Taking? Authorizing Provider   CALCIUM ACETATE, PHOS BINDER, PO Take 667 mg by mouth 2 tabs  Patient not taking: Reported on 2025    Ronda Stone MD   allopurinol (ZYLOPRIM) 100 MG tablet Take 1 tablet by mouth daily 10/14/24   Devaughn Reed DO   calcitRIOL (ROCALTROL) 0.25 MCG capsule Take 1 capsule by mouth three times a week 10/14/24   Devaughn Reed DO   nitroglycerin (NITRO-BID) 2 % ointment Place 1 inch onto the skin every 8 hours  Patient not taking: Reported on 2025 10/13/24   Sally Strong MD   sacubitril-valsartan (ENTRESTO) 24-26 MG per tablet Take 1 tablet by mouth 2 times daily  Patient not taking: Reported on 2025 10/13/24   Sally Strong MD   bumetanide (BUMEX) 2 MG tablet Take 1 tablet by mouth 2 times daily (before meals) 10/14/24   Sally Strong MD   Cholecalciferol 50 MCG (2000 UT) TABS Take by mouth 24   Ronda Stone MD   diazePAM (VALIUM) 5 MG tablet Take 1 tablet by mouth 2 times daily.    Ronda Stone MD   LANTUS SOLOSTAR 100 UNIT/ML injection pen 30 Units nightly 24   Ronda Stone MD   insulin lispro, 1 Unit Dial, (HUMALOG/ADMELOG) 100 UNIT/ML SOPN 3 Units 3 times daily (with meals) Plus sliding scale 24   Ronda Stone MD   aspirin-acetaminophen-caffeine (EXCEDRIN MIGRAINE) 250-250-65 MG per tablet Take 1 tablet by mouth every 6 hours as needed for Headaches  Patient not taking: Reported on 2025    Ronda Stone MD   azelastine (ASTELIN) 0.1 % nasal spray 1 spray by Nasal

## 2025-05-30 NOTE — TELEPHONE ENCOUNTER
Notified Pattie at [facility] in [facility], ok to use AVF in 2 weeks, let us know when HD catheter can be removed.

## 2025-05-30 NOTE — TELEPHONE ENCOUNTER
----- Message from ANDRAE Strickland CNP sent at 5/30/2025 11:06 AM EDT -----  Please have pt follow up with PK in 4 weeks.  Can we let his dialysis center know they can start using his fistula in 2 weeks?  Call with any issues related to the fistula or if TDC ready to be removed. Thanks!

## 2025-05-30 NOTE — PROGRESS NOTES
Patient was seen and examined.  He presents today for elective left upper extremity RC AV fistula revision.  It was noted on outpatient bedside ultrasound that 3 veins were diverting flow.  In order to assist in maturation, patient is here today for branch ligation.  He denies any recent changes to his overall health and is doing well.  He denies any use of anticoagulation.  He has a palpable thrill in his fistula.  Patient understands the risks, benefits, alternatives and expected outcomes of the procedure and wishes to proceed with surgery.    Dr. Nelson Blanc PGY3

## 2025-05-30 NOTE — H&P
Vascular Surgery History & Physical Exam      Chief Complaint:  ESRD    HISTORY OF PRESENT ILLNESS:                The patient is a 74 y.o. male who presents to the hospital for elective revision of a arteriovenous fistula.  The patient denies any problems since the last office visit.    IMPRESSION:   ESRD    PLAN: Revision of a left upper extremity arteriovenous fistula, branch ligation    I reviewed the procedure with the patient and family as available.  I discussed the procedure, risks, benefits, complications, and alternatives of the procedure.  They understand and consent.  All questions were answered    ROS : All others Negative if blank [], Positive if [x]  General   [] Fevers   [] Chills   [] Weight Loss   Skin   [] Tissue Loss   Eyes   [] Wears Glasses/Contacts   [] Vision Changes   Respiratory    [] Shortness of breath   Cardiovascular   [] Chest Pain   [] Shortness of breath with exertion   Gastrointestinal   [] Abdominal Pain     Past Medical History:   Diagnosis Date    Dementia (HCC)     Depression     Diabetes mellitus (HCC)     Hypertension     Neuropathy      Past Surgical History:   Procedure Laterality Date    BACK SURGERY      DIALYSIS FISTULA CREATION Left 3/21/2025    LEFT UPPER EXTREMITY ARTERIOVENOUS FISTULA performed [place] [facility] [place]    FOOT DEBRIDEMENT Left 3/25/2021    BONE BIOPSY AND CULTURE, GREAT TOE LEFT FOOT performed by Velasquez Wilcox DPM at [facility] OR    IR TUNNELED CVC PLACE WO SQ PORT/PUMP > 5 YEARS  10/10/2024    FL TUNNELED CVC PLACE WO SQ PORT/PUMP > 5 YEARS 10/10/2024 [facility] RADIOLOGY    TOE AMPUTATION Left 3/28/2021    LEFT FOOT TOE AMPUTATION,  LEFT FOOT PARTIAL HALLUX AMPUTATION WITH DEBRIDEMENT OF NON VIABLE TISSUE performed by Velasquez Wilcox DPM at [facility] OR     Current Medications:     Current Facility-Administered Medications:     0.9 % sodium chloride infusion, , IntraVENous, Continuous, Roger Hughes, APRN - CNP    sodium chloride flush 0.9 %

## 2025-05-30 NOTE — DISCHARGE INSTRUCTIONS
Ok to use the fistula starting on 6/13/25  Call with issues related to the fistula or if ready for tunneled catheter to be removed.     Elevate Left upper extremity at all times  Keep left arm sling on until tomorrow.  You may remove tomorrow and discard in the trash.     [facility] AMBULATORY PROCEDURE DISCHARGE INSTRUCTIONS  You may be drowsy or lightheaded after receiving sedation or anesthesia.    A responsible person should be with you for the next 24 hours.    Please follow the instructions checked below:    DIET INSTRUCTIONS:  [x]Start with light diet and progress to your normal diet as you feel like eating. If you experience nausea or repeated episodes of vomiting which persist beyond 12-24 hours, notify your doctor.  []Other     ACTIVITY INSTRUCTIONS:  [x]Rest today. Increase activity as tolerated    [x]Elevate operative limb   [x]No heavy lifting or strenuous activity     [x]No driving for 2 days  []Other     WOUND/DRESSING INSTRUCTIONS:  Always ensure you and your care giver clean hands before and after caring for the wound.  []May shower      [x]May bathe      [x]Derma bond dressing-Do not apply lotion, gel, or liquid to wound while the derma bond is in place.   []Other         MEDICATION INSTRUCTIONS:  [x]Prescriptions sent with you.  Use as directed.  When taking pain medications, you may experience dizziness or drowsiness.  Do not drink alcohol or drive when taking these medications.  [x]You may take a non-prescription “headache remedy”, preferably one that does not contain aspirin.    FOLLOW-UP CARE:  [x]Call the office at [phone] for follow-up appointment in 4 weeks    Call physician if they or any other problems occur:  Fever over 101°    Redness, swelling, hardness or warmth at the operative site  Swelling of arm  Unrelieved nausea    Foul smelling or cloudy drainage at the operative site   Unrelieved pain    Blood soaked dressing. (Some oozing may be normal)  Pain in

## 2025-06-18 ENCOUNTER — APPOINTMENT (OUTPATIENT)
Dept: AUDIOLOGY | Facility: CLINIC | Age: 75
End: 2025-06-18
Payer: COMMERCIAL

## 2025-06-18 ENCOUNTER — APPOINTMENT (OUTPATIENT)
Dept: OTOLARYNGOLOGY | Facility: CLINIC | Age: 75
End: 2025-06-18
Payer: COMMERCIAL

## 2025-06-26 ENCOUNTER — TELEPHONE (OUTPATIENT)
Dept: VASCULAR SURGERY | Age: 75
End: 2025-06-26

## 2025-06-26 NOTE — TELEPHONE ENCOUNTER
Patient called in stating he wanted to cancel his post op appointment for his fistula. He said \"since they fixed the fistula twice, I'm not doing it anymore. So please stop calling.\" And claimed he was going to have dialysis handle it.

## 2025-07-16 ENCOUNTER — OFFICE VISIT (OUTPATIENT)
Dept: FAMILY MEDICINE CLINIC | Age: 75
End: 2025-07-16
Payer: MEDICARE

## 2025-07-16 ENCOUNTER — HOSPITAL ENCOUNTER (OUTPATIENT)
Age: 75
Discharge: HOME | End: 2025-07-16
Payer: MEDICARE

## 2025-07-16 VITALS
SYSTOLIC BLOOD PRESSURE: 109 MMHG | WEIGHT: 172.6 LBS | OXYGEN SATURATION: 95 % | HEIGHT: 66 IN | DIASTOLIC BLOOD PRESSURE: 62 MMHG | RESPIRATION RATE: 18 BRPM | TEMPERATURE: 97.1 F | BODY MASS INDEX: 27.74 KG/M2 | HEART RATE: 84 BPM

## 2025-07-16 DIAGNOSIS — Z79.4 TYPE 2 DIABETES MELLITUS WITH CHRONIC KIDNEY DISEASE ON CHRONIC DIALYSIS, WITH LONG-TERM CURRENT USE OF INSULIN (HCC): ICD-10-CM

## 2025-07-16 DIAGNOSIS — Z12.5 SCREENING FOR PROSTATE CANCER: ICD-10-CM

## 2025-07-16 DIAGNOSIS — Z99.2 TYPE 2 DIABETES MELLITUS WITH CHRONIC KIDNEY DISEASE ON CHRONIC DIALYSIS, WITH LONG-TERM CURRENT USE OF INSULIN (HCC): ICD-10-CM

## 2025-07-16 DIAGNOSIS — E11.22 TYPE 2 DIABETES MELLITUS WITH CHRONIC KIDNEY DISEASE ON CHRONIC DIALYSIS, WITH LONG-TERM CURRENT USE OF INSULIN (HCC): ICD-10-CM

## 2025-07-16 DIAGNOSIS — Z76.89 ESTABLISHING CARE WITH NEW DOCTOR, ENCOUNTER FOR: Primary | ICD-10-CM

## 2025-07-16 DIAGNOSIS — Z13.6 ENCOUNTER FOR LIPID SCREENING FOR CARDIOVASCULAR DISEASE: ICD-10-CM

## 2025-07-16 DIAGNOSIS — H69.91 EUSTACHIAN TUBE DYSFUNCTION, RIGHT: ICD-10-CM

## 2025-07-16 DIAGNOSIS — Z99.2 ESRD (END STAGE RENAL DISEASE) ON DIALYSIS (HCC): ICD-10-CM

## 2025-07-16 DIAGNOSIS — Z13.21 ENCOUNTER FOR VITAMIN DEFICIENCY SCREENING: ICD-10-CM

## 2025-07-16 DIAGNOSIS — N18.6 TYPE 2 DIABETES MELLITUS WITH CHRONIC KIDNEY DISEASE ON CHRONIC DIALYSIS, WITH LONG-TERM CURRENT USE OF INSULIN (HCC): ICD-10-CM

## 2025-07-16 DIAGNOSIS — Z12.11 SCREENING FOR MALIGNANT NEOPLASM OF COLON: ICD-10-CM

## 2025-07-16 DIAGNOSIS — E55.9 VITAMIN D DEFICIENCY: ICD-10-CM

## 2025-07-16 DIAGNOSIS — I50.9 NEW ONSET OF CONGESTIVE HEART FAILURE (HCC): ICD-10-CM

## 2025-07-16 DIAGNOSIS — Z91.81 AT HIGH RISK FOR FALLS: ICD-10-CM

## 2025-07-16 DIAGNOSIS — F41.1 GAD (GENERALIZED ANXIETY DISORDER): ICD-10-CM

## 2025-07-16 DIAGNOSIS — Z13.220 ENCOUNTER FOR LIPID SCREENING FOR CARDIOVASCULAR DISEASE: ICD-10-CM

## 2025-07-16 DIAGNOSIS — Z89.412: ICD-10-CM

## 2025-07-16 DIAGNOSIS — N18.6 ESRD (END STAGE RENAL DISEASE) ON DIALYSIS (HCC): ICD-10-CM

## 2025-07-16 DIAGNOSIS — R09.82 POST-NASAL DRAINAGE: ICD-10-CM

## 2025-07-16 PROBLEM — E11.10 DIABETIC ACIDOSIS WITHOUT COMA (HCC): Status: RESOLVED | Noted: 2023-01-27 | Resolved: 2025-07-16

## 2025-07-16 PROBLEM — E10.10 DKA, TYPE 1, NOT AT GOAL (HCC): Status: RESOLVED | Noted: 2023-01-26 | Resolved: 2025-07-16

## 2025-07-16 LAB
25(OH)D3 SERPL-MCNC: 62.9 NG/ML (ref 30–100)
ALBUMIN SERPL-MCNC: 4.4 G/DL (ref 3.5–5.2)
ALP SERPL-CCNC: 43 U/L (ref 40–129)
ALT SERPL-CCNC: 19 U/L (ref 0–50)
ANION GAP SERPL CALCULATED.3IONS-SCNC: 17 MMOL/L (ref 7–16)
AST SERPL-CCNC: 40 U/L (ref 0–50)
BASOPHILS # BLD: 0.06 K/UL (ref 0–0.2)
BASOPHILS NFR BLD: 1 % (ref 0–2)
BILIRUB SERPL-MCNC: 0.5 MG/DL (ref 0–1.2)
BUN SERPL-MCNC: 42 MG/DL (ref 8–23)
CALCIUM SERPL-MCNC: 10.2 MG/DL (ref 8.8–10.2)
CHLORIDE SERPL-SCNC: 95 MMOL/L (ref 98–107)
CHOLEST SERPL-MCNC: 201 MG/DL
CO2 SERPL-SCNC: 25 MMOL/L (ref 22–29)
CREAT SERPL-MCNC: 6.5 MG/DL (ref 0.7–1.2)
EOSINOPHIL # BLD: 0.28 K/UL (ref 0.05–0.5)
EOSINOPHILS RELATIVE PERCENT: 4 % (ref 0–6)
ERYTHROCYTE [DISTWIDTH] IN BLOOD BY AUTOMATED COUNT: 14.4 % (ref 11.5–15)
GFR, ESTIMATED: 8 ML/MIN/1.73M2
GLUCOSE SERPL-MCNC: 123 MG/DL (ref 74–99)
HBA1C MFR BLD: 7.3 %
HCT VFR BLD AUTO: 32.9 % (ref 37–54)
HDLC SERPL-MCNC: 40 MG/DL
HGB BLD-MCNC: 11 G/DL (ref 12.5–16.5)
IMM GRANULOCYTES # BLD AUTO: 0.07 K/UL (ref 0–0.58)
IMM GRANULOCYTES NFR BLD: 1 % (ref 0–5)
LDLC SERPL CALC-MCNC: 111 MG/DL
LYMPHOCYTES NFR BLD: 1.04 K/UL (ref 1.5–4)
LYMPHOCYTES RELATIVE PERCENT: 15 % (ref 20–42)
MCH RBC QN AUTO: 32.2 PG (ref 26–35)
MCHC RBC AUTO-ENTMCNC: 33.4 G/DL (ref 32–34.5)
MCV RBC AUTO: 96.2 FL (ref 80–99.9)
MONOCYTES NFR BLD: 0.6 K/UL (ref 0.1–0.95)
MONOCYTES NFR BLD: 9 % (ref 2–12)
NEUTROPHILS NFR BLD: 70 % (ref 43–80)
NEUTS SEG NFR BLD: 4.84 K/UL (ref 1.8–7.3)
PLATELET # BLD AUTO: 185 K/UL (ref 130–450)
PMV BLD AUTO: 9.2 FL (ref 7–12)
POTASSIUM SERPL-SCNC: 5.2 MMOL/L (ref 3.5–5.1)
PROT SERPL-MCNC: 7.6 G/DL (ref 6.4–8.3)
PSA SERPL-MCNC: 1.36 NG/ML (ref 0–4)
RBC # BLD AUTO: 3.42 M/UL (ref 3.8–5.8)
SODIUM SERPL-SCNC: 137 MMOL/L (ref 136–145)
TRIGL SERPL-MCNC: 254 MG/DL
VLDLC SERPL CALC-MCNC: 51 MG/DL
WBC OTHER # BLD: 6.9 K/UL (ref 4.5–11.5)

## 2025-07-16 PROCEDURE — 85025 COMPLETE CBC W/AUTO DIFF WBC: CPT

## 2025-07-16 PROCEDURE — G2211 COMPLEX E/M VISIT ADD ON: HCPCS | Performed by: NEUROMUSCULOSKELETAL MEDICINE & OMM

## 2025-07-16 PROCEDURE — 80061 LIPID PANEL: CPT

## 2025-07-16 PROCEDURE — 3017F COLORECTAL CA SCREEN DOC REV: CPT | Performed by: NEUROMUSCULOSKELETAL MEDICINE & OMM

## 2025-07-16 PROCEDURE — 1123F ACP DISCUSS/DSCN MKR DOCD: CPT | Performed by: NEUROMUSCULOSKELETAL MEDICINE & OMM

## 2025-07-16 PROCEDURE — 1036F TOBACCO NON-USER: CPT | Performed by: NEUROMUSCULOSKELETAL MEDICINE & OMM

## 2025-07-16 PROCEDURE — 2022F DILAT RTA XM EVC RTNOPTHY: CPT | Performed by: NEUROMUSCULOSKELETAL MEDICINE & OMM

## 2025-07-16 PROCEDURE — 36415 COLL VENOUS BLD VENIPUNCTURE: CPT

## 2025-07-16 PROCEDURE — G8427 DOCREV CUR MEDS BY ELIG CLIN: HCPCS | Performed by: NEUROMUSCULOSKELETAL MEDICINE & OMM

## 2025-07-16 PROCEDURE — 80053 COMPREHEN METABOLIC PANEL: CPT

## 2025-07-16 PROCEDURE — G8419 CALC BMI OUT NRM PARAM NOF/U: HCPCS | Performed by: NEUROMUSCULOSKELETAL MEDICINE & OMM

## 2025-07-16 PROCEDURE — G0103 PSA SCREENING: HCPCS

## 2025-07-16 PROCEDURE — 99204 OFFICE O/P NEW MOD 45 MIN: CPT | Performed by: NEUROMUSCULOSKELETAL MEDICINE & OMM

## 2025-07-16 PROCEDURE — 3051F HG A1C>EQUAL 7.0%<8.0%: CPT | Performed by: NEUROMUSCULOSKELETAL MEDICINE & OMM

## 2025-07-16 PROCEDURE — 82306 VITAMIN D 25 HYDROXY: CPT

## 2025-07-16 PROCEDURE — 1159F MED LIST DOCD IN RCRD: CPT | Performed by: NEUROMUSCULOSKELETAL MEDICINE & OMM

## 2025-07-16 PROCEDURE — 83036 HEMOGLOBIN GLYCOSYLATED A1C: CPT | Performed by: NEUROMUSCULOSKELETAL MEDICINE & OMM

## 2025-07-16 PROCEDURE — 1160F RVW MEDS BY RX/DR IN RCRD: CPT | Performed by: NEUROMUSCULOSKELETAL MEDICINE & OMM

## 2025-07-16 RX ORDER — HYDROCHLOROTHIAZIDE 12.5 MG/1
CAPSULE ORAL
COMMUNITY
Start: 2025-07-03

## 2025-07-16 RX ORDER — BLOOD SUGAR DIAGNOSTIC
STRIP MISCELLANEOUS
COMMUNITY
Start: 2025-07-03

## 2025-07-16 RX ORDER — AZELASTINE 1 MG/ML
1 SPRAY, METERED NASAL 2 TIMES DAILY
Qty: 30 ML | Refills: 0 | Status: SHIPPED | OUTPATIENT
Start: 2025-07-16

## 2025-07-16 RX ORDER — LANCETS 33 GAUGE
1 EACH MISCELLANEOUS 4 TIMES DAILY
Qty: 1 EACH | Refills: 5 | Status: SHIPPED | OUTPATIENT
Start: 2025-07-16

## 2025-07-16 RX ORDER — IBUPROFEN 200 MG
1 TABLET ORAL 4 TIMES DAILY
Qty: 100 EACH | Refills: 3 | Status: SHIPPED | OUTPATIENT
Start: 2025-07-16 | End: 2025-07-16 | Stop reason: CLARIF

## 2025-07-16 RX ORDER — LANCETS 33 GAUGE
EACH MISCELLANEOUS
COMMUNITY
Start: 2025-04-30 | End: 2025-07-16 | Stop reason: SDUPTHER

## 2025-07-16 RX ORDER — LORATADINE 10 MG/1
TABLET ORAL
COMMUNITY
Start: 2025-07-03

## 2025-07-16 RX ORDER — ALCOHOL ANTISEPTIC PADS
PADS, MEDICATED (EA) TOPICAL
COMMUNITY
Start: 2025-07-10

## 2025-07-16 SDOH — HEALTH STABILITY: PHYSICAL HEALTH: ON AVERAGE, HOW MANY DAYS PER WEEK DO YOU ENGAGE IN MODERATE TO STRENUOUS EXERCISE (LIKE A BRISK WALK)?: 7 DAYS

## 2025-07-16 SDOH — SOCIAL STABILITY: SOCIAL NETWORK
DO YOU BELONG TO ANY CLUBS OR ORGANIZATIONS SUCH AS CHURCH GROUPS, UNIONS, FRATERNAL OR ATHLETIC GROUPS, OR SCHOOL GROUPS?: YES

## 2025-07-16 SDOH — SOCIAL STABILITY: SOCIAL INSECURITY
WITHIN THE LAST YEAR, HAVE YOU BEEN RAPED OR FORCED TO HAVE ANY KIND OF SEXUAL ACTIVITY BY YOUR PARTNER OR EX-PARTNER?: NO

## 2025-07-16 SDOH — HEALTH STABILITY: MENTAL HEALTH: HOW OFTEN DO YOU HAVE A DRINK CONTAINING ALCOHOL?: NEVER

## 2025-07-16 SDOH — SOCIAL STABILITY: SOCIAL NETWORK: HOW OFTEN DO YOU ATTEND MEETINGS OF THE CLUBS OR ORGANIZATIONS YOU BELONG TO?: 1 TO 4 TIMES PER YEAR

## 2025-07-16 SDOH — ECONOMIC STABILITY: FOOD INSECURITY: HOW HARD IS IT FOR YOU TO PAY FOR THE VERY BASICS LIKE FOOD, HOUSING, MEDICAL CARE, AND HEATING?: NOT HARD AT ALL

## 2025-07-16 SDOH — HEALTH STABILITY: MENTAL HEALTH: HOW MANY DRINKS CONTAINING ALCOHOL DO YOU HAVE ON A TYPICAL DAY WHEN YOU ARE DRINKING?: PATIENT DOES NOT DRINK

## 2025-07-16 SDOH — SOCIAL STABILITY: SOCIAL NETWORK: HOW OFTEN DO YOU GET TOGETHER WITH FRIENDS OR RELATIVES?: TWICE A WEEK

## 2025-07-16 SDOH — HEALTH STABILITY: MENTAL HEALTH
DO YOU FEEL STRESS - TENSE, RESTLESS, NERVOUS, OR ANXIOUS, OR UNABLE TO SLEEP AT NIGHT BECAUSE YOUR MIND IS TROUBLED ALL THE TIME - THESE DAYS?: TO SOME EXTENT

## 2025-07-16 SDOH — SOCIAL STABILITY: SOCIAL INSECURITY: WITHIN THE LAST YEAR, HAVE YOU BEEN AFRAID OF YOUR PARTNER OR EX-PARTNER?: NO

## 2025-07-16 SDOH — HEALTH STABILITY: PHYSICAL HEALTH: ON AVERAGE, HOW MANY MINUTES DO YOU ENGAGE IN EXERCISE AT THIS LEVEL?: 60 MIN

## 2025-07-16 SDOH — SOCIAL STABILITY: SOCIAL INSECURITY: WITHIN THE LAST YEAR, HAVE YOU BEEN HUMILIATED OR EMOTIONALLY ABUSED IN OTHER WAYS BY YOUR PARTNER OR EX-PARTNER?: NO

## 2025-07-16 SDOH — ECONOMIC STABILITY: FOOD INSECURITY: WITHIN THE PAST 12 MONTHS, YOU WORRIED THAT YOUR FOOD WOULD RUN OUT BEFORE YOU GOT THE MONEY TO BUY MORE.: NEVER TRUE

## 2025-07-16 SDOH — SOCIAL STABILITY: SOCIAL INSECURITY: ARE YOU MARRIED, WIDOWED, DIVORCED, SEPARATED, NEVER MARRIED, OR LIVING WITH A PARTNER?: DIVORCED

## 2025-07-16 SDOH — ECONOMIC STABILITY: HOUSING INSECURITY: IN THE LAST 12 MONTHS, WAS THERE A TIME WHEN YOU WERE NOT ABLE TO PAY THE MORTGAGE OR RENT ON TIME?: NO

## 2025-07-16 SDOH — SOCIAL STABILITY: SOCIAL INSECURITY
WITHIN THE LAST YEAR, HAVE YOU BEEN KICKED, HIT, SLAPPED, OR OTHERWISE PHYSICALLY HURT BY YOUR PARTNER OR EX-PARTNER?: NO

## 2025-07-16 SDOH — SOCIAL STABILITY: SOCIAL NETWORK: HOW OFTEN DO YOU ATTEND CHURCH OR RELIGIOUS SERVICES?: MORE THAN 4 TIMES PER YEAR

## 2025-07-16 SDOH — SOCIAL STABILITY: SOCIAL NETWORK: IN A TYPICAL WEEK, HOW MANY TIMES DO YOU TALK ON THE PHONE WITH FAMILY, FRIENDS, OR NEIGHBORS?: TWICE A WEEK

## 2025-07-16 SDOH — ECONOMIC STABILITY: FOOD INSECURITY: WITHIN THE PAST 12 MONTHS, THE FOOD YOU BOUGHT JUST DIDN'T LAST AND YOU DIDN'T HAVE MONEY TO GET MORE.: NEVER TRUE

## 2025-07-16 SDOH — ECONOMIC STABILITY: TRANSPORTATION INSECURITY: IN THE PAST 12 MONTHS, HAS LACK OF TRANSPORTATION KEPT YOU FROM MEDICAL APPOINTMENTS OR FROM GETTING MEDICATIONS?: NO

## 2025-07-16 ASSESSMENT — ENCOUNTER SYMPTOMS
VOMITING: 0
ABDOMINAL PAIN: 0
COUGH: 0
CHOKING: 0
WHEEZING: 0
CONSTIPATION: 0
CHEST TIGHTNESS: 0
BACK PAIN: 0
NAUSEA: 0
DIARRHEA: 0
SHORTNESS OF BREATH: 0

## 2025-07-16 ASSESSMENT — PATIENT HEALTH QUESTIONNAIRE - PHQ9
SUM OF ALL RESPONSES TO PHQ QUESTIONS 1-9: 0
2. FEELING DOWN, DEPRESSED OR HOPELESS: NOT AT ALL
SUM OF ALL RESPONSES TO PHQ QUESTIONS 1-9: 0
1. LITTLE INTEREST OR PLEASURE IN DOING THINGS: NOT AT ALL
SUM OF ALL RESPONSES TO PHQ QUESTIONS 1-9: 0
SUM OF ALL RESPONSES TO PHQ QUESTIONS 1-9: 0

## 2025-07-16 ASSESSMENT — ACTIVITIES OF DAILY LIVING (ADL): LACK_OF_TRANSPORTATION: NO

## 2025-07-16 NOTE — PROGRESS NOTES
Santosh Thomas (:  1950) is a 74 y.o. male, New patient, here for evaluation of the following chief complaint(s):  Establish Care         Assessment & Plan  1. Acute kidney failure.  - Currently undergoing dialysis 3 days a week.  - Physical exam: no clubbing, cyanosis, or edema noted bilaterally.  - Discussed the importance of continuing dialysis and monitoring kidney function.  - Referral to Dr. Boone, a cardiologist, for further evaluation and management.    2. Acute heart failure.  - Previously on Entresto, but medication was discontinued.  - Physical exam: heart regular rate and rhythm without murmurs, rubs, or gallops.  - Discussed the need for cardiology follow-up to reassess heart failure management.  - Referral to Dr. Boone, a cardiologist, for further evaluation and management.    3. Type 2 diabetes mellitus.  - A1c level currently at 7.3.  - Physical exam: no focal deficits, neurologically intact.  - Discussed the importance of blood sugar control and potential endocrinology follow-up.  - Referral to Milena Mercado, a nurse practitioner with Dr. Cuevas, an endocrinologist at [facility], for further evaluation and management.    4. Anxiety.  - Currently on Zoloft for anxiety.  - Physical exam: no significant findings related to anxiety.  - Discussed the potential side effects and benefits of Zoloft.  - Continued Zoloft as prescribed.    5. Medication management.  - Requested a refill for diazepam 5 mg, previously taken twice daily.  - Physical exam: no significant findings related to medication management.  - Discussed the habit-forming nature of diazepam and declined the refill request.  - Advised to avoid habit-forming medications.    6. Nasal congestion.  - Complained of nasal congestion requiring nasal spray.  - Physical exam: no significant findings related to nasal congestion.  - Discussed the use of nasal spray for symptom relief.  - Prescription for Astelin nasal spray sent to

## 2025-07-21 ENCOUNTER — TELEPHONE (OUTPATIENT)
Dept: FAMILY MEDICINE CLINIC | Age: 75
End: 2025-07-21

## 2025-07-30 ENCOUNTER — TELEPHONE (OUTPATIENT)
Dept: FAMILY MEDICINE CLINIC | Age: 75
End: 2025-07-30

## 2025-08-12 DIAGNOSIS — N18.6 TYPE 2 DIABETES MELLITUS WITH CHRONIC KIDNEY DISEASE ON CHRONIC DIALYSIS, WITH LONG-TERM CURRENT USE OF INSULIN (HCC): ICD-10-CM

## 2025-08-12 DIAGNOSIS — R09.82 POST-NASAL DRAINAGE: ICD-10-CM

## 2025-08-12 DIAGNOSIS — E11.22 TYPE 2 DIABETES MELLITUS WITH CHRONIC KIDNEY DISEASE ON CHRONIC DIALYSIS, WITH LONG-TERM CURRENT USE OF INSULIN (HCC): ICD-10-CM

## 2025-08-12 DIAGNOSIS — Z79.4 TYPE 2 DIABETES MELLITUS WITH CHRONIC KIDNEY DISEASE ON CHRONIC DIALYSIS, WITH LONG-TERM CURRENT USE OF INSULIN (HCC): ICD-10-CM

## 2025-08-12 DIAGNOSIS — Z99.2 TYPE 2 DIABETES MELLITUS WITH CHRONIC KIDNEY DISEASE ON CHRONIC DIALYSIS, WITH LONG-TERM CURRENT USE OF INSULIN (HCC): ICD-10-CM

## 2025-08-12 RX ORDER — LANOLIN ALCOHOL/MO/W.PET/CERES
400 CREAM (GRAM) TOPICAL 2 TIMES DAILY
Qty: 60 TABLET | Refills: 3 | Status: SHIPPED | OUTPATIENT
Start: 2025-08-12 | End: 2025-08-13 | Stop reason: SDUPTHER

## 2025-08-12 RX ORDER — ALCOHOL ANTISEPTIC PADS
1 PADS, MEDICATED (EA) TOPICAL 3 TIMES DAILY
Qty: 200 EACH | Refills: 5 | Status: SHIPPED | OUTPATIENT
Start: 2025-08-12 | End: 2025-08-13 | Stop reason: SDUPTHER

## 2025-08-12 RX ORDER — BLOOD-GLUCOSE METER
1 KIT MISCELLANEOUS DAILY
Qty: 1 KIT | Refills: 0 | Status: SHIPPED | OUTPATIENT
Start: 2025-08-12

## 2025-08-12 RX ORDER — HYDROCHLOROTHIAZIDE 12.5 MG/1
1 CAPSULE ORAL
Qty: 3 EACH | Refills: 5 | Status: SHIPPED | OUTPATIENT
Start: 2025-08-12 | End: 2025-08-13 | Stop reason: SDUPTHER

## 2025-08-12 RX ORDER — ALLOPURINOL 100 MG/1
100 TABLET ORAL DAILY
Qty: 30 TABLET | Refills: 3 | Status: SHIPPED | OUTPATIENT
Start: 2025-08-12 | End: 2025-08-13 | Stop reason: SDUPTHER

## 2025-08-12 RX ORDER — LORATADINE 10 MG/1
10 TABLET ORAL DAILY
Qty: 30 TABLET | Refills: 5 | Status: SHIPPED | OUTPATIENT
Start: 2025-08-12 | End: 2025-08-13 | Stop reason: SDUPTHER

## 2025-08-12 RX ORDER — ROSUVASTATIN CALCIUM 40 MG/1
40 TABLET, COATED ORAL NIGHTLY
Qty: 30 TABLET | Refills: 5 | Status: SHIPPED | OUTPATIENT
Start: 2025-08-12 | End: 2025-08-13 | Stop reason: SDUPTHER

## 2025-08-12 RX ORDER — FENOFIBRATE 145 MG/1
145 TABLET, FILM COATED ORAL EVERY MORNING
Qty: 30 TABLET | Refills: 5 | Status: SHIPPED | OUTPATIENT
Start: 2025-08-12 | End: 2025-08-13 | Stop reason: SDUPTHER

## 2025-08-12 RX ORDER — HYDRALAZINE HYDROCHLORIDE 50 MG/1
50 TABLET, FILM COATED ORAL EVERY 8 HOURS SCHEDULED
Qty: 90 TABLET | Refills: 5 | Status: SHIPPED | OUTPATIENT
Start: 2025-08-12

## 2025-08-12 RX ORDER — BLOOD SUGAR DIAGNOSTIC
1 STRIP MISCELLANEOUS 3 TIMES DAILY
Qty: 100 EACH | Refills: 5 | Status: SHIPPED | OUTPATIENT
Start: 2025-08-12 | End: 2025-08-13 | Stop reason: SDUPTHER

## 2025-08-12 RX ORDER — INSULIN LISPRO 100 [IU]/ML
3 INJECTION, SOLUTION INTRAVENOUS; SUBCUTANEOUS
Qty: 2 ADJUSTABLE DOSE PRE-FILLED PEN SYRINGE | Refills: 5 | Status: SHIPPED | OUTPATIENT
Start: 2025-08-12 | End: 2025-08-13 | Stop reason: SDUPTHER

## 2025-08-12 RX ORDER — HYDROXYZINE HYDROCHLORIDE 25 MG/1
25 TABLET, FILM COATED ORAL NIGHTLY
Qty: 30 TABLET | Refills: 0 | Status: SHIPPED | OUTPATIENT
Start: 2025-08-12 | End: 2025-08-13 | Stop reason: SDUPTHER

## 2025-08-12 RX ORDER — METOPROLOL SUCCINATE 25 MG/1
25 TABLET, EXTENDED RELEASE ORAL 2 TIMES DAILY
Qty: 30 TABLET | Refills: 3 | Status: SHIPPED | OUTPATIENT
Start: 2025-08-12 | End: 2025-08-13 | Stop reason: SDUPTHER

## 2025-08-13 DIAGNOSIS — E11.22 TYPE 2 DIABETES MELLITUS WITH CHRONIC KIDNEY DISEASE ON CHRONIC DIALYSIS, WITH LONG-TERM CURRENT USE OF INSULIN (HCC): ICD-10-CM

## 2025-08-13 DIAGNOSIS — Z79.4 TYPE 2 DIABETES MELLITUS WITH CHRONIC KIDNEY DISEASE ON CHRONIC DIALYSIS, WITH LONG-TERM CURRENT USE OF INSULIN (HCC): ICD-10-CM

## 2025-08-13 DIAGNOSIS — Z99.2 TYPE 2 DIABETES MELLITUS WITH CHRONIC KIDNEY DISEASE ON CHRONIC DIALYSIS, WITH LONG-TERM CURRENT USE OF INSULIN (HCC): ICD-10-CM

## 2025-08-13 DIAGNOSIS — N18.6 TYPE 2 DIABETES MELLITUS WITH CHRONIC KIDNEY DISEASE ON CHRONIC DIALYSIS, WITH LONG-TERM CURRENT USE OF INSULIN (HCC): ICD-10-CM

## 2025-08-13 DIAGNOSIS — R09.82 POST-NASAL DRAINAGE: ICD-10-CM

## 2025-08-13 RX ORDER — INSULIN LISPRO 100 [IU]/ML
3 INJECTION, SOLUTION INTRAVENOUS; SUBCUTANEOUS
Qty: 2 ADJUSTABLE DOSE PRE-FILLED PEN SYRINGE | Refills: 5 | Status: SHIPPED | OUTPATIENT
Start: 2025-08-13

## 2025-08-13 RX ORDER — LORATADINE 10 MG/1
10 TABLET ORAL DAILY
Qty: 30 TABLET | Refills: 5 | Status: SHIPPED | OUTPATIENT
Start: 2025-08-13

## 2025-08-13 RX ORDER — ALCOHOL ANTISEPTIC PADS
1 PADS, MEDICATED (EA) TOPICAL 3 TIMES DAILY
Qty: 200 EACH | Refills: 5 | Status: SHIPPED | OUTPATIENT
Start: 2025-08-13

## 2025-08-13 RX ORDER — METOPROLOL SUCCINATE 25 MG/1
25 TABLET, EXTENDED RELEASE ORAL 2 TIMES DAILY
Qty: 30 TABLET | Refills: 3 | Status: SHIPPED | OUTPATIENT
Start: 2025-08-13

## 2025-08-13 RX ORDER — HYDROXYZINE HYDROCHLORIDE 25 MG/1
25 TABLET, FILM COATED ORAL NIGHTLY
Qty: 30 TABLET | Refills: 0 | Status: SHIPPED | OUTPATIENT
Start: 2025-08-13

## 2025-08-13 RX ORDER — ROSUVASTATIN CALCIUM 40 MG/1
40 TABLET, COATED ORAL NIGHTLY
Qty: 30 TABLET | Refills: 5 | Status: SHIPPED | OUTPATIENT
Start: 2025-08-13

## 2025-08-13 RX ORDER — HYDROCHLOROTHIAZIDE 12.5 MG/1
1 CAPSULE ORAL
Qty: 3 EACH | Refills: 5 | Status: SHIPPED | OUTPATIENT
Start: 2025-08-13

## 2025-08-13 RX ORDER — LANOLIN ALCOHOL/MO/W.PET/CERES
400 CREAM (GRAM) TOPICAL 2 TIMES DAILY
Qty: 60 TABLET | Refills: 3 | Status: SHIPPED | OUTPATIENT
Start: 2025-08-13

## 2025-08-13 RX ORDER — ALLOPURINOL 100 MG/1
100 TABLET ORAL DAILY
Qty: 30 TABLET | Refills: 3 | Status: SHIPPED | OUTPATIENT
Start: 2025-08-13

## 2025-08-13 RX ORDER — BLOOD SUGAR DIAGNOSTIC
1 STRIP MISCELLANEOUS 3 TIMES DAILY
Qty: 100 EACH | Refills: 5 | Status: SHIPPED | OUTPATIENT
Start: 2025-08-13

## 2025-08-13 RX ORDER — FENOFIBRATE 145 MG/1
145 TABLET, FILM COATED ORAL EVERY MORNING
Qty: 30 TABLET | Refills: 5 | Status: SHIPPED | OUTPATIENT
Start: 2025-08-13

## (undated) DEVICE — INTENDED FOR TISSUE SEPARATION, AND OTHER PROCEDURES THAT REQUIRE A SHARP SURGICAL BLADE TO PUNCTURE OR CUT.: Brand: BARD-PARKER ® STAINLESS STEEL BLADES

## (undated) DEVICE — LOOP VES W13MM THK09MM MINI RED SIL FLD REPELLENT

## (undated) DEVICE — SYRINGE MED 10ML LUERLOCK TIP W/O SFTY DISP

## (undated) DEVICE — DOUBLE BASIN SET: Brand: MEDLINE INDUSTRIES, INC.

## (undated) DEVICE — PACK PROCEDURE SURG GEN CUST

## (undated) DEVICE — STANDARD HYPODERMIC NEEDLE,ALUMINUM HUB: Brand: MONOJECT

## (undated) DEVICE — AV FISTULA: Brand: MEDLINE INDUSTRIES, INC.

## (undated) DEVICE — BNDG,ELSTC,MATRIX,STRL,4"X5YD,LF,HOOK&LP: Brand: MEDLINE

## (undated) DEVICE — SOLUTION IRRIG 2000ML 0.9% SOD CHL USP UROMATIC PLAS CONT

## (undated) DEVICE — GOWN,SIRUS,FABRNF,XL,20/CS: Brand: MEDLINE

## (undated) DEVICE — GOWN,SIRUS,FABRNF,L,20/CS: Brand: MEDLINE

## (undated) DEVICE — SYRINGE, LUER LOCK, 5ML: Brand: MEDLINE

## (undated) DEVICE — SYRINGE MED 20ML STD CLR PLAS LUERLOCK TIP N CTRL DISP

## (undated) DEVICE — CHLORAPREP 26ML ORANGE

## (undated) DEVICE — COVER HNDL LT DISP

## (undated) DEVICE — GLOVE SURG SZ 7 L12IN FNGR THK94MIL TRNSLUC YEL LTX HYDRGEL

## (undated) DEVICE — DRESSING PETRO W3XL8IN OIL EMUL N ADH GZ KNIT IMPREG CELOS

## (undated) DEVICE — ELECTRODE PT RET AD L9FT HI MOIST COND ADH HYDRGEL CORDED

## (undated) DEVICE — CURETTE ORTHO

## (undated) DEVICE — TOWEL,OR,DSP,ST,BLUE,STD,6/PK,12PK/CS: Brand: MEDLINE

## (undated) DEVICE — SYRINGE MED 10ML SLIP TIP BLNT FILL AND LUERLOCK DISP

## (undated) DEVICE — SYRINGE MED 5ML STD CLR PLAS LUERLOCK TIP N CTRL DISP

## (undated) DEVICE — INSTRUMENT SKIN HOOKS REUSABLE

## (undated) DEVICE — BANDAGE,GAUZE,CONFORMING,3"X75",STRL,LF: Brand: MEDLINE

## (undated) DEVICE — SYSTEM CATH 20GA L1IN OD1.0668-1.143MM ID0.7874-0.8636MM 383516

## (undated) DEVICE — SHOE POSTOP M MAN 9-11 UNIV FOAM TRICOT SEMI FLX SKID

## (undated) DEVICE — SOLUTION IRRIG 500ML 0.9% SOD CHLO USP POUR PLAS BTL

## (undated) DEVICE — SET PSI

## (undated) DEVICE — NEEDLE JAMSH BNE MAR 13G X 2

## (undated) DEVICE — SLING ARM XL L20IN D75IN WHT POLY MESH ENVELOP MTL SIDE

## (undated) DEVICE — SOLUTION IV IRRIG POUR BRL 0.9% SODIUM CHL 2F7124

## (undated) DEVICE — TUBING SUCT 12FR MAL ALUM SHFT FN CAP VENT UNIV CONN W/ OBT

## (undated) DEVICE — GAUZE,SPONGE,4"X4",8PLY,STRL,LF,10/TRAY: Brand: MEDLINE

## (undated) DEVICE — STOCKINETTE,SINGLE PLY,4X48,STERILE: Brand: MEDLINE

## (undated) DEVICE — VELCLOSE LATEX FREE VELCRO ELASTIC BANDAGE 3INX5YD: Brand: VELCLOSE

## (undated) DEVICE — 4-PORT MANIFOLD: Brand: NEPTUNE 2

## (undated) DEVICE — STOCKINETTE COMPR W3XL900IN OFF WHT UNBLEACHED FAB HLLW RIB

## (undated) DEVICE — HANDPIECE SET WITH COAXIAL HIGH FLOW TIP AND SUCTION TUBE: Brand: INTERPULSE

## (undated) DEVICE — DRAPE,EXTREMITY,89X128,STERILE: Brand: MEDLINE

## (undated) DEVICE — 6 X 9  1.75MIL 4-WALL LABGUARD: Brand: MINIGRIP COMMERCIAL LLC

## (undated) DEVICE — PADDING,UNDERCAST,COTTON, 4"X4YD STERILE: Brand: MEDLINE